# Patient Record
Sex: MALE | Race: WHITE | NOT HISPANIC OR LATINO | URBAN - METROPOLITAN AREA
[De-identification: names, ages, dates, MRNs, and addresses within clinical notes are randomized per-mention and may not be internally consistent; named-entity substitution may affect disease eponyms.]

---

## 2017-01-25 PROBLEM — D50.9 IRON DEFICIENCY ANEMIA: Status: ACTIVE | Noted: 2017-01-25

## 2017-01-25 PROBLEM — G63 POLYNEUROPATHY ASSOCIATED WITH UNDERLYING DISEASE (HCC): Chronic | Status: ACTIVE | Noted: 2017-01-25

## 2017-04-18 ENCOUNTER — APPOINTMENT (RX ONLY)
Dept: URBAN - METROPOLITAN AREA CLINIC 349 | Facility: CLINIC | Age: 67
Setting detail: DERMATOLOGY
End: 2017-04-18

## 2017-04-18 DIAGNOSIS — L72.8 OTHER FOLLICULAR CYSTS OF THE SKIN AND SUBCUTANEOUS TISSUE: ICD-10-CM

## 2017-04-18 PROBLEM — C44.629 SQUAMOUS CELL CARCINOMA OF SKIN OF LEFT UPPER LIMB, INCLUDING SHOULDER: Status: ACTIVE | Noted: 2017-04-18

## 2017-04-18 PROCEDURE — 88305 TISSUE EXAM BY PATHOLOGIST: CPT

## 2017-04-18 PROCEDURE — 17262 DSTRJ MAL LES T/A/L 1.1-2.0: CPT

## 2017-04-18 PROCEDURE — ? OTHER

## 2017-04-18 PROCEDURE — A4550 SURGICAL TRAYS: HCPCS

## 2017-04-18 PROCEDURE — ? SHAVE REMOVAL AND DESTRUCTION

## 2017-04-18 PROCEDURE — ? PATHOLOGY BILLING

## 2017-04-18 PROCEDURE — ? COUNSELING

## 2017-04-18 PROCEDURE — 99202 OFFICE O/P NEW SF 15 MIN: CPT | Mod: 25

## 2017-04-18 PROCEDURE — ? DEFER

## 2017-04-18 ASSESSMENT — LOCATION DETAILED DESCRIPTION DERM
LOCATION DETAILED: LEFT MEDIAL UPPER BACK
LOCATION DETAILED: LEFT MEDIAL UPPER BACK

## 2017-04-18 ASSESSMENT — LOCATION SIMPLE DESCRIPTION DERM
LOCATION SIMPLE: LEFT UPPER BACK
LOCATION SIMPLE: LEFT UPPER BACK

## 2017-04-18 ASSESSMENT — LOCATION ZONE DERM
LOCATION ZONE: TRUNK
LOCATION ZONE: TRUNK

## 2017-04-18 NOTE — PROCEDURE: SHAVE REMOVAL AND DESTRUCTION
Post-Care Instructions: I reviewed with the patient in detail post-care instructions. Patient is to keep the biopsy site dry overnight, and then apply bacitracin twice daily until healed. Patient may apply hydrogen peroxide soaks to remove any crusting.  After the procedure, the patient was observed for 5-10 minutes and was oriented to,person, place and time and denied feeling dizzy, queasy and stated that they were not going to faint
Bill For Surgical Tray: yes
Accession #: [email or web address]
Anesthesia Volume In Cc: 1
Anesthesia Type: 2% lidocaine with epinephrine
Size Of Lesion In Cm: 1.1
Bill As?: Note: Bill Malignant Destruction If Path Confirms Malignant Lesion. Only Bill As Shave Removal If Path Comes Back Benign. Do Not Bill Shave Removal On Malignant Lesions.: Malignant Destruction
Dressing: dry sterile dressing
Bill 58583 For Specimen Handling/Conveyance To Laboratory?: no
Consent: Written consent was obtained and risks were reviewed including but not limited to scarring, infection, bleeding, scabbing, incomplete removal, nerve damage and allergy to anesthesia.
Wound Care: Vaseline
Detail Level: Detailed
Notification Instructions: Patient will be notified of biopsy results. However, patient instructed to call the office if not contacted within 2 weeks.
Billing Type: Third-Party Bill
Hemostasis: Drysol
Cautery Type: electrodesiccation

## 2017-04-18 NOTE — PROCEDURE: OTHER
Other (Free Text): After the procedure, the patient was observed 5-10 minutes and was oriented to person, place and time and denied feeling dizzy, queasy and stated that they  did not feel that they were going to faint
Note Text (......Xxx Chief Complaint.): This diagnosis correlates with the
Detail Level: Detailed

## 2017-07-26 ENCOUNTER — APPOINTMENT (RX ONLY)
Dept: URBAN - METROPOLITAN AREA CLINIC 349 | Facility: CLINIC | Age: 67
Setting detail: DERMATOLOGY
End: 2017-07-26

## 2017-07-26 DIAGNOSIS — L57.0 ACTINIC KERATOSIS: ICD-10-CM

## 2017-07-26 DIAGNOSIS — D22 MELANOCYTIC NEVI: ICD-10-CM

## 2017-07-26 PROBLEM — D22.5 MELANOCYTIC NEVI OF TRUNK: Status: ACTIVE | Noted: 2017-07-26

## 2017-07-26 PROBLEM — C44.629 SQUAMOUS CELL CARCINOMA OF SKIN OF LEFT UPPER LIMB, INCLUDING SHOULDER: Status: ACTIVE | Noted: 2017-07-26

## 2017-07-26 PROCEDURE — ? OTHER

## 2017-07-26 PROCEDURE — ? COUNSELING

## 2017-07-26 PROCEDURE — ? SHAVE REMOVAL AND DESTRUCTION

## 2017-07-26 PROCEDURE — 17000 DESTRUCT PREMALG LESION: CPT

## 2017-07-26 PROCEDURE — ? LIQUID NITROGEN

## 2017-07-26 PROCEDURE — A4550 SURGICAL TRAYS: HCPCS

## 2017-07-26 PROCEDURE — 17262 DSTRJ MAL LES T/A/L 1.1-2.0: CPT | Mod: 59

## 2017-07-26 PROCEDURE — ? PATHOLOGY BILLING

## 2017-07-26 PROCEDURE — 88305 TISSUE EXAM BY PATHOLOGIST: CPT

## 2017-07-26 ASSESSMENT — LOCATION SIMPLE DESCRIPTION DERM
LOCATION SIMPLE: LEFT FOREARM
LOCATION SIMPLE: RIGHT UPPER BACK

## 2017-07-26 ASSESSMENT — LOCATION DETAILED DESCRIPTION DERM
LOCATION DETAILED: LEFT DISTAL DORSAL FOREARM
LOCATION DETAILED: RIGHT SUPERIOR MEDIAL UPPER BACK

## 2017-07-26 ASSESSMENT — LOCATION ZONE DERM
LOCATION ZONE: ARM
LOCATION ZONE: TRUNK

## 2017-07-26 NOTE — PROCEDURE: SHAVE REMOVAL AND DESTRUCTION
Bill 46927 For Specimen Handling/Conveyance To Laboratory?: no
Number Of Curettages: 1
Post-Care Instructions: I reviewed with the patient in detail post-care instructions. Patient is to keep the biopsy site dry overnight, and then apply bacitracin twice daily until healed. Patient may apply hydrogen peroxide soaks to remove any crusting.  After the procedure, the patient was observed for 5-10 minutes and was oriented to,person, place and time and denied feeling dizzy, queasy and stated that they were not going to faint
Size Of Lesion In Cm: 1.1
Anesthesia Type: 2% lidocaine with epinephrine
Bill As?: Note: Bill Malignant Destruction If Path Confirms Malignant Lesion. Only Bill As Shave Removal If Path Comes Back Benign. Do Not Bill Shave Removal On Malignant Lesions.: Malignant Destruction
Billing Type: Third-Party Bill
Hemostasis: Drysol
Cautery Type: electrodesiccation
Wound Care: Vaseline
Accession #: [email or web address]
Notification Instructions: Patient will be notified of biopsy results. However, patient instructed to call the office if not contacted within 2 weeks.
Render Post-Care Instructions In Note?: yes
Detail Level: Detailed
Consent: Written consent was obtained and risks were reviewed including but not limited to scarring, infection, bleeding, scabbing, incomplete removal, nerve damage and allergy to anesthesia.
Dressing: dry sterile dressing

## 2017-07-26 NOTE — PROCEDURE: LIQUID NITROGEN
Number Of Freeze-Thaw Cycles: 2 freeze-thaw cycles
Consent: The patient's consent was obtained including but not limited to risks of crusting, scabbing, blistering, scarring, darker or lighter pigmentary change, recurrence, incomplete removal and infection.
Post-Care Instructions: I reviewed with the patient in detail post-care instructions. Patient is to wear sunprotection, and avoid picking at any of the treated lesions. Pt may apply Vaseline to crusted or scabbing areas.
Duration Of Freeze Thaw-Cycle (Seconds): 3
Render Post-Care Instructions In Note?: no
Detail Level: Zone

## 2017-07-26 NOTE — PROCEDURE: OTHER
Note Text (......Xxx Chief Complaint.): This diagnosis correlates with the
Detail Level: Detailed
Other (Free Text): After the procedure, the patient was observed 5-10 minutes and was oriented to person, place and time and denied feeling dizzy, queasy and stated that they  did not feel that they were going to faint

## 2017-12-04 ENCOUNTER — HOSPITAL ENCOUNTER (EMERGENCY)
Age: 67
Discharge: HOME | End: 2017-12-05
Attending: EMERGENCY MEDICINE
Payer: MEDICARE

## 2017-12-04 ENCOUNTER — APPOINTMENT (OUTPATIENT)
Dept: GENERAL RADIOLOGY | Age: 67
End: 2017-12-04
Attending: EMERGENCY MEDICINE
Payer: MEDICARE

## 2017-12-04 DIAGNOSIS — R07.9 ACUTE CHEST PAIN: ICD-10-CM

## 2017-12-04 DIAGNOSIS — J06.9 ACUTE URI: Primary | ICD-10-CM

## 2017-12-04 LAB
ANION GAP SERPL CALC-SCNC: 12 MMOL/L (ref 7–16)
BASOPHILS # BLD: 0 K/UL (ref 0–0.2)
BASOPHILS NFR BLD: 0 % (ref 0–2)
BUN SERPL-MCNC: 27 MG/DL (ref 8–23)
CALCIUM SERPL-MCNC: 8.9 MG/DL (ref 8.3–10.4)
CHLORIDE SERPL-SCNC: 104 MMOL/L (ref 98–107)
CO2 SERPL-SCNC: 22 MMOL/L (ref 21–32)
CREAT SERPL-MCNC: 1.65 MG/DL (ref 0.8–1.5)
DIFFERENTIAL METHOD BLD: ABNORMAL
EOSINOPHIL # BLD: 0.4 K/UL (ref 0–0.8)
EOSINOPHIL NFR BLD: 3 % (ref 0.5–7.8)
ERYTHROCYTE [DISTWIDTH] IN BLOOD BY AUTOMATED COUNT: 15.7 % (ref 11.9–14.6)
GLUCOSE SERPL-MCNC: 181 MG/DL (ref 65–100)
HCT VFR BLD AUTO: 42.6 % (ref 41.1–50.3)
HGB BLD-MCNC: 14 G/DL (ref 13.6–17.2)
IMM GRANULOCYTES # BLD: 0 K/UL (ref 0–0.5)
IMM GRANULOCYTES NFR BLD AUTO: 0 % (ref 0–5)
LACTATE BLD-SCNC: 1.9 MMOL/L (ref 0.5–1.9)
LYMPHOCYTES # BLD: 2 K/UL (ref 0.5–4.6)
LYMPHOCYTES NFR BLD: 16 % (ref 13–44)
MCH RBC QN AUTO: 28.9 PG (ref 26.1–32.9)
MCHC RBC AUTO-ENTMCNC: 32.9 G/DL (ref 31.4–35)
MCV RBC AUTO: 88 FL (ref 79.6–97.8)
MONOCYTES # BLD: 1.1 K/UL (ref 0.1–1.3)
MONOCYTES NFR BLD: 9 % (ref 4–12)
NEUTS SEG # BLD: 8.9 K/UL (ref 1.7–8.2)
NEUTS SEG NFR BLD: 72 % (ref 43–78)
PLATELET # BLD AUTO: 109 K/UL (ref 150–450)
PLATELET COMMENTS,PCOM: SLIGHT
PMV BLD AUTO: 11.8 FL (ref 10.8–14.1)
POTASSIUM SERPL-SCNC: 5.5 MMOL/L (ref 3.5–5.1)
RBC # BLD AUTO: 4.84 M/UL (ref 4.23–5.67)
SODIUM SERPL-SCNC: 138 MMOL/L (ref 136–145)
TROPONIN I BLD-MCNC: 0 NG/ML (ref 0.02–0.05)
WBC # BLD AUTO: 12.4 K/UL (ref 4.3–11.1)
WBC MORPH BLD: ABNORMAL

## 2017-12-04 PROCEDURE — 80048 BASIC METABOLIC PNL TOTAL CA: CPT | Performed by: EMERGENCY MEDICINE

## 2017-12-04 PROCEDURE — 84484 ASSAY OF TROPONIN QUANT: CPT

## 2017-12-04 PROCEDURE — 71020 XR CHEST PA LAT: CPT

## 2017-12-04 PROCEDURE — 85025 COMPLETE CBC W/AUTO DIFF WBC: CPT

## 2017-12-04 PROCEDURE — 93005 ELECTROCARDIOGRAM TRACING: CPT | Performed by: EMERGENCY MEDICINE

## 2017-12-04 PROCEDURE — 99285 EMERGENCY DEPT VISIT HI MDM: CPT | Performed by: EMERGENCY MEDICINE

## 2017-12-04 PROCEDURE — 83605 ASSAY OF LACTIC ACID: CPT

## 2017-12-05 VITALS
HEIGHT: 75 IN | WEIGHT: 237 LBS | OXYGEN SATURATION: 98 % | RESPIRATION RATE: 18 BRPM | DIASTOLIC BLOOD PRESSURE: 50 MMHG | HEART RATE: 85 BPM | SYSTOLIC BLOOD PRESSURE: 99 MMHG | TEMPERATURE: 100 F | BODY MASS INDEX: 29.47 KG/M2

## 2017-12-05 LAB
ATRIAL RATE: 82 BPM
CALCULATED P AXIS, ECG09: 62 DEGREES
CALCULATED R AXIS, ECG10: 104 DEGREES
CALCULATED T AXIS, ECG11: 100 DEGREES
DIAGNOSIS, 93000: NORMAL
P-R INTERVAL, ECG05: 198 MS
Q-T INTERVAL, ECG07: 392 MS
QRS DURATION, ECG06: 90 MS
QTC CALCULATION (BEZET), ECG08: 457 MS
VENTRICULAR RATE, ECG03: 82 BPM

## 2017-12-05 RX ORDER — AZITHROMYCIN 250 MG/1
TABLET, FILM COATED ORAL
Qty: 6 TAB | Refills: 0 | Status: SHIPPED | OUTPATIENT
Start: 2017-12-05 | End: 2018-01-31 | Stop reason: ALTCHOICE

## 2017-12-05 RX ORDER — CODEINE PHOSPHATE AND GUAIFENESIN 10; 100 MG/5ML; MG/5ML
5-10 SOLUTION ORAL
Qty: 150 ML | Refills: 0 | Status: SHIPPED | OUTPATIENT
Start: 2017-12-05 | End: 2018-01-31

## 2017-12-05 NOTE — DISCHARGE INSTRUCTIONS
Upper Respiratory Infection (Cold): Care Instructions  Your Care Instructions    An upper respiratory infection, or URI, is an infection of the nose, sinuses, or throat. URIs are spread by coughs, sneezes, and direct contact. The common cold is the most frequent kind of URI. The flu and sinus infections are other kinds of URIs. Almost all URIs are caused by viruses. Antibiotics won't cure them. But you can treat most infections with home care. This may include drinking lots of fluids and taking over-the-counter pain medicine. You will probably feel better in 4 to 10 days. The doctor has checked you carefully, but problems can develop later. If you notice any problems or new symptoms, get medical treatment right away. Follow-up care is a key part of your treatment and safety. Be sure to make and go to all appointments, and call your doctor if you are having problems. It's also a good idea to know your test results and keep a list of the medicines you take. How can you care for yourself at home? · To prevent dehydration, drink plenty of fluids, enough so that your urine is light yellow or clear like water. Choose water and other caffeine-free clear liquids until you feel better. If you have kidney, heart, or liver disease and have to limit fluids, talk with your doctor before you increase the amount of fluids you drink. · Take an over-the-counter pain medicine, such as acetaminophen (Tylenol), ibuprofen (Advil, Motrin), or naproxen (Aleve). Read and follow all instructions on the label. · Before you use cough and cold medicines, check the label. These medicines may not be safe for young children or for people with certain health problems. · Be careful when taking over-the-counter cold or flu medicines and Tylenol at the same time. Many of these medicines have acetaminophen, which is Tylenol. Read the labels to make sure that you are not taking more than the recommended dose.  Too much acetaminophen (Tylenol) can be harmful. · Get plenty of rest.  · Do not smoke or allow others to smoke around you. If you need help quitting, talk to your doctor about stop-smoking programs and medicines. These can increase your chances of quitting for good. When should you call for help? Call 911 anytime you think you may need emergency care. For example, call if:  ? · You have severe trouble breathing. ?Call your doctor now or seek immediate medical care if:  ? · You seem to be getting much sicker. ? · You have new or worse trouble breathing. ? · You have a new or higher fever. ? · You have a new rash. ? Watch closely for changes in your health, and be sure to contact your doctor if:  ? · You have a new symptom, such as a sore throat, an earache, or sinus pain. ? · You cough more deeply or more often, especially if you notice more mucus or a change in the color of your mucus. ? · You do not get better as expected. Where can you learn more? Go to http://ran-genaro.info/. Enter R992 in the search box to learn more about \"Upper Respiratory Infection (Cold): Care Instructions. \"  Current as of: May 12, 2017  Content Version: 11.4  © 4488-0858 [employer]. Care instructions adapted under license by "[employer] (which disclaims liability or warranty for this information). If you have questions about a medical condition or this instruction, always ask your healthcare professional. [place] [address] any warranty or liability for your use of this information. Musculoskeletal Chest Pain: Care Instructions  Your Care Instructions    Chest pain is not always a sign that something is wrong with your heart or that you have another serious problem. The doctor thinks your chest pain is caused by strained muscles or ligaments, inflamed chest cartilage, or another problem in your chest, rather than by your heart.  You may need more tests to find the cause of your chest pain. Follow-up care is a key part of your treatment and safety. Be sure to make and go to all appointments, and call your doctor if you are having problems. It's also a good idea to know your test results and keep a list of the medicines you take. How can you care for yourself at home? · Take pain medicines exactly as directed. ¨ If the doctor gave you a prescription medicine for pain, take it as prescribed. ¨ If you are not taking a prescription pain medicine, ask your doctor if you can take an over-the-counter medicine. · Rest and protect the sore area. · Stop, change, or take a break from any activity that may be causing your pain or soreness. · Put ice or a cold pack on the sore area for 10 to 20 minutes at a time. Try to do this every 1 to 2 hours for the next 3 days (when you are awake) or until the swelling goes down. Put a thin cloth between the ice and your skin. · After 2 or 3 days, apply a heating pad set on low or a warm cloth to the area that hurts. Some doctors suggest that you go back and forth between hot and cold. · Do not wrap or tape your ribs for support. This may cause you to take smaller breaths, which could increase your risk of lung problems. · Mentholated creams such as Bengay or Icy Hot may soothe sore muscles. Follow the instructions on the package. · Follow your doctor's instructions for exercising. · Gentle stretching and massage may help you get better faster. Stretch slowly to the point just before pain begins, and hold the stretch for at least 15 to 30 seconds. Do this 3 or 4 times a day. Stretch just after you have applied heat. · As your pain gets better, slowly return to your normal activities. Any increased pain may be a sign that you need to rest a while longer. When should you call for help? Call 911 anytime you think you may need emergency care. For example, call if:  ? · You have chest pain or pressure. This may occur with:  ¨ Sweating.   ¨ Shortness of breath. ¨ Nausea or vomiting. ¨ Pain that spreads from the chest to the neck, jaw, or one or both shoulders or arms. ¨ Dizziness or lightheadedness. ¨ A fast or uneven pulse. After calling 911, chew 1 adult-strength aspirin. Wait for an ambulance. Do not try to drive yourself. ? · You have sudden chest pain and shortness of breath, or you cough up blood. ?Call your doctor now or seek immediate medical care if:  ? · You have any trouble breathing. ? · Your chest pain gets worse. ? · Your chest pain occurs consistently with exercise and is relieved by rest.   ? Watch closely for changes in your health, and be sure to contact your doctor if:  ? · Your chest pain does not get better after 1 week. Where can you learn more? Go to http://ran-genaro.info/. Enter V293 in the search box to learn more about \"Musculoskeletal Chest Pain: Care Instructions. \"  Current as of: March 20, 2017  Content Version: 11.4  © 8535-1362 [employer]. Care instructions adapted under license by [employer] (which disclaims liability or warranty for this information). If you have questions about a medical condition or this instruction, always ask your healthcare professional. [place] [address] any warranty or liability for your use of this information.

## 2017-12-05 NOTE — ED TRIAGE NOTES
Pt states that he has been having a  Productive cough with yellow mucus  for 3 days and that tonight he started to have chest pain

## 2017-12-05 NOTE — ED PROVIDER NOTES
HPI Comments: Several people in the family with similar symptoms earlier in the week/last week. Patient is a 78 y.o. male presenting with cough. The history is provided by the patient and the spouse. Cough   This is a new problem. The current episode started more than 2 days ago. The problem occurs every few minutes. The problem has been gradually worsening. The cough is productive of sputum. Patient reports a subjective fever - was not measured. The fever has been present for 1 - 2 days. Associated symptoms include chest pain (with cough and deep inspiration), rhinorrhea and myalgias. Pertinent negatives include no chills, no sweats, no weight loss, no eye redness, no ear congestion, no ear pain, no headaches, no sore throat, no shortness of breath, no wheezing, no nausea, no vomiting and no confusion. He has tried nothing for the symptoms. The treatment provided no relief. He is a smoker. His past medical history is significant for bronchitis. His past medical history does not include pneumonia, asthma or CHF. Past Medical History:   Diagnosis Date    Aneurysm 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 Stent x 5 on 2008    Edema     Ischemic cardiomyopathy     [facility] (2007) EF 20%     [facility] (2008) EF 40%  echo (1/14/11): Mild LV dysfunction. EF 45%. Impaired relaxation. Mod. left atrial enlargement. Mild mitral/tricuspid/pulmonic regurgitation    Other diseases of lung, not elsewhere classified     CT of chest 8/2010:  5 mm right upper lobe pulmonary nodule. 1 mm nodule in left upper lobe. .  CT chest (5/7/12) Stable tiny right upper lobe pulmonary nodule suggesting benign etiology.          Past Surgical History:   Procedure Laterality Date    HX CORONARY STENT PLACEMENT      stents x4 (2007) then 5 stents (2008)    HX HEART CATHETERIZATION      HX LUMBAR FUSION  2009    fusion L4, L5  (Dr. Cardenas Leader)    VASCULAR SURGERY PROCEDURE UNLIST  10/2013    x3 stents aorta         Family History:   Problem Relation Age of Onset    Diabetes Father     Hypertension Father        Social History     Social History    Marital status:      Spouse name: N/A    Number of children: N/A    Years of education: N/A     Occupational History    Not on file. Social History Main Topics    Smoking status: Current Every Day Smoker     Packs/day: 1.00     Years: 45.00     Types: Cigarettes    Smokeless tobacco: Never Used    Alcohol use No    Drug use: No    Sexual activity: Not on file     Other Topics Concern    Not on file     Social History Narrative         ALLERGIES: Glipizide and Morphine    Review of Systems   Constitutional: Positive for activity change, appetite change, fatigue and fever. Negative for chills and weight loss. HENT: Positive for rhinorrhea. Negative for ear pain and sore throat. Eyes: Negative for redness. Respiratory: Positive for cough. Negative for shortness of breath and wheezing. Cardiovascular: Positive for chest pain (with cough and deep inspiration). Gastrointestinal: Negative for nausea and vomiting. Musculoskeletal: Positive for myalgias. Neurological: Negative for headaches. Psychiatric/Behavioral: Negative for confusion. All other systems reviewed and are negative. Vitals:    12/04/17 2225 12/04/17 2256 12/04/17 2324 12/04/17 2337   BP: 121/63 121/63 100/55    Pulse:       Resp:       Temp:       SpO2: 94% 95% (!) 88% 92%   Weight:       Height:                Physical Exam   Constitutional: He is oriented to person, place, and time. He appears well-developed and well-nourished. He appears distressed. HENT:   Head: Normocephalic and atraumatic.    Right Ear: Tympanic membrane and external ear normal.   Left Ear: Tympanic membrane and external ear normal.   Mouth/Throat: Oropharynx is clear and moist.   Eyes: Conjunctivae and EOM are normal. Pupils are equal, round, and reactive to light. Neck: Normal range of motion. Neck supple. No tracheal deviation present. Cardiovascular: Normal rate, regular rhythm, normal heart sounds and intact distal pulses. Exam reveals no gallop and no friction rub. No murmur heard. Pulmonary/Chest: Effort normal and breath sounds normal. No respiratory distress. He has no wheezes. He exhibits tenderness. Abdominal: Soft. Bowel sounds are normal. He exhibits no distension and no mass. There is no hepatosplenomegaly. There is no tenderness. There is no rebound and no guarding. Musculoskeletal: Normal range of motion. He exhibits no edema. Lymphadenopathy:     He has no cervical adenopathy. Neurological: He is alert and oriented to person, place, and time. He displays normal reflexes. No cranial nerve deficit. Skin: Skin is warm and dry. No rash noted. He is not diaphoretic. No erythema. Psychiatric: He has a normal mood and affect. Nursing note and vitals reviewed.        MDM  Number of Diagnoses or Management Options  Acute chest pain: new and requires workup  Acute URI: new and requires workup     Amount and/or Complexity of Data Reviewed  Clinical lab tests: ordered and reviewed  Tests in the radiology section of CPT®: ordered and reviewed  Decide to obtain previous medical records or to obtain history from someone other than the patient: yes  Review and summarize past medical records: yes  Independent visualization of images, tracings, or specimens: yes    Risk of Complications, Morbidity, and/or Mortality  Presenting problems: high  Diagnostic procedures: moderate  Management options: moderate    Patient Progress  Patient progress: stable    ED Course       Procedures    Results Reviewed:      Recent Results (from the past 24 hour(s))   EKG, 12 LEAD, INITIAL    Collection Time: 12/04/17  7:37 PM   Result Value Ref Range    Ventricular Rate 82 BPM    Atrial Rate 82 BPM    P-R Interval 198 ms    QRS Duration 90 ms    Q-T Interval 392 ms    QTC Calculation (Bezet) 457 ms    Calculated P Axis 62 degrees    Calculated R Axis 104 degrees    Calculated T Axis 100 degrees    Diagnosis       Sinus rhythm with frequent Premature ventricular complexes  Rightward axis  Possible Inferior infarct , age undetermined  Abnormal ECG  When compared with ECG of 06-FEB-2016 17:52,  Significant changes have occurred     METABOLIC PANEL, BASIC    Collection Time: 12/04/17  7:49 PM   Result Value Ref Range    Sodium 138 136 - 145 mmol/L    Potassium 5.5 (H) 3.5 - 5.1 mmol/L    Chloride 104 98 - 107 mmol/L    CO2 22 21 - 32 mmol/L    Anion gap 12 7 - 16 mmol/L    Glucose 181 (H) 65 - 100 mg/dL    BUN 27 (H) 8 - 23 MG/DL    Creatinine 1.65 (H) 0.8 - 1.5 MG/DL    GFR est AA 54 (L) >60 ml/min/1.73m2    GFR est non-AA 45 (L) >60 ml/min/1.73m2    Calcium 8.9 8.3 - 10.4 MG/DL   POC TROPONIN-I    Collection Time: 12/04/17  7:54 PM   Result Value Ref Range    Troponin-I (POC) 0 (L) 0.02 - 0.05 ng/ml   POC LACTIC ACID    Collection Time: 12/04/17  7:55 PM   Result Value Ref Range    Lactic Acid (POC) 1.9 0.5 - 1.9 mmol/L   CBC WITH AUTOMATED DIFF    Collection Time: 12/04/17 10:28 PM   Result Value Ref Range    WBC 12.4 (H) 4.3 - 11.1 K/uL    RBC 4.84 4.23 - 5.67 M/uL    HGB 14.0 13.6 - 17.2 g/dL    HCT 42.6 41.1 - 50.3 %    MCV 88.0 79.6 - 97.8 FL    MCH 28.9 26.1 - 32.9 PG    MCHC 32.9 31.4 - 35.0 g/dL    RDW 15.7 (H) 11.9 - 14.6 %    PLATELET 058 (L) 668 - 450 K/uL    MPV 11.8 10.8 - 14.1 FL    NEUTROPHILS 72 43 - 78 %    LYMPHOCYTES 16 13 - 44 %    MONOCYTES 9 4.0 - 12.0 %    EOSINOPHILS 3 0.5 - 7.8 %    BASOPHILS 0 0.0 - 2.0 %    IMMATURE GRANULOCYTES 0 0.0 - 5.0 %    ABS. NEUTROPHILS 8.9 (H) 1.7 - 8.2 K/UL    ABS. LYMPHOCYTES 2.0 0.5 - 4.6 K/UL    ABS. MONOCYTES 1.1 0.1 - 1.3 K/UL    ABS. EOSINOPHILS 0.4 0.0 - 0.8 K/UL    ABS. BASOPHILS 0.0 0.0 - 0.2 K/UL    ABS. IMM.  GRANS. 0.0 0.0 - 0.5 K/UL    WBC COMMENTS OCCASIONAL      PLATELET COMMENTS SLIGHT      DF AUTOMATED       XR CHEST PA LAT   Final Result   Impression: No active disease in the chest.                   I discussed the results of all labs, procedures, radiographs, and treatments with the patient and available family. Treatment plan is agreed upon and the patient is ready for discharge. All voiced understanding of the discharge plan and medication instructions or changes as appropriate. Questions about treatment in the ED were answered. All were encouraged to return should symptoms worsen or new problems develop.

## 2017-12-05 NOTE — ED NOTES
Pt alert, stable, VS stable. INT d/c'd intact. Pt sleepy, wakes easily. Med education given on Rx x 2 to pt and family. Hm care and d/c instructions given to pt and family. Both verbalize understanding. Pt slowly ambulatory with steady gait with walking cane, with wife for d/c. No resp difficulty noted.

## 2018-04-30 PROBLEM — E11.21 TYPE 2 DIABETES WITH NEPHROPATHY (HCC): Status: ACTIVE | Noted: 2018-04-30

## 2018-04-30 PROBLEM — E11.40 TYPE 2 DIABETES MELLITUS WITH DIABETIC NEUROPATHY (HCC): Status: ACTIVE | Noted: 2018-04-30

## 2018-05-01 PROBLEM — G62.9 SENSORY MOTOR NEUROPATHY: Status: ACTIVE | Noted: 2018-05-01

## 2018-05-15 ENCOUNTER — APPOINTMENT (OUTPATIENT)
Dept: PHYSICAL THERAPY | Age: 68
End: 2018-05-15
Attending: FAMILY MEDICINE

## 2019-06-18 ENCOUNTER — HOSPITAL ENCOUNTER (OUTPATIENT)
Dept: GENERAL RADIOLOGY | Age: 69
Discharge: HOME | End: 2019-06-18
Payer: MEDICARE

## 2019-06-18 ENCOUNTER — HOSPITAL ENCOUNTER (OUTPATIENT)
Dept: WOUND CARE | Age: 69
Discharge: HOME | End: 2019-06-18
Attending: SURGERY
Payer: MEDICARE

## 2019-06-18 VITALS
BODY MASS INDEX: 27.55 KG/M2 | WEIGHT: 221.6 LBS | RESPIRATION RATE: 18 BRPM | DIASTOLIC BLOOD PRESSURE: 79 MMHG | SYSTOLIC BLOOD PRESSURE: 127 MMHG | HEIGHT: 75 IN | TEMPERATURE: 98.4 F | HEART RATE: 63 BPM

## 2019-06-18 DIAGNOSIS — L97.512 SKIN ULCER OF TOE OF RIGHT FOOT WITH FAT LAYER EXPOSED (HCC): Primary | ICD-10-CM

## 2019-06-18 DIAGNOSIS — M86.9 OSTEOMYELITIS OF RIGHT FOOT, UNSPECIFIED TYPE (HCC): ICD-10-CM

## 2019-06-18 DIAGNOSIS — L97.512 SKIN ULCER OF TOE OF RIGHT FOOT WITH FAT LAYER EXPOSED (HCC): ICD-10-CM

## 2019-06-18 DIAGNOSIS — I73.9 PVD (PERIPHERAL VASCULAR DISEASE) (HCC): Chronic | ICD-10-CM

## 2019-06-18 PROCEDURE — 73630 X-RAY EXAM OF FOOT: CPT

## 2019-06-18 PROCEDURE — 87205 SMEAR GRAM STAIN: CPT

## 2019-06-18 PROCEDURE — 87077 CULTURE AEROBIC IDENTIFY: CPT

## 2019-06-18 PROCEDURE — 87186 SC STD MICRODIL/AGAR DIL: CPT

## 2019-06-18 PROCEDURE — 99215 OFFICE O/P EST HI 40 MIN: CPT

## 2019-06-18 NOTE — PROGRESS NOTES
Wound Center Progress Note    Patient: Renetta Cedeño MRN: [id number]  SSN: [id number]    YOB: 1950  Age: 80 y.o. Sex: male      Subjective:     Chief Complaint:  Renetta Cedeño is a 80 y.o. WHITE OR  male who presents with Rt. foot right, hallux, toes great toe wound of 1 months duration. Patient states that he developed a ulcer of the right great  toe without knowing about it and saw his doctor Dr. Anna Murillo about a month ago who referred him to the wound center. Apparently he has been followed by his primary care for diabetes and peripheral vascular disease. His A1c is 9sWound Caused By: none and chronic pressure/irritation due to diabetes mellitus  Associated Signs and Symptoms: some soreness  Timing: Intermittent  Quality: Dull  Severity: 4/10  Modifying Factors: poorly controlled diabetes  Current Wound Care: see nurse's notes    Past Medical History:   Diagnosis Date    Aneurysm ([address]     AAA  and right comomon iliac artery aneurysm    Benign hypertensive heart disease without CHF 6/22/2015    CAD (coronary artery disease)     Inferior MI (9/2007) and (10/27/08)  Vision BMS x 4 on 2007. Stent x 5 on 2008    Edema     Ischemic cardiomyopathy     [facility] (2007) EF 20%     [facility] (2008) EF 40%  echo (1/14/11): Mild LV dysfunction. EF 45%. Impaired relaxation. Mod. left atrial enlargement. Mild mitral/tricuspid/pulmonic regurgitation    Other diseases of lung, not elsewhere classified     CT of chest 8/2010:  5 mm right upper lobe pulmonary nodule. 1 mm nodule in left upper lobe. .  CT chest (5/7/12) Stable tiny right upper lobe pulmonary nodule suggesting benign etiology.         Past Surgical History:   Procedure Laterality Date    HX CORONARY STENT PLACEMENT      stents x4 (2007) then 5 stents (2008)    HX HEART CATHETERIZATION      HX LUMBAR FUSION  2009    fusion L4, L5  (Dr. Olivia Flowers)    VASCULAR SURGERY PROCEDURE UNLIST  10/2013    x3 stents aorta     Family History   Problem Relation Age of Onset    Diabetes Father     Hypertension Father       Social History     Tobacco Use    Smoking status: Current Every Day Smoker     Packs/day: 1.00     Years: 45.00     Pack years: 45.00     Types: Cigarettes    Smokeless tobacco: Never Used   Substance Use Topics    Alcohol use: No     Alcohol/week: 0.0 oz       Prior to Admission medications    Medication Sig Start Date End Date Taking? Authorizing Provider   clopidogrel (PLAVIX) 75 mg tab Take 1 Tab by mouth daily. 6/11/19   Colonel Cristal Tierney MD   lisinopril-hydroCHLOROthiazide (PRINZIDE, ZESTORETIC) 20-25 mg per tablet Take 1 Tab by mouth daily. 6/11/19   Colonel Cristal Tierney MD   metoprolol succinate (TOPROL-XL) 50 mg XL tablet Take 1 Tab by mouth daily. 6/11/19   Colonel Cristal Tierney MD   pravastatin (PRAVACHOL) 40 mg tablet Take 1 Tab by mouth nightly. 6/11/19   Colonel Cristal Tierney MD   doxycycline (MONODOX) 100 mg capsule Take 1 Cap by mouth two (2) times a day. 6/6/19   Uyen Purcell MD   [facility] medical supply misc Glucose test meter, icd-10 E 11.8, check up to 3 times daily 6/6/19   Uyen Purcell MD   [facility] medical supply misc Glucose test strips - icd-10 E11.8  - test up to 3 times daily 6/6/19   Uyen Purcell MD   [facility] medical supply misc Lancets - use for up to 3 times daily glucose testing, icd-10 E 11.8 6/6/19   Uyen Purcell MD   nitroglycerin (NITROSTAT) 0.4 mg SL tablet 1 Tab by SubLINGual route every five (5) minutes as needed for Chest Pain. 5/16/19   Colonel Cristal Tierney MD   metFORMIN (GLUCOPHAGE) 1,000 mg tablet Take 1 Tab by mouth two (2) times daily (with meals). 1/14/19   Uyen Purcell MD   gabapentin (NEURONTIN) 600 mg tablet 2 tablets po three times daily 1/14/19   Uyen Purcell MD   oxyCODONE IR (OXY-IR) 30 mg immediate release tablet Take 1 Tab by mouth four (4) times daily.  Max Daily Amount: 120 mg. 4/30/15   Uyen Purcell MD     Allergies   Allergen Reactions  Bactrim [Sulfamethoprim] Angioedema     Severe reaction, affected kidney function    Glipizide Rash    Morphine Nausea and Vomiting        Review of Systems:  A comprehensive review of systems was negative except for that written in the History of Present Illness. Lab Results   Component Value Date/Time    Hemoglobin A1c 9.2 (H) 04/29/2019 10:34 AM        Immunization History   Administered Date(s) Administered    TB Skin Test (PPD) Intradermal 02/15/2016       There is no height or weight on file to calculate BMI. Counseling regarding nutrition done: No     Current medications:  Current Outpatient Medications   Medication Sig Dispense Refill    clopidogrel (PLAVIX) 75 mg tab Take 1 Tab by mouth daily. 90 Tab 3    lisinopril-hydroCHLOROthiazide (PRINZIDE, ZESTORETIC) 20-25 mg per tablet Take 1 Tab by mouth daily. 90 Tab 3    metoprolol succinate (TOPROL-XL) 50 mg XL tablet Take 1 Tab by mouth daily. 90 Tab 3    pravastatin (PRAVACHOL) 40 mg tablet Take 1 Tab by mouth nightly. 90 Tab 3    doxycycline (MONODOX) 100 mg capsule Take 1 Cap by mouth two (2) times a day. 28 Cap 1    miscellaneous medical supply misc Glucose test meter, icd-10 E 11.8, check up to 3 times daily 1 Each 0    miscellaneous medical supply misc Glucose test strips - icd-10 E11.8  - test up to 3 times daily 100 Each 11    miscellaneous medical supply misc Lancets - use for up to 3 times daily glucose testing, icd-10 E 11.8 100 Each 11    nitroglycerin (NITROSTAT) 0.4 mg SL tablet 1 Tab by SubLINGual route every five (5) minutes as needed for Chest Pain. 1 Bottle 6    metFORMIN (GLUCOPHAGE) 1,000 mg tablet Take 1 Tab by mouth two (2) times daily (with meals). 180 Tab 3    gabapentin (NEURONTIN) 600 mg tablet 2 tablets po three times daily 540 Tab 5    oxyCODONE IR (OXY-IR) 30 mg immediate release tablet Take 1 Tab by mouth four (4) times daily.  Max Daily Amount: 120 mg. 120 Tab 0         Objective:     Physical Exam: Visit Vitals  /79 (BP 1 Location: Left arm)   Pulse 63   Temp 98.4 °F (36.9 °C)   Resp 18       General: well developed, well nourished, pleasant , NAD. Hygiene good  Psych: cooperative. Pleasant. No anxiety or depression. Normal mood and affect. Neuro: alert and oriented to person/place/situation. Otherwise nonfocal.  Derm: Normal turgor for age, dry skin  HEENT: Normocephalic, atraumatic. EOMI. Conjunctiva clear. No scleral icterus. Neck: Normal range of motion. No masses. Chest: Good air entry bilaterally. Respirations nonlabored  Cardio: Normal heart sounds,no rubs, murmurs or gallops  Abdomen: Soft, nontender, nondistended, normoactive bowel sounds  Lower extremities: color normal; temperature normal. Hair growth is not present. Calves are supple, nontender, approximately equally sized in comparison. Capillary refill <3 sec            Ulcer Description:   Wound Toe (Comment  which one) Posterior Great toe (Active)   Wound Length (cm) 1.6 cm 6/18/2019  2:45 PM   Wound Width (cm) 1 cm 6/18/2019  2:45 PM   Wound Depth (cm) 0.1 cm 6/18/2019  2:45 PM   Wound Volume (cm^3) 0.16 cm^3 6/18/2019  2:45 PM   Condition of Base Granulation;Slough 6/18/2019  2:45 PM   Tissue Type Percent Red 95 6/18/2019  2:45 PM   Tissue Type Percent Yellow 5 6/18/2019  2:45 PM   Drainage Amount Small 6/18/2019  2:45 PM   Drainage Color Serosanguinous 6/18/2019  2:45 PM   Wound Odor None 6/18/2019  2:45 PM   Sherlyn-wound Assessment Other (Comment) 6/18/2019  2:45 PM   Cleansing and Cleansing Agents  Normal saline 6/18/2019  2:45 PM   Number of days: 0         Data Review:   No results found for this or any previous visit (from the past 24 hour(s)). Assessment:     80 y.o. male with Rt. foot hallux diabetic ulcer.     Problem List  Date Reviewed: 6/6/2019          Codes Class Noted    Skin ulcer of toe of right foot with fat layer exposed ([facility] ICD-10-CM: Y76.762  ICD-9-CM: 707.15  6/18/2019        Sensory motor neuropathy ICD-10-CM: G62.9  ICD-9-CM: 356.9  5/1/2018        Type 2 diabetes with nephropathy ([place] [address] ICD-10-CM: E11.21  ICD-9-CM: 250.40, 583.81  4/30/2018        Type 2 diabetes mellitus with diabetic neuropathy ([place] [address] ICD-10-CM: E11.40  ICD-9-CM: 250.60, 357.2  4/30/2018        Polyneuropathy associated with underlying disease ([facility] (Chronic) ICD-10-CM: G63  ICD-9-CM: 357.4  1/25/2017        Iron deficiency anemia ICD-10-CM: D50.9  ICD-9-CM: 280.9  1/25/2017        Mixed hyperlipidemia ICD-10-CM: E78.2  ICD-9-CM: 272.2  9/9/2016        Psoriasis ICD-10-CM: L40.9  ICD-9-CM: 696.1  9/9/2016        Edema ICD-10-CM: R60.9  ICD-9-CM: 782.3  9/8/2016        Ischemic cardiomyopathy ICD-10-CM: I25.5  ICD-9-CM: 414.8  8/26/2016    Overview Addendum 6/12/2019 10:12 PM by Matt Ley MD     1. LHC (9/2007) :  EF 20%  2. LHC (10/27/08):  40%  3. Echo (1/14/11): Mild LV dysfunction. EF 45%. Impaired relaxation. Moderate left atrial enlargement. Mild mitral/tricuspid/pulmonic regurgitation. 4.   Echo (2/16):  EF 55-60%. 5.  Echo (6/11/19):  EF 55-60%. Inferior HK. Mild LAE.  AVSC. Anemia, unspecified  ICD-10-CM: D64.9  ICD-9-CM: 285.9  8/26/2016    Overview Signed 8/26/2016  1:56 PM by Ciera Whyte     1. Admitted April 2015 with anemia. Received 2 units. Iron deficiency.              COPD (chronic obstructive pulmonary disease) (HCC) (Chronic) ICD-10-CM: J44.9  ICD-9-CM: 496  2/16/2016        Diabetes mellitus type 2, controlled ([address] (Chronic) ICD-10-CM: E11.9  ICD-9-CM: 250.00  2/10/2016        MSSA (methicillin susceptible Staphylococcus aureus) septicemia ([facility] ICD-10-CM: A41.01  ICD-9-CM: 038.11  2/7/2016        Chronic pain (Chronic) ICD-10-CM: B06.19  ICD-9-CM: 338.29  2/5/2016        Actinic keratosis (Chronic) ICD-10-CM: L57.0  ICD-9-CM: 702.0  10/19/2015        Peripheral neuropathy (Chronic) ICD-10-CM: G62.9  ICD-9-CM: 356.9  6/25/2015        Essential hypertension with goal blood pressure less than 140/90 ICD-10-CM: I10  ICD-9-CM: 401.9  6/22/2015        Tobacco abuse (Chronic) ICD-10-CM: Z72.0  ICD-9-CM: 305.1  4/21/2015        CAD (coronary artery disease) (Chronic) ICD-10-CM: I25.10  ICD-9-CM: 414.00  4/20/2015    Overview Addendum 9/8/2016  8:43 PM by Kan Vora MD     1. Inferior MI (9/2007):  PCI :  Overlapping 2.5 X 23, 3 X 18, 3 X 28 and 4 X 23 Vision BMS  2. Inferior MI  (10/27/08)  a. Cath: EF is 40% with inferior akinesis. Mild disease in left system. RCA: 50% diffuse in-stent restenosis. Acutely occluded at  the distal portion. b. PCI:  3.5 X 13 mm Cypher at PDA then entire stented segment(including previous stents) dilated to 4.25. PVD (peripheral vascular disease) (HCC) (Chronic) ICD-10-CM: I73.9  ICD-9-CM: 443.9  4/20/2015        Popliteal artery aneurysm, bilateral (HCC) (Chronic) ICD-10-CM: I72.4  ICD-9-CM: 442.3  6/23/2014        Abdominal aortic aneurysm [facility]) ICD-10-CM: I71.4  ICD-9-CM: 441.4  10/15/2013    Overview Addendum 9/8/2016  8:44 PM by MD Dr. Dimple Astorga. 1.    Endovascular repair of AAA and right common iliac artery aneurysm (10/14/13)             Current smoker (Chronic) ICD-10-CM: F17.200  ICD-9-CM: 305.1  10/15/2013               Needs:  Good local wound care  Edema management  Nutrition optimization  Good Diabetic control    Plan:     Orders Placed This Encounter    XR FOOT RT MIN 3 V     Standing Status:   Future     Standing Expiration Date:   7/17/2020     Order Specific Question:   Reason for Exam     Answer:   ghreat toe ulcer    REFERRAL TO VASCULAR CENTER     Referral Priority:   Routine     Referral Type:   Consultation     Referral Reason:   Specialty Services Required     Number of Visits Requested:   1    WOUND CARE, DRESSING CHANGE     Cleanse wound with normal saline. Acticoat Flex 3: cut top approximate size of wound, apply to wound bed. Cover with coversite. Wound culture taken today.   ABIs ordered today. Patient to get Xray as ordered. Information give to patient on purchasing diabetic wedge shoe from [address]. Standing Status:   Standing     Number of Occurrences:   1        Patient understood and agrees with plan. Questions answered. Follow-up Information     Follow up With Specialties Details Why Contact Info    [address] In 1 week  [address] [facility] [address] [phone]         PATIENT delayed to having his x-ray  But now we have found out that it does show osteomyelitis. Any procedures done today in the [address] [facility] are documented in a separate note in [address] [place] [address] and made part of this record by reference.      Dictated using voice recognition software; proofread, but unrecognized errors may exist.    Signed By: Kim Baldwin MD     June 18, 2019

## 2019-06-18 NOTE — WOUND CARE
06/18/19 1445 Wound Toe (Comment  which one) Posterior Great toe Date First Assessed/Time First Assessed: 06/18/19 1444   Primary Wound Type: Diabetic  Location: Toe (Comment  which one)  Wound Location Orientation: Posterior  Wound Description: Great toe Wound Length (cm) 1.6 cm Wound Width (cm) 1 cm Wound Depth (cm) 0.1 cm Wound Volume (cm^3) 0.16 cm^3 Condition of Base Granulation;Slough Tissue Type Percent Red 95 Tissue Type Percent Yellow 5 Drainage Amount Small Drainage Color Serosanguinous Wound Odor None Sherlyn-wound Assessment Other (Comment) (calloused) Cleansing and Cleansing Agents  Normal saline Patient is taking Plavix daily

## 2019-06-18 NOTE — WOUND CARE
[address] [place] [address], [facility] [address] Phone: [phone] Fax: [phone] Patient: Ana Rosa Maradiaga MRN: [id number]  SSN: [id number] YOB: 1950  Age: 80 y.o. Sex: male Return Appointment: 1 week with Ziggy Vaca MD 
 
Instructions: Cleanse wound with normal saline. Acticoat Flex 3: cut top approximate size of wound, apply to wound bed. Cover with coversite. Change three times as week or as needed if becomes soiled or loose. DO NOT GET WET. May purchase a cast cover from [employer] for showering or bathing. Wound culture taken today. ABIs ordered today. Patient to get Xray as ordered. Information give to patient on purchasing diabetic wedge shoe from [employer]. Should you experience increased redness, swelling, pain, foul odor, size of wound(s), or have a temperature over 101 degrees please contact the [address] [place] [address] Road at [phone] or if after hours contact your primary care physician or go to the hospital emergency department. Signed By: Jolynn Nair RN   
 June 18, 2019

## 2019-06-23 LAB
BACTERIA SPEC CULT: ABNORMAL
GRAM STN SPEC: ABNORMAL
GRAM STN SPEC: ABNORMAL
SERVICE CMNT-IMP: ABNORMAL

## 2019-06-24 ENCOUNTER — HOSPITAL ENCOUNTER (OUTPATIENT)
Dept: WOUND CARE | Age: 69
Discharge: HOME | End: 2019-06-24
Attending: SURGERY
Payer: MEDICARE

## 2019-06-24 VITALS
HEART RATE: 64 BPM | HEIGHT: 75 IN | BODY MASS INDEX: 27.45 KG/M2 | WEIGHT: 220.8 LBS | SYSTOLIC BLOOD PRESSURE: 119 MMHG | DIASTOLIC BLOOD PRESSURE: 69 MMHG | RESPIRATION RATE: 18 BRPM | TEMPERATURE: 98.1 F | OXYGEN SATURATION: 93 %

## 2019-06-24 DIAGNOSIS — L97.512 SKIN ULCER OF TOE OF RIGHT FOOT WITH FAT LAYER EXPOSED (HCC): ICD-10-CM

## 2019-06-24 DIAGNOSIS — G62.9 SENSORY MOTOR NEUROPATHY: ICD-10-CM

## 2019-06-24 DIAGNOSIS — I73.9 PVD (PERIPHERAL VASCULAR DISEASE) (HCC): Primary | Chronic | ICD-10-CM

## 2019-06-24 PROCEDURE — 99214 OFFICE O/P EST MOD 30 MIN: CPT

## 2019-06-24 RX ORDER — AMPICILLIN 500 MG/1
500 CAPSULE ORAL
Qty: 40 CAP | Refills: 0 | Status: SHIPPED | OUTPATIENT
Start: 2019-06-24 | End: 2019-07-04

## 2019-06-24 NOTE — DISCHARGE INSTRUCTIONS
Cleanse wound with normal saline. Apply Acticoat Flex 3: cut top approximate size of wound, apply to wound bed. Cover with coversite. Change three times as week or as needed if becomes soiled or loose. DO NOT GET WET. May purchase a cast cover from [employer] for showering or bathing. Information give to patient on purchasing diabetic wedge shoe from [employer]. Continue taking antibiotics. Infectious Disease Appt July 9th. TCOM ordered prior to Hyperbaric therapy.

## 2019-06-24 NOTE — WOUND CARE
06/24/19 1118 Wound Toe (Comment  which one) Posterior Great toe Date First Assessed/Time First Assessed: 06/18/19 1444   Primary Wound Type: Diabetic  Location: Toe (Comment  which one)  Wound Location Orientation: Posterior  Wound Description: Great toe Dressing Status Clean, dry, and intact Dressing Type  
(acticoat, covrsite) Wound Length (cm) 1.1 cm Wound Width (cm) 1 cm Wound Depth (cm) 0.3 cm Wound Volume (cm^3) 0.33 cm^3 Condition of Base Granulation Condition of Edges Calloused Assessment Red Tissue Type Percent Red 100 Undermining (cm) 0.3 cm Direction of Undermining 12 o'clock;6 o'clock Drainage Amount Small Drainage Color Serosanguinous Wound Odor None Cleansing and Cleansing Agents  Soap and water Dressing Changed Changed/New Patient is taking Plavix daily.

## 2019-06-24 NOTE — WOUND CARE
[address] [place] [address] [address] Phone: [phone] Fax: [phone] Patient: Christina Vera MRN: [id number]  SSN: [id number] YOB: 1950  Age: 80 y.o. Sex: male Return Appointment: 1 week with Vandana Lyon MD 
 
Instructions:  
Cleanse wound with normal saline. Apply Acticoat Flex 3: cut top approximate size of wound, apply to wound bed. Cover with coversite. Change three times as week or as needed if becomes soiled or loose. DO NOT GET WET. May purchase a cast cover from [employer] for showering or bathing. Information give to patient on purchasing diabetic wedge shoe from [employer]. Continue taking antibiotics. Infectious Disease Appt July 9th. TCOM ordered prior to Hyperbaric therapy. Should you experience increased redness, swelling, pain, foul odor, size of wound(s), or have a temperature over 101 degrees please contact the [address] [place] [address] Road at [phone] or if after hours contact your primary care physician or go to the hospital emergency department. Signed By: Rosita Mares RN   
 June 24, 2019

## 2019-07-01 ENCOUNTER — HOSPITAL ENCOUNTER (OUTPATIENT)
Dept: WOUND CARE | Age: 69
Discharge: HOME | End: 2019-07-01
Attending: SURGERY
Payer: MEDICARE

## 2019-07-01 VITALS
WEIGHT: 221.6 LBS | DIASTOLIC BLOOD PRESSURE: 75 MMHG | OXYGEN SATURATION: 99 % | SYSTOLIC BLOOD PRESSURE: 135 MMHG | HEART RATE: 57 BPM | TEMPERATURE: 97.6 F | HEIGHT: 75 IN | RESPIRATION RATE: 18 BRPM | BODY MASS INDEX: 27.55 KG/M2

## 2019-07-01 PROCEDURE — 99215 OFFICE O/P EST HI 40 MIN: CPT

## 2019-07-01 PROCEDURE — 93923 UPR/LXTR ART STDY 3+ LVLS: CPT

## 2019-07-01 NOTE — WOUND CARE
07/01/19 1136 Wound Toe (Comment  which one) Posterior Great toe Date First Assessed/Time First Assessed: 06/18/19 1444   Primary Wound Type: Diabetic  Location: Toe (Comment  which one)  Wound Location Orientation: Posterior  Wound Description: Great toe Dressing Status Clean, dry, and intact Dressing Type  
(acticoat, band-aid) Non-staged Wound Description Full thickness Wound Length (cm) 1.1 cm Wound Width (cm) 0.7 cm Wound Depth (cm) 0.3 cm Wound Volume (cm^3) 0.23 cm^3 Condition of Base Granulation Condition of Edges Calloused Assessment Red Tissue Type Percent Red 100 Undermining (cm) 0.2 cm Direction of Undermining 12 o'clock;6 o'clock Drainage Amount Small Wound Odor None Cleansing and Cleansing Agents  Normal saline Dressing Changed Changed/New Dressing Type Applied (acticoat, rolled gauze) Patient is taking Plavix daily.

## 2019-07-01 NOTE — DISCHARGE INSTRUCTIONS
Cleanse wound with normal saline. Apply Acticoat Flex 3: cut top approximate size of wound, apply to wound bed. Cover with coversite. Change three times as week or as needed if becomes soiled or loose. DO NOT GET WET. May purchase a cast cover from [employer] for showering or bathing. Information give to patient on purchasing diabetic wedge shoe from [employer]. Continue taking antibiotics. Infectious Disease Appt July 9th. Follow up with Vascular 7/2/19.

## 2019-07-01 NOTE — PROGRESS NOTES
Wound Center Progress Note    Patient: Freddie Juarez MRN: [id number]  SSN: [id number]    YOB: 1950  Age: 80 y.o. Sex: male      Subjective:     Chief Complaint:  Freddie Juarez is a 80 y.o. WHITE OR  male who presents with toes left, great toe wound of several months duration. History of Present Illness:     He has been through vascular studies which show him to have a lower toe pressure on the left than on the right probably disqualifying him for hyperbaric oxygen therapy. His next visit is to visit vascular surgery to see if there is anything surgical.  They can do to improve his tissue oxygenation. We did AT, on him today and his oxygen saturation in his toe on the went up to about 70 onon oxygen. Wound Caused By: chronic pressure/irritation due to poorly fitting shoes  Associated Signs and Symptoms: none  Timing: Intermittent  Quality: Burning  Severity: 1/10  Modifying Factors: low oxygen in the tissues. Current Wound Care: *see nurse's notes. He will see vascular surgery to decide whether vascular intervention is indicated. Past Medical History:   Diagnosis Date    Aneurysm [facility])     AAA  and right comomon iliac artery aneurysm    Benign hypertensive heart disease without CHF 6/22/2015    CAD (coronary artery disease)     Inferior MI (9/2007) and (10/27/08)  Vision BMS x 4 on 2007. Stent x 5 on 2008    Edema     Ischemic cardiomyopathy     [facility] (2007) EF 20%     [facility] (2008) EF 40%  echo (1/14/11): Mild LV dysfunction. EF 45%. Impaired relaxation. Mod. left atrial enlargement. Mild mitral/tricuspid/pulmonic regurgitation    Other diseases of lung, not elsewhere classified     CT of chest 8/2010:  5 mm right upper lobe pulmonary nodule. 1 mm nodule in left upper lobe. .  CT chest (5/7/12) Stable tiny right upper lobe pulmonary nodule suggesting benign etiology.         Past Surgical History:   Procedure Laterality Date    HX CORONARY STENT PLACEMENT stents x4 (2007) then 5 stents (2008)    HX HEART CATHETERIZATION      HX LUMBAR FUSION  2009    fusion L4, L5  (Dr. Brendon Reyes)    VASCULAR SURGERY PROCEDURE UNLIST  10/2013    x3 stents aorta     Family History   Problem Relation Age of Onset    Diabetes Father     Hypertension Father       Social History     Tobacco Use    Smoking status: Current Every Day Smoker     Packs/day: 1.00     Years: 45.00     Pack years: 45.00     Types: Cigarettes    Smokeless tobacco: Never Used   Substance Use Topics    Alcohol use: No     Alcohol/week: 0.0 oz       Prior to Admission medications    Medication Sig Start Date End Date Taking? Authorizing Provider   ampicillin (PRINCIPEN) 500 mg capsule Take 1 Cap by mouth Before breakfast, lunch, dinner and at bedtime for 10 days. 6/24/19 7/4/19  Pillo Schulz MD   clopidogrel (PLAVIX) 75 mg tab Take 1 Tab by mouth daily. 6/11/19   Rakel Tierney MD   lisinopril-hydroCHLOROthiazide (PRINZIDE, ZESTORETIC) 20-25 mg per tablet Take 1 Tab by mouth daily. 6/11/19   Rakel Tierney MD   metoprolol succinate (TOPROL-XL) 50 mg XL tablet Take 1 Tab by mouth daily. 6/11/19   Rakel Tierney MD   pravastatin (PRAVACHOL) 40 mg tablet Take 1 Tab by mouth nightly. 6/11/19   Rakel Tierney MD   doxycycline (MONODOX) 100 mg capsule Take 1 Cap by mouth two (2) times a day. 6/6/19   Cleopatra Anguiano MD   miscellaneous medical supply misc Glucose test meter, icd-10 E 11.8, check up to 3 times daily 6/6/19   Cleopatra Anguiano MD   [facility] medical [facility] misc Glucose test strips - icd-10 E11.8  - test up to 3 times daily 6/6/19   Cleopatra Anguiano MD   [facility] medical supply misc Lancets - use for up to 3 times daily glucose testing, icd-10 E 11.8 6/6/19   Cleopatra Anguiano MD   nitroglycerin (NITROSTAT) 0.4 mg SL tablet 1 Tab by SubLINGual route every five (5) minutes as needed for Chest Pain.  5/16/19   Rakel Tierney MD   metFORMIN (GLUCOPHAGE) 1,000 mg tablet Take 1 Tab by mouth two (2) times daily (with meals). 1/14/19   Ceci Odell MD   gabapentin (NEURONTIN) 600 mg tablet 2 tablets po three times daily 1/14/19   Ceci Odell MD   oxyCODONE IR (OXY-IR) 30 mg immediate release tablet Take 1 Tab by mouth four (4) times daily. Max Daily Amount: 120 mg. 4/30/15   Ceci Odell MD     Allergies   Allergen Reactions    Bactrim [Sulfamethoprim] Angioedema     Severe reaction, affected kidney function    Glipizide Rash    Morphine Nausea and Vomiting        Review of Systems:  A comprehensive review of systems was negative except for that written in the History of Present Illness. Lab Results   Component Value Date/Time    Hemoglobin A1c 9.2 (H) 04/29/2019 10:34 AM        Immunization History   Administered Date(s) Administered    TB Skin Test (PPD) Intradermal 02/15/2016       Body mass index is 27.7 kg/m². Counseling regarding nutrition done: No     Current medications:  Current Outpatient Medications   Medication Sig Dispense Refill    ampicillin (PRINCIPEN) 500 mg capsule Take 1 Cap by mouth Before breakfast, lunch, dinner and at bedtime for 10 days. 40 Cap 0    clopidogrel (PLAVIX) 75 mg tab Take 1 Tab by mouth daily. 90 Tab 3    lisinopril-hydroCHLOROthiazide (PRINZIDE, ZESTORETIC) 20-25 mg per tablet Take 1 Tab by mouth daily. 90 Tab 3    metoprolol succinate (TOPROL-XL) 50 mg XL tablet Take 1 Tab by mouth daily. 90 Tab 3    pravastatin (PRAVACHOL) 40 mg tablet Take 1 Tab by mouth nightly. 90 Tab 3    doxycycline (MONODOX) 100 mg capsule Take 1 Cap by mouth two (2) times a day.  28 Cap 1    miscellaneous medical supply misc Glucose test meter, icd-10 E 11.8, check up to 3 times daily 1 Each 0    miscellaneous medical supply misc Glucose test strips - icd-10 E11.8  - test up to 3 times daily 100 Each 11    miscellaneous medical supply misc Lancets - use for up to 3 times daily glucose testing, icd-10 E 11.8 100 Each 11    nitroglycerin (NITROSTAT) 0.4 mg SL tablet 1 Tab by SubLINGual route every five (5) minutes as needed for Chest Pain. 1 Bottle 6    metFORMIN (GLUCOPHAGE) 1,000 mg tablet Take 1 Tab by mouth two (2) times daily (with meals). 180 Tab 3    gabapentin (NEURONTIN) 600 mg tablet 2 tablets po three times daily 540 Tab 5    oxyCODONE IR (OXY-IR) 30 mg immediate release tablet Take 1 Tab by mouth four (4) times daily. Max Daily Amount: 120 mg. 120 Tab 0         Objective:     Physical Exam:     Visit Vitals  /75 (BP 1 Location: Left arm)   Pulse (!) 57   Temp 97.6 °F (36.4 °C)   Resp 18   Ht 6' 3\" (1.905 m)   Wt 100.5 kg (221 lb 9.6 oz)   SpO2 99%   BMI 27.70 kg/m²       General: well developed, well nourished, pleasant , NAD. Hygiene good  Psych: cooperative. Pleasant. No anxiety or depression. Normal mood and affect. Neuro: alert and oriented to person/place/situation. Otherwise nonfocal.  Derm: Normal turgor for age, dry skin  HEENT: Normocephalic, atraumatic. EOMI. Conjunctiva clear. No scleral icterus. Neck: Normal range of motion. No masses. Chest: Good air entry bilaterally. Respirations nonlabored  Cardio: Normal heart sounds,no rubs, murmurs or gallops  Abdomen: Soft, nontender, nondistended, normoactive bowel sounds  Lower extremities: color normal; temperature normal. Hair growth is not present. Calves are supple, nontender, approximately equally sized in comparison.  Capillary refill <3 sec      Diabetic Foot Ulcer/Neuropathic   Is Wound Greater than 1.0 sq cm ? : Yes  Re-Current Wound with Skin Substitue within Last Year : No  X-Ray in Last 3 Months: Yes(6/18/19)  RASHAD In Last 6 Months : Yes(6/21/19  L: 0.66 R: 0.94)  Smoking Status: Smoker  Wound Free from Infection : Wound Culture Completed(6/18/19)  Is Wound Free of Eschar, Slough , and / or Bio-Garner: Yes  Malignant Process in Wound : No Biopsy Done  Hemoglobin A1Cin Last 3 Months: Yes  Hemoglobin A1C Last result : 9  Type of off Loading: Patient is not off loading  Compression Therapy of 20mm or greater ?: No     Ulcer Description:   Wound Toe (Comment  which one) Posterior Great toe (Active)   Dressing Status Clean, dry, and intact 7/1/2019 11:36 AM   Non-staged Wound Description Full thickness 7/1/2019 11:36 AM   Wound Length (cm) 1.1 cm 7/1/2019 11:36 AM   Wound Width (cm) 0.7 cm 7/1/2019 11:36 AM   Wound Depth (cm) 0.3 cm 7/1/2019 11:36 AM   Wound Volume (cm^3) 0.23 cm^3 7/1/2019 11:36 AM   Condition of Base Granulation 7/1/2019 11:36 AM   Condition of Edges Calloused 7/1/2019 11:36 AM   Assessment Red 7/1/2019 11:36 AM   Tissue Type Percent Red 100 7/1/2019 11:36 AM   Tissue Type Percent Yellow 5 6/18/2019  2:45 PM   Undermining (cm) 0.2 cm 7/1/2019 11:36 AM   Direction of Undermining 12 o'clock;6 o'clock 7/1/2019 11:36 AM   Drainage Amount Small 7/1/2019 11:36 AM   Drainage Color Serosanguinous 6/24/2019 11:18 AM   Wound Odor None 7/1/2019 11:36 AM   Sherlyn-wound Assessment Other (Comment) 6/18/2019  2:45 PM   Cleansing and Cleansing Agents  Normal saline 7/1/2019 11:36 AM   Dressing Changed Changed/New 7/1/2019 11:36 AM   Number of days: 13         Data Review:   No results found for this or any previous visit (from the past 24 hour(s)). Assessment:     80 y.o. male with toes great toe diabetic ulcer.     Problem List  Date Reviewed: 6/6/2019          Codes Class Noted    Skin ulcer of toe of right foot with fat layer exposed ([facility] ICD-10-CM: Y67.746  ICD-9-CM: 707.15  6/18/2019        Sensory motor neuropathy ICD-10-CM: G62.9  ICD-9-CM: 356.9  5/1/2018        Type 2 diabetes with nephropathy ([facility] ICD-10-CM: E11.21  ICD-9-CM: 250.40, 583.81  4/30/2018        Type 2 diabetes mellitus with diabetic neuropathy ([facility] ICD-10-CM: E11.40  ICD-9-CM: 250.60, 357.2  4/30/2018        Polyneuropathy associated with underlying disease ([facility] (Chronic) ICD-10-CM: A18  ICD-9-CM: 357.4  1/25/2017        Iron deficiency anemia ICD-10-CM: D50.9  ICD-9-CM: 280.9  1/25/2017 Mixed hyperlipidemia ICD-10-CM: E78.2  ICD-9-CM: 272.2  9/9/2016        Psoriasis ICD-10-CM: L40.9  ICD-9-CM: 696.1  9/9/2016        Edema ICD-10-CM: R60.9  ICD-9-CM: 782.3  9/8/2016        Ischemic cardiomyopathy ICD-10-CM: I25.5  ICD-9-CM: 414.8  8/26/2016    Overview Addendum 6/12/2019 10:12 PM by Maryana Bustillo MD     1. LHC (9/2007) :  EF 20%  2. LHC (10/27/08):  40%  3. Echo (1/14/11): Mild LV dysfunction. EF 45%. Impaired relaxation. Moderate left atrial enlargement. Mild mitral/tricuspid/pulmonic regurgitation. 4.   Echo (2/16):  EF 55-60%. 5.  Echo (6/11/19):  EF 55-60%. Inferior HK. Mild LAE.  AVSC. Anemia, unspecified  ICD-10-CM: D64.9  ICD-9-CM: 285.9  8/26/2016    Overview Signed 8/26/2016  1:56 PM by Xander Bynum     1. Admitted April 2015 with anemia. Received 2 units. Iron deficiency. COPD (chronic obstructive pulmonary disease) (HCC) (Chronic) ICD-10-CM: J44.9  ICD-9-CM: 496  2/16/2016        Diabetes mellitus type 2, controlled ([facility] (Chronic) ICD-10-CM: E11.9  ICD-9-CM: 250.00  2/10/2016        MSSA (methicillin susceptible Staphylococcus aureus) septicemia ([facility] ICD-10-CM: A41.01  ICD-9-CM: 038.11  2/7/2016        Chronic pain (Chronic) ICD-10-CM: Y40.49  ICD-9-CM: 338.29  2/5/2016        Actinic keratosis (Chronic) ICD-10-CM: L57.0  ICD-9-CM: 702.0  10/19/2015        Peripheral neuropathy (Chronic) ICD-10-CM: G62.9  ICD-9-CM: 356.9  6/25/2015        Essential hypertension with goal blood pressure less than 140/90 ICD-10-CM: I10  ICD-9-CM: 401.9  6/22/2015        Tobacco abuse (Chronic) ICD-10-CM: Z72.0  ICD-9-CM: 305.1  4/21/2015        CAD (coronary artery disease) (Chronic) ICD-10-CM: I25.10  ICD-9-CM: 414.00  4/20/2015    Overview Addendum 9/8/2016  8:43 PM by Maryana Bustillo MD     1. Inferior MI (9/2007):  PCI :  Overlapping 2.5 X 23, 3 X 18, 3 X 28 and 4 X 23 Vision BMS  2. Inferior MI  (10/27/08)  a.   Cath: EF is 40% with inferior akinesis. Mild disease in left system. RCA: 50% diffuse in-stent restenosis. Acutely occluded at  the distal portion. b. PCI:  3.5 X 13 mm Cypher at PDA then entire stented segment(including previous stents) dilated to 4.25. PVD (peripheral vascular disease) (HCC) (Chronic) ICD-10-CM: I73.9  ICD-9-CM: 443.9  4/20/2015        Popliteal artery aneurysm, bilateral (HCC) (Chronic) ICD-10-CM: I72.4  ICD-9-CM: 442.3  6/23/2014        Abdominal aortic aneurysm [facility]) ICD-10-CM: I71.4  ICD-9-CM: 441.4  10/15/2013    Overview Addendum 9/8/2016  8:44 PM by MD Dr. Marquita Lane. 1.    Endovascular repair of AAA and right common iliac artery aneurysm (10/14/13)             Current smoker (Chronic) ICD-10-CM: F17.200  ICD-9-CM: 305.1  10/15/2013               Needs:  Good local wound care  Edema management  Nutrition optimization  Good Diabetic control    Plan:     Orders Placed This Encounter    WOUND CARE, DRESSING CHANGE     Cleanse wound with normal saline. Apply Acticoat Flex 3: cut top approximate size of wound, apply to wound bed. Cover with coversite. Change three times as week or as needed if becomes soiled or loose. DO NOT GET WET. May purchase a cast cover from [employer] for showering or bathing. Information give to patient on purchasing diabetic wedge shoe from [address]. Continue taking antibiotics. Infectious Disease Appt July 9th. Follow up with Vascular 7/2/19. Standing Status:   Standing     Number of Occurrences:   1        Patient understood and agrees with plan. Questions answered. Follow-up Information     Follow up With Specialties Details Why Contact Info    [address] In 1 week  Dave Walker [address] [address] [phone]             Any procedures done today in the [address] [facility] are documented in a separate note in [facility] and made part of this record by reference.      Dictated using voice recognition software; proofread, but unrecognized errors may exist.    Signed By: Edgard Fonseca MD     July 1, 2019

## 2019-07-01 NOTE — WOUND CARE
[address] [place] [address], [facility] [address] Phone: [phone] Fax: [phone] Patient: Edd Contreras MRN: [id number]  SSN: [id number] YOB: 1950  Age: 80 y.o. Sex: male Return Appointment: 1 week with Saman Bazzi MD 
 
Instructions:  
Cleanse wound with normal saline. Apply Acticoat Flex 3: cut top approximate size of wound, apply to wound bed. Cover with coversite. Change three times as week or as needed if becomes soiled or loose. DO NOT GET WET. May purchase a cast cover from [employer] for showering or bathing. Information give to patient on purchasing diabetic wedge shoe from [employer]. Continue taking antibiotics. Infectious Disease Appt July 9th. Follow up with Vascular 7/2/19. Should you experience increased redness, swelling, pain, foul odor, size of wound(s), or have a temperature over 101 degrees please contact the [address] [place] [address] Road at [phone] or if after hours contact your primary care physician or go to the hospital emergency department. Signed By: Medhat Meeks RN   
 July 1, 2019

## 2019-07-09 ENCOUNTER — HOSPITAL ENCOUNTER (OUTPATIENT)
Dept: WOUND CARE | Age: 69
Discharge: HOME | End: 2019-07-09
Attending: SURGERY
Payer: MEDICARE

## 2019-07-09 VITALS
WEIGHT: 223 LBS | BODY MASS INDEX: 27.73 KG/M2 | RESPIRATION RATE: 18 BRPM | HEIGHT: 75 IN | HEART RATE: 55 BPM | DIASTOLIC BLOOD PRESSURE: 63 MMHG | SYSTOLIC BLOOD PRESSURE: 109 MMHG | TEMPERATURE: 98.2 F | OXYGEN SATURATION: 96 %

## 2019-07-09 DIAGNOSIS — F17.200 CURRENT SMOKER: Chronic | ICD-10-CM

## 2019-07-09 DIAGNOSIS — G62.9 SENSORY MOTOR NEUROPATHY: ICD-10-CM

## 2019-07-09 DIAGNOSIS — L97.512 SKIN ULCER OF TOE OF RIGHT FOOT WITH FAT LAYER EXPOSED (HCC): ICD-10-CM

## 2019-07-09 DIAGNOSIS — E11.621 CONTROLLED TYPE 2 DIABETES MELLITUS WITH FOOT ULCER, UNSPECIFIED WHETHER LONG TERM INSULIN USE (HCC): Chronic | ICD-10-CM

## 2019-07-09 DIAGNOSIS — L97.509 CONTROLLED TYPE 2 DIABETES MELLITUS WITH FOOT ULCER, UNSPECIFIED WHETHER LONG TERM INSULIN USE (HCC): Chronic | ICD-10-CM

## 2019-07-09 PROCEDURE — 99214 OFFICE O/P EST MOD 30 MIN: CPT | Performed by: PHYSICAL MEDICINE & REHABILITATION

## 2019-07-09 PROCEDURE — 99214 OFFICE O/P EST MOD 30 MIN: CPT

## 2019-07-09 RX ORDER — AMOXICILLIN 500 MG/1
500 CAPSULE ORAL 3 TIMES DAILY
COMMUNITY
End: 2019-09-03

## 2019-07-09 NOTE — WOUND CARE
07/09/19 1440 Wound Toe (Comment  which one) Left;Plantar Great toe Date First Assessed/Time First Assessed: 06/18/19 1444   Primary Wound Type: Diabetic  Location: Toe (Comment  which one)  Wound Location Orientation: Left;Plantar  Wound Description: Great toe Dressing Status Clean, dry, and intact Dressing Type (Acticoat, Coversite) Non-staged Wound Description Full thickness Wound Length (cm) 0.9 cm Wound Width (cm) 0.7 cm Wound Depth (cm) 0.4 cm Wound Volume (cm^3) 0.25 cm^3 Condition of Base Granulation Condition of Edges Calloused Tissue Type Percent Red 100 Undermining (cm) 0.4 cm Direction of Undermining 6 o'clock;12 o'clock Drainage Amount Small Drainage Color Serous Wound Odor Mild Cleansing and Cleansing Agents  Normal saline Patient takes Plavix Daily

## 2019-07-09 NOTE — WOUND CARE
[address] [place] [address] [place] [address] Phone: [phone] Fax: [phone] Patient: David Hubbard MRN: [id number]  SSN: [id number] YOB: 1950  Age: 80 y.o. Sex: male Return Appointment: 2 weeks with Dr Jie Dong Instructions: Cleanse wound with normal saline. Apply Acticoat Flex 3: cut top approximate size of wound, apply to wound bed. Cover with Gauze and Wrap with Rolled Gauze. Change three times as week or as needed if becomes soiled or loose. DO NOT GET WET. May purchase a cast cover from [employer] for showering or bathing. Please purchase diabetic wedge shoe from [employer]. Continue taking antibiotics. Follow Up with Vascular for CTA Should you experience increased redness, swelling, pain, foul odor, size of wound(s), or have a temperature over 101 degrees please contact the [address] [place] [address] at [phone] or if after hours contact your primary care physician or go to the hospital emergency department. Signed By: Cristhian Louise RN   
 July 9, 2019

## 2019-07-12 NOTE — PROGRESS NOTES
[address], [address], [address] [place] [address] Rd  [phone]    Progress Notes   Serafin Perez       MRN: [id number]     : 1950     Age: 80 y.o. Subjective/HPI:   This patient is a 80 y.o. male seen and evaluated at the wound clinic for recheck of his left great toe plantar ulcer. I am seeing him today in the absence of Dr. Moisés Garcia who is away. The patient has peripheral arterial disease and is a smoker and a CTA with runoff is scheduled for 2019. He remains on doxycycline and is on amoxicillin 3 times daily and is followed by infectious disease. RASHAD on the left is 0.6. Has osteomyelitis. No fever or chills. Is supposed to be getting a Darco wedge for offloading. Underwent a transcutaneous oximetry test which did show valid response with the control and a baseline distal foot measurement on the left of 27 mmHg. He did respond to the oxygen challenge and increased up to 50 mmHg indicating some evidence of response to hyperbaric 100% oxygen. Unfortunately though, he continues to be a smoker. We discussed this at length today. Review of Systems  A comprehensive review of systems was negative except for that written in the HPI. Past Medical History:   Diagnosis Date    Aneurysm [facility])     AAA  and right comomon iliac artery aneurysm    Benign hypertensive heart disease without CHF 2015    CAD (coronary artery disease)     Inferior MI (2007) and (10/27/08)  Vision BMS x 4 on . Stent x 5 on     Edema     Ischemic cardiomyopathy     [facility] () EF 20%     [facility] () EF 40%  echo (11): Mild LV dysfunction. EF 45%. Impaired relaxation. Mod. left atrial enlargement. Mild mitral/tricuspid/pulmonic regurgitation    Other diseases of lung, not elsewhere classified     CT of chest 2010:  5 mm right upper lobe pulmonary nodule. 1 mm nodule in left upper lobe. .  CT chest (12) Stable tiny right upper lobe pulmonary nodule suggesting benign etiology. Past Surgical History:   Procedure Laterality Date    HX CORONARY STENT PLACEMENT      stents x4 (2007) then 5 stents (2008)    HX HEART CATHETERIZATION      HX LUMBAR FUSION  2009    fusion L4, L5  (Dr. Zoey Doss)    VASCULAR SURGERY PROCEDURE UNLIST  10/2013    x3 stents aorta      Allergies   Allergen Reactions    Bactrim [Sulfamethoprim] Angioedema     Severe reaction, affected kidney function    Glipizide Rash    Morphine Nausea and Vomiting      Social History     Tobacco Use    Smoking status: Current Every Day Smoker     Packs/day: 1.00     Years: 45.00     Pack years: 45.00     Types: Cigarettes    Smokeless tobacco: Never Used   Substance Use Topics    Alcohol use: No     Alcohol/week: 0.0 oz      Social History     Social History Narrative    Not on file     Family History   Problem Relation Age of Onset    Diabetes Father     Hypertension Father       Cannot display prior to admission medications because the patient has not been admitted in this contact. Current Outpatient Medications   Medication Sig    amoxicillin (AMOXIL) 500 mg capsule Take 500 mg by mouth three (3) times daily.  clopidogrel (PLAVIX) 75 mg tab Take 1 Tab by mouth daily.  lisinopril-hydroCHLOROthiazide (PRINZIDE, ZESTORETIC) 20-25 mg per tablet Take 1 Tab by mouth daily.  metoprolol succinate (TOPROL-XL) 50 mg XL tablet Take 1 Tab by mouth daily.  pravastatin (PRAVACHOL) 40 mg tablet Take 1 Tab by mouth nightly.  doxycycline (MONODOX) 100 mg capsule Take 1 Cap by mouth two (2) times a day.     miscellaneous medical supply misc Glucose test meter, icd-10 E 11.8, check up to 3 times daily    miscellaneous medical supply misc Glucose test strips - icd-10 E11.8  - test up to 3 times daily    miscellaneous medical supply misc Lancets - use for up to 3 times daily glucose testing, icd-10 E 11.8    nitroglycerin (NITROSTAT) 0.4 mg SL tablet 1 Tab by SubLINGual route every five (5) minutes as needed for Chest Pain.  metFORMIN (GLUCOPHAGE) 1,000 mg tablet Take 1 Tab by mouth two (2) times daily (with meals).  gabapentin (NEURONTIN) 600 mg tablet 2 tablets po three times daily (Patient taking differently: Take 600 mg by mouth three (3) times daily. 2 tablets po three times daily  Indications: Neuropathic Pain)    oxyCODONE IR (OXY-IR) 30 mg immediate release tablet Take 1 Tab by mouth four (4) times daily. Max Daily Amount: 120 mg. No current facility-administered medications for this encounter. Objective:     Vitals:    07/09/19 1431   BP: 109/63   Pulse: (!) 55   Resp: 18   Temp: 98.2 °F (36.8 °C)   SpO2: 96%   Weight: 223 lb (101.2 kg)   Height: 6' 3\" (1.905 m)       Physical Exam:  BMI: High BMI  Ambulation: Ambulatory  Gen- the patient is well developed and in no acute distress  HEENT- no focal abnormalities of the scalp or face. Neck- no JVD or retractions  Lungs- normal respiratory effort. Abd- soft and no masses evident. Ext- warm without cyanosis. Skin- no jaundice or rashes. Please see the specific measurements and descriptions of the wound(s) below. There is no evidence of periwound induration or warmth. No purulence or odor. Periwound callus is pared today without bleeding or pain. Neuro- alert and oriented x 3. No gross imotor deficits are present. Lower limb sensation is impaired with loss of protective sensation. Shaneryl Gómez Psychological: Affect normal. Mood normal. Memory intact.      Wound Toe (Comment  which one) Left;Plantar Great toe (Active)   Dressing Status Clean, dry, and intact 7/9/2019  2:40 PM   Non-staged Wound Description Full thickness 7/9/2019  2:40 PM   Wound Length (cm) 0.9 cm 7/9/2019  2:40 PM   Wound Width (cm) 0.7 cm 7/9/2019  2:40 PM   Wound Depth (cm) 0.4 cm 7/9/2019  2:40 PM   Wound Volume (cm^3) 0.25 cm^3 7/9/2019  2:40 PM   Condition of Base Granulation 7/9/2019  2:40 PM   Condition of Edges Calloused 7/9/2019  2:40 PM   Assessment Red 7/1/2019 11:36 AM   Tissue Type Percent Red 100 7/9/2019  2:40 PM   Tissue Type Percent Yellow 5 6/18/2019  2:45 PM   Undermining (cm) 0.4 cm 7/9/2019  2:40 PM   Direction of Undermining 6 o'clock;12 o'clock 7/9/2019  2:40 PM   Drainage Amount Small 7/9/2019  2:40 PM   Drainage Color Serous 7/9/2019  2:40 PM   Wound Odor Mild 7/9/2019  2:40 PM   Sherlyn-wound Assessment Other (Comment) 6/18/2019  2:45 PM   Cleansing and Cleansing Agents  Normal saline 7/9/2019  2:40 PM   Dressing Changed Changed/New 7/1/2019 11:36 AM   Number of days: 24            Data Review     All Micro Results     None        All Micro Results     None        Radiology  Assessment:     Patient Active Problem List   Diagnosis Code    Abdominal aortic aneurysm ([facility]) I71.4    Current smoker F17.200    Popliteal artery aneurysm, bilateral ([facility]) I72.4    CAD (coronary artery disease) I25.10    PVD (peripheral vascular disease) ([facility]) I73.9    Tobacco abuse Z72.0    Essential hypertension with goal blood pressure less than 140/90 I10    Peripheral neuropathy G62.9    Actinic keratosis L57.0    Chronic pain G89.29    MSSA (methicillin susceptible Staphylococcus aureus) septicemia ([address] A41.01    Diabetes mellitus type 2, controlled ([address] E11.9    COPD (chronic obstructive pulmonary disease) ([facility]) J44.9    Ischemic cardiomyopathy I25.5    Anemia, unspecified  D64.9    Edema R60.9    Mixed hyperlipidemia E78.2    Psoriasis L40.9    Polyneuropathy associated with underlying disease ([address] G63    Iron deficiency anemia D50.9    Type 2 diabetes with nephropathy ([facility]) E11.21    Type 2 diabetes mellitus with diabetic neuropathy ([facility]) E11.40    Sensory motor neuropathy G62.9    Skin ulcer of toe of right foot with fat layer exposed ([address] L97.512     Plan:      Active Orders   Nursing    WOUND CARE, DRESSING CHANGE     Frequency: ONE TIME     Number of Occurrences: 1 Occurrences     Order Comments: Cleanse wound with normal saline. Apply Acticoat Flex 3: cut top approximate size of wound, apply to wound bed. Cover with Gauze and Wrap with Rolled Gauze. Change three times as week or as needed if becomes soiled or loose. DO NOT GET WET. May purchase a cast cover from [employer] for showering or bathing. Please purchase diabetic wedge shoe from [employer]. Continue taking antibiotics. Follow Up with Vascular for CTA     Wound Ostomy    TCOM (TRANSCUTANEOUS OXIMETRY)     Frequency: ONE TIME     Number of Occurrences: 1 Occurrences     Order Comments: To be completed in wound center to evaluate for HBO Therapy. Follow-up Information     Follow up With Specialties Details Why Contact Info    [address] In 2 weeks  [facility] [address]        We will await the results from his CTA with runoff to see if anything can be revascularized. If there is a positive response from this procedure, he may be a good candidate for hyperbaric oxygen provided he can commit to smoking reduction/cessation. Recheck in 2 weeks. Dictated using voice recognition software. Proofread, but unrecognized errors may exist.       Thank you very much for the opportunity to participate in this patient's care.       Signed By: Renetta Shah MD     July 12, 2019

## 2019-07-16 ENCOUNTER — HOSPITAL ENCOUNTER (OUTPATIENT)
Dept: CT IMAGING | Age: 69
Discharge: HOME | End: 2019-07-16
Attending: SURGERY
Payer: MEDICARE

## 2019-07-16 DIAGNOSIS — I73.9 PVD (PERIPHERAL VASCULAR DISEASE) WITH CLAUDICATION (HCC): ICD-10-CM

## 2019-07-16 LAB — CREAT BLD-MCNC: 1.2 MG/DL (ref 0.8–1.5)

## 2019-07-16 PROCEDURE — 82565 ASSAY OF CREATININE: CPT

## 2019-07-16 PROCEDURE — 74011636320 HC RX REV CODE- 636/320: Performed by: SURGERY

## 2019-07-16 PROCEDURE — 75635 CT ANGIO ABDOMINAL ARTERIES: CPT

## 2019-07-16 PROCEDURE — 74011000258 HC RX REV CODE- 258: Performed by: SURGERY

## 2019-07-16 RX ORDER — SODIUM CHLORIDE 0.9 % (FLUSH) 0.9 %
10 SYRINGE (ML) INJECTION
Status: COMPLETED | OUTPATIENT
Start: 2019-07-16 | End: 2019-07-16

## 2019-07-16 RX ADMIN — IOPAMIDOL 125 ML: 755 INJECTION, SOLUTION INTRAVENOUS at 10:54

## 2019-07-16 RX ADMIN — Medication 10 ML: at 10:54

## 2019-07-16 RX ADMIN — SODIUM CHLORIDE 100 ML: 900 INJECTION, SOLUTION INTRAVENOUS at 10:54

## 2019-11-08 ENCOUNTER — HOSPITAL ENCOUNTER (EMERGENCY)
Age: 69
Discharge: HOME | End: 2019-11-08
Attending: EMERGENCY MEDICINE
Payer: MEDICARE

## 2019-11-08 ENCOUNTER — APPOINTMENT (OUTPATIENT)
Dept: GENERAL RADIOLOGY | Age: 69
End: 2019-11-08
Attending: EMERGENCY MEDICINE
Payer: MEDICARE

## 2019-11-08 VITALS
HEIGHT: 75 IN | BODY MASS INDEX: 27.85 KG/M2 | RESPIRATION RATE: 16 BRPM | SYSTOLIC BLOOD PRESSURE: 127 MMHG | DIASTOLIC BLOOD PRESSURE: 71 MMHG | HEART RATE: 70 BPM | TEMPERATURE: 98.2 F | OXYGEN SATURATION: 93 % | WEIGHT: 224 LBS

## 2019-11-08 DIAGNOSIS — G60.9 IDIOPATHIC PERIPHERAL NEUROPATHY: Primary | ICD-10-CM

## 2019-11-08 DIAGNOSIS — G89.4 CHRONIC PAIN SYNDROME: ICD-10-CM

## 2019-11-08 DIAGNOSIS — R79.89 ELEVATED SERUM CREATININE: ICD-10-CM

## 2019-11-08 LAB
ALBUMIN SERPL-MCNC: 3.3 G/DL (ref 3.2–4.6)
ALBUMIN/GLOB SERPL: 0.6 {RATIO} (ref 1.2–3.5)
ALP SERPL-CCNC: 91 U/L (ref 50–136)
ALT SERPL-CCNC: 22 U/L (ref 12–65)
ANION GAP SERPL CALC-SCNC: 3 MMOL/L (ref 7–16)
AST SERPL-CCNC: 28 U/L (ref 15–37)
BASOPHILS # BLD: 0.1 K/UL (ref 0–0.2)
BASOPHILS NFR BLD: 1 % (ref 0–2)
BILIRUB SERPL-MCNC: 0.5 MG/DL (ref 0.2–1.1)
BUN SERPL-MCNC: 37 MG/DL (ref 8–23)
CALCIUM SERPL-MCNC: 8.5 MG/DL (ref 8.3–10.4)
CHLORIDE SERPL-SCNC: 103 MMOL/L (ref 98–107)
CO2 SERPL-SCNC: 30 MMOL/L (ref 21–32)
CREAT SERPL-MCNC: 1.8 MG/DL (ref 0.8–1.5)
DIFFERENTIAL METHOD BLD: ABNORMAL
EOSINOPHIL # BLD: 0.3 K/UL (ref 0–0.8)
EOSINOPHIL NFR BLD: 4 % (ref 0.5–7.8)
ERYTHROCYTE [DISTWIDTH] IN BLOOD BY AUTOMATED COUNT: 14.1 % (ref 11.9–14.6)
GLOBULIN SER CALC-MCNC: 5.4 G/DL (ref 2.3–3.5)
GLUCOSE BLD STRIP.AUTO-MCNC: 186 MG/DL (ref 65–100)
GLUCOSE SERPL-MCNC: 182 MG/DL (ref 65–100)
HCT VFR BLD AUTO: 42.9 % (ref 41.1–50.3)
HGB BLD-MCNC: 13.7 G/DL (ref 13.6–17.2)
IMM GRANULOCYTES # BLD AUTO: 0 K/UL (ref 0–0.5)
IMM GRANULOCYTES NFR BLD AUTO: 0 % (ref 0–5)
LYMPHOCYTES # BLD: 2 K/UL (ref 0.5–4.6)
LYMPHOCYTES NFR BLD: 25 % (ref 13–44)
MCH RBC QN AUTO: 29.1 PG (ref 26.1–32.9)
MCHC RBC AUTO-ENTMCNC: 31.9 G/DL (ref 31.4–35)
MCV RBC AUTO: 91.3 FL (ref 79.6–97.8)
MONOCYTES # BLD: 0.6 K/UL (ref 0.1–1.3)
MONOCYTES NFR BLD: 8 % (ref 4–12)
NEUTS SEG # BLD: 4.8 K/UL (ref 1.7–8.2)
NEUTS SEG NFR BLD: 62 % (ref 43–78)
NRBC # BLD: 0 K/UL (ref 0–0.2)
PLATELET # BLD AUTO: 124 K/UL (ref 150–450)
PMV BLD AUTO: 11.7 FL (ref 9.4–12.3)
POTASSIUM SERPL-SCNC: 4.7 MMOL/L (ref 3.5–5.1)
PROT SERPL-MCNC: 8.7 G/DL (ref 6.3–8.2)
RBC # BLD AUTO: 4.7 M/UL (ref 4.23–5.6)
SODIUM SERPL-SCNC: 136 MMOL/L (ref 136–145)
WBC # BLD AUTO: 7.8 K/UL (ref 4.3–11.1)

## 2019-11-08 PROCEDURE — 85025 COMPLETE CBC W/AUTO DIFF WBC: CPT

## 2019-11-08 PROCEDURE — 80053 COMPREHEN METABOLIC PANEL: CPT

## 2019-11-08 PROCEDURE — 73610 X-RAY EXAM OF ANKLE: CPT

## 2019-11-08 PROCEDURE — 82962 GLUCOSE BLOOD TEST: CPT

## 2019-11-08 PROCEDURE — 99283 EMERGENCY DEPT VISIT LOW MDM: CPT | Performed by: EMERGENCY MEDICINE

## 2019-11-08 PROCEDURE — 73590 X-RAY EXAM OF LOWER LEG: CPT

## 2019-11-08 RX ORDER — OXYCODONE HYDROCHLORIDE 10 MG/1
10 TABLET ORAL
Qty: 11 TAB | Refills: 0 | Status: SHIPPED | OUTPATIENT
Start: 2019-11-08 | End: 2019-12-08

## 2019-11-09 NOTE — ED PROVIDER NOTES
72-year-old male with a history of diabetes and smoking  Also has a history of peripheral neuropathy. Presents tonight with complaints of left shin pain. No trauma    No fever    Pain has been progressive over several days but the patient is failed to contact any of his providers to discuss his symptoms. The history is provided by the patient. Toe Pain    Associated symptoms include tingling and back pain. Pertinent negatives include no numbness and no neck pain. Leg Pain    This is a chronic problem. The current episode started more than 2 days ago. The problem occurs constantly. The problem has been gradually worsening. The pain is present in the left lower leg. The quality of the pain is described as dull and aching. The pain is severe. Associated symptoms include tingling and back pain. Pertinent negatives include no numbness and no neck pain. The symptoms are aggravated by standing and activity. Treatments tried: Patient takes oxycodone 20 mg. The treatment provided mild relief. There has been no history of extremity trauma. Past Medical History:   Diagnosis Date    Aneurysm [facility])     AAA  and right comomon iliac artery aneurysm    Benign hypertensive heart disease without CHF 6/22/2015    CAD (coronary artery disease)     Inferior MI (9/2007) and (10/27/08)  Vision BMS x 4 on 2007. Stent x 5 on 2008    Edema     Ischemic cardiomyopathy     [facility] (2007) EF 20%     [facility] (2008) EF 40%  echo (1/14/11): Mild LV dysfunction. EF 45%. Impaired relaxation. Mod. left atrial enlargement. Mild mitral/tricuspid/pulmonic regurgitation    Other diseases of lung, not elsewhere classified     CT of chest 8/2010:  5 mm right upper lobe pulmonary nodule. 1 mm nodule in left upper lobe. .  CT chest (5/7/12) Stable tiny right upper lobe pulmonary nodule suggesting benign etiology.          Past Surgical History:   Procedure Laterality Date    HX CORONARY STENT PLACEMENT      stents x4 (2007) then 5 stents (2008)    HX HEART CATHETERIZATION      HX LUMBAR FUSION  2009    fusion L4, L5  (Dr. Anna Paul)    VASCULAR SURGERY PROCEDURE UNLIST  10/2013    x3 stents aorta         Family History:   Problem Relation Age of Onset    Diabetes Father     Hypertension Father        Social History     Socioeconomic History    Marital status:      Spouse name: Not on file    Number of children: Not on file    Years of education: Not on file    Highest education level: Not on file   Occupational History    Not on file   Social Needs    Financial resource strain: Not on file    Food insecurity:     Worry: Not on file     Inability: Not on file    Transportation needs:     Medical: Not on file     Non-medical: Not on file   Tobacco Use    Smoking status: Current Every Day Smoker     Packs/day: 1.00     Years: 45.00     Pack years: 45.00     Types: Cigarettes    Smokeless tobacco: Never Used   Substance and Sexual Activity    Alcohol use: No     Alcohol/week: 0.0 standard drinks    Drug use: No     Types: Prescription, OTC    Sexual activity: Not on file   Lifestyle    Physical activity:     Days per week: Not on file     Minutes per session: Not on file    Stress: Not on file   Relationships    Social connections:     Talks on phone: Not on file     Gets together: Not on file     Attends Islam service: Not on file     Active member of club or organization: Not on file     Attends meetings of clubs or organizations: Not on file     Relationship status: Not on file    Intimate partner violence:     Fear of current or ex partner: Not on file     Emotionally abused: Not on file     Physically abused: Not on file     Forced sexual activity: Not on file   Other Topics Concern    Not on file   Social History Narrative    Not on file         ALLERGIES: Bactrim [sulfamethoprim]; Glipizide; and Morphine    Review of Systems   Constitutional: Negative for activity change, chills, diaphoresis and fever. HENT: Negative for dental problem, hearing loss, nosebleeds, rhinorrhea and sore throat. Eyes: Negative for pain, discharge, redness and visual disturbance. Respiratory: Negative for cough, chest tightness and shortness of breath. Cardiovascular: Negative for chest pain, palpitations and leg swelling. Gastrointestinal: Negative for abdominal pain, constipation, diarrhea, nausea and vomiting. Endocrine: Negative for cold intolerance, heat intolerance, polydipsia and polyuria. Genitourinary: Negative for dysuria and flank pain. Musculoskeletal: Positive for arthralgias, back pain and myalgias. Negative for joint swelling and neck pain. Skin: Negative for pallor and rash. Allergic/Immunologic: Negative for environmental allergies and food allergies. Neurological: Positive for tingling. Negative for dizziness, tremors, light-headedness, numbness and headaches. Hematological: Negative for adenopathy. Does not bruise/bleed easily. Psychiatric/Behavioral: Negative for confusion and dysphoric mood. The patient is not nervous/anxious and is not hyperactive. All other systems reviewed and are negative. Vitals:    11/08/19 2028   BP: 125/62   Pulse: 71   Resp: 18   Temp: 98.2 °F (36.8 °C)   SpO2: 93%   Weight: 101.6 kg (224 lb)   Height: 6' 3\" (1.905 m)            Physical Exam   Constitutional: He is oriented to person, place, and time. He appears well-developed and well-nourished. He appears distressed. HENT:   Head: Normocephalic and atraumatic. Mouth/Throat: No oropharyngeal exudate. Eyes: Pupils are equal, round, and reactive to light. Conjunctivae and EOM are normal. No scleral icterus. Neck: Normal range of motion. Neck supple. No JVD present. No thyromegaly present. Cardiovascular: Normal rate, regular rhythm, normal heart sounds and intact distal pulses. Exam reveals no gallop and no friction rub. No murmur heard.   Pulmonary/Chest: Effort normal and breath sounds normal. No respiratory distress. He has no wheezes. Abdominal: Soft. Bowel sounds are normal. He exhibits no distension. There is no hepatosplenomegaly. There is no tenderness. Musculoskeletal: Normal range of motion. He exhibits edema. He exhibits no tenderness or deformity. Left anterior shin region reveals mild warmth. Skin is relatively pale. There are no open wounds    1-2+ edema noted in the ankle and lower leg    Dorsalis pedis pulse dopplerable    Left great toe does reveal a superficial ulceration. There is no erythema or drainage from the wound   Neurological: He is alert and oriented to person, place, and time. No cranial nerve deficit or sensory deficit. He exhibits normal muscle tone. Coordination normal.   Skin: Skin is warm and dry. Capillary refill takes less than 2 seconds. No rash noted. Psychiatric: His speech is normal and behavior is normal. Judgment and thought content normal. His affect is blunt. Cognition and memory are normal.   Nursing note and vitals reviewed. MDM  Number of Diagnoses or Management Options  Chronic pain syndrome: established and worsening  Elevated serum creatinine: new and requires workup  Idiopathic peripheral neuropathy: established and worsening  Diagnosis management comments: Patient's controlled substance prescription records reviewed @ the [facility] website. Information will be utilized for accurate and appropriate patient care.   10/11/2019   1     10/11/2019   Oxycodone Hcl 20 Mg Tablet         100.00  25  Diane Akersnegro   [id number]   Dannielle ([id number])   0/0  120.00 MME  [place] [place]   09/11/2019   1     09/11/2019   Oxycodone Hcl 20 Mg Tablet         100.00  25  [facility]   [id number]   Dannielle ([id number])   0/0  120.00 MME  [place] [place]   08/12/2019   1     08/12/2019   Oxycodone Hcl 20 Mg Tablet         105.00  26  [facility]   [id number]   Dannielle ([id number])   0/0  121.15 MME  [place] [place]   07/13/2019   1     06/03/2019   Oxycodone Hcl 20 Mg Tablet         110.00  30  Sa Hen   [id number]   ASQ ([id number])   0/0  110.00 MME  [facility] [place]   06/13/2019   1     06/03/2019   Oxycodone Hcl 20 Mg Tablet         115.00  30   Bro   [id number]   Dannielle ([id number])   0/0  115.00 MME  [facility] [place]   05/13/2019   1     04/05/2019   Oxycodone Hcl 20 Mg Tablet         120.00  30  Br Mauri   [id number]   Dannielle ([id number])   0/0  120.00 MME  [facility] [place]   04/13/2019   1     04/05/2019   Oxycodone Hcl 20 Mg Tablet         120.00  30  Br Mauri   [id number]   Dannielle ([id number])   0/0  120.00 MME  [facility] [place]   03/14/2019   1     02/08/2019   Oxycodone Hcl 30 Mg Tablet         90.00  30  Lovejoy Maid   [id number]   Dannielle ([id number])   0/0  135.00 MME  [facility] [place]   02/12/2019   1     02/08/2019   Oxycodone Hcl 30 Mg Tablet         105.00  30  Lovejoy Maid   [id number]   Dannielle ([id number])   0/0  157.50 MME  [facility] [place]   01/12/2019   1     01/11/2019   Oxycodone Hcl 30 Mg Tablet         120.00  30  Mi Gri   [id number]   Dannielle ([id number])   0/0  180.00 MME  [facility] [place]       9:00 PM  Patient symptoms appear most likely related to chronic neuropathy pain. We will go ahead and check lab work and x-rays to rule out other causes       Amount and/or Complexity of Data Reviewed  Clinical lab tests: ordered and reviewed  Tests in the radiology section of CPT®: ordered and reviewed  Review and summarize past medical records: yes  Independent visualization of images, tracings, or specimens: yes    Risk of Complications, Morbidity, and/or Mortality  Presenting problems: moderate  Diagnostic procedures: moderate  Management options: moderate  General comments: Elements of this note have been dictated via voice recognition software. Text and phrases may be limited by the accuracy of the software. The chart has been reviewed, but errors may still be present.       Patient Progress  Patient progress: stable         Procedures

## 2019-11-09 NOTE — ED TRIAGE NOTES
Reports left lower extremity pain located at shin and left great toe pain. States wound to toe onset approx 6 months pta. Followed by wound clinic. Prescribed oxycodone by [facility] however denies relief with meds. Hx DM, noncompliant with glucometer however reports compliance with oral meds. Denies fever or chills. Denies injury or trauma to leg, states PVD. Last ate approx 1400, yudith greens.  Reports has been drinking sunkist pta. bgl 186 in triage

## 2019-11-09 NOTE — ED NOTES
I have reviewed discharge instructions with the patient. The patient verbalized understanding. Patient left ED via Discharge Method: wheelchair to Home with family. Opportunity for questions and clarification provided. Patient given 1 scripts.

## 2019-11-09 NOTE — DISCHARGE INSTRUCTIONS
Follow-up with your primary care doctor Monday to monitor your kidney function tests  Follow-up with your pain management doctor Monday to determine the appropriate management of your chronic pain  Return to ER for any worsening symptoms or new problems which may arise

## 2019-12-09 ENCOUNTER — HOSPITAL ENCOUNTER (OUTPATIENT)
Dept: CT IMAGING | Age: 69
Discharge: HOME | End: 2019-12-09
Attending: SURGERY
Payer: MEDICARE

## 2019-12-09 VITALS — BODY MASS INDEX: 27.85 KG/M2 | WEIGHT: 224 LBS | HEIGHT: 75 IN

## 2019-12-09 DIAGNOSIS — I73.9 PVD (PERIPHERAL VASCULAR DISEASE) WITH CLAUDICATION (HCC): ICD-10-CM

## 2019-12-09 LAB — CREAT BLD-MCNC: 1.2 MG/DL (ref 0.8–1.5)

## 2019-12-09 PROCEDURE — 82565 ASSAY OF CREATININE: CPT

## 2019-12-09 PROCEDURE — 74011636320 HC RX REV CODE- 636/320: Performed by: SURGERY

## 2019-12-09 PROCEDURE — 74011000258 HC RX REV CODE- 258: Performed by: SURGERY

## 2019-12-09 PROCEDURE — 75635 CT ANGIO ABDOMINAL ARTERIES: CPT

## 2019-12-09 RX ORDER — SODIUM CHLORIDE 0.9 % (FLUSH) 0.9 %
10 SYRINGE (ML) INJECTION
Status: COMPLETED | OUTPATIENT
Start: 2019-12-09 | End: 2019-12-09

## 2019-12-09 RX ADMIN — SODIUM CHLORIDE 100 ML: 900 INJECTION, SOLUTION INTRAVENOUS at 09:12

## 2019-12-09 RX ADMIN — IOPAMIDOL 100 ML: 755 INJECTION, SOLUTION INTRAVENOUS at 09:12

## 2019-12-09 RX ADMIN — Medication 10 ML: at 09:12

## 2020-01-02 ENCOUNTER — HOSPITAL ENCOUNTER (OUTPATIENT)
Dept: SURGERY | Age: 70
Discharge: HOME | End: 2020-01-02
Payer: MEDICARE

## 2020-01-02 VITALS
WEIGHT: 218.1 LBS | HEIGHT: 75 IN | DIASTOLIC BLOOD PRESSURE: 71 MMHG | RESPIRATION RATE: 16 BRPM | SYSTOLIC BLOOD PRESSURE: 132 MMHG | OXYGEN SATURATION: 95 % | TEMPERATURE: 97.5 F | BODY MASS INDEX: 27.12 KG/M2

## 2020-01-02 LAB
ANION GAP SERPL CALC-SCNC: 2 MMOL/L (ref 7–16)
ATRIAL RATE: 53 BPM
BUN SERPL-MCNC: 16 MG/DL (ref 8–23)
CALCIUM SERPL-MCNC: 9.1 MG/DL (ref 8.3–10.4)
CALCULATED P AXIS, ECG09: 75 DEGREES
CALCULATED R AXIS, ECG10: 91 DEGREES
CALCULATED T AXIS, ECG11: 79 DEGREES
CHLORIDE SERPL-SCNC: 104 MMOL/L (ref 98–107)
CO2 SERPL-SCNC: 32 MMOL/L (ref 21–32)
CREAT SERPL-MCNC: 1.2 MG/DL (ref 0.8–1.5)
DIAGNOSIS, 93000: NORMAL
ERYTHROCYTE [DISTWIDTH] IN BLOOD BY AUTOMATED COUNT: 15.2 % (ref 11.9–14.6)
GLUCOSE BLD STRIP.AUTO-MCNC: 179 MG/DL (ref 65–100)
GLUCOSE SERPL-MCNC: 164 MG/DL (ref 65–100)
HCT VFR BLD AUTO: 44.5 % (ref 41.1–50.3)
HGB BLD-MCNC: 14.1 G/DL (ref 13.6–17.2)
MCH RBC QN AUTO: 29 PG (ref 26.1–32.9)
MCHC RBC AUTO-ENTMCNC: 31.7 G/DL (ref 31.4–35)
MCV RBC AUTO: 91.4 FL (ref 79.6–97.8)
NRBC # BLD: 0 K/UL (ref 0–0.2)
P-R INTERVAL, ECG05: 200 MS
PLATELET # BLD AUTO: 100 K/UL (ref 150–450)
PMV BLD AUTO: 12.4 FL (ref 9.4–12.3)
POTASSIUM SERPL-SCNC: 4.4 MMOL/L (ref 3.5–5.1)
Q-T INTERVAL, ECG07: 432 MS
QRS DURATION, ECG06: 106 MS
QTC CALCULATION (BEZET), ECG08: 405 MS
RBC # BLD AUTO: 4.87 M/UL (ref 4.23–5.6)
SODIUM SERPL-SCNC: 138 MMOL/L (ref 136–145)
VENTRICULAR RATE, ECG03: 53 BPM
WBC # BLD AUTO: 6.5 K/UL (ref 4.3–11.1)

## 2020-01-02 PROCEDURE — 82962 GLUCOSE BLOOD TEST: CPT

## 2020-01-02 PROCEDURE — 85027 COMPLETE CBC AUTOMATED: CPT

## 2020-01-02 PROCEDURE — 80048 BASIC METABOLIC PNL TOTAL CA: CPT

## 2020-01-02 PROCEDURE — 93005 ELECTROCARDIOGRAM TRACING: CPT | Performed by: ANESTHESIOLOGY

## 2020-01-02 RX ORDER — ASPIRIN 81 MG/1
81 TABLET ORAL DAILY
COMMUNITY
End: 2021-12-10

## 2020-01-02 NOTE — PERIOP NOTES
Recent Results (from the past 12 hour(s))   GLUCOSE, POC    Collection Time: 01/02/20 10:32 AM   Result Value Ref Range    Glucose (POC) 179 (H) 65 - 100 mg/dL   CBC W/O DIFF    Collection Time: 01/02/20 10:37 AM   Result Value Ref Range    WBC 6.5 4.3 - 11.1 K/uL    RBC 4.87 4.23 - 5.6 M/uL    HGB 14.1 13.6 - 17.2 g/dL    HCT 44.5 41.1 - 50.3 %    MCV 91.4 79.6 - 97.8 FL    MCH 29.0 26.1 - 32.9 PG    MCHC 31.7 31.4 - 35.0 g/dL    RDW 15.2 (H) 11.9 - 14.6 %    PLATELET 157 (L) 686 - 450 K/uL    MPV 12.4 (H) 9.4 - 12.3 FL    ABSOLUTE NRBC 0.00 0.0 - 0.2 K/uL   METABOLIC PANEL, BASIC    Collection Time: 01/02/20 10:37 AM   Result Value Ref Range    Sodium 138 136 - 145 mmol/L    Potassium 4.4 3.5 - 5.1 mmol/L    Chloride 104 98 - 107 mmol/L    CO2 32 21 - 32 mmol/L    Anion gap 2 (L) 7 - 16 mmol/L    Glucose 164 (H) 65 - 100 mg/dL    BUN 16 8 - 23 MG/DL    Creatinine 1.20 0.8 - 1.5 MG/DL    GFR est AA >60 >60 ml/min/1.73m2    GFR est non-AA >60 >60 ml/min/1.73m2    Calcium 9.1 8.3 - 10.4 MG/DL   EKG, 12 LEAD, INITIAL    Collection Time: 01/02/20 11:34 AM   Result Value Ref Range    Ventricular Rate 53 BPM    Atrial Rate 53 BPM    P-R Interval 200 ms    QRS Duration 106 ms    Q-T Interval 432 ms    QTC Calculation (Bezet) 405 ms    Calculated P Axis 75 degrees    Calculated R Axis 91 degrees    Calculated T Axis 79 degrees    Diagnosis       Sinus bradycardia  Rightward axis  Possible Inferior infarct (cited on or before 04-DEC-2017)  Abnormal ECG  When compared with ECG of 04-DEC-2017 19:37,  Premature ventricular complexes are no longer Present  Vent. rate has decreased BY  29 BPM  Nonspecific T wave abnormality no longer evident in Inferior leads  QT has shortened        Results reviewed and no further action required.

## 2020-01-02 NOTE — PERIOP NOTES
PLEASE CONTINUE TAKING ALL PRESCRIPTION MEDICATIONS UP TO THE DAY OF SURGERY UNLESS OTHERWISE DIRECTED BELOW. DISCONTINUE all vitamins and supplements 7 days prior to surgery. DISCONTINUE Non-Steriodal Anti-Inflammatory (NSAIDS) such as Advil and Aleve 5 days prior to surgery. Home Medications to take  the day of surgery    Aspirin, Gabapentin, Metoprolol and Oxycodone if needed. Home Medications   to Hold   Vitamins, Supplements, and Herbals. Non-Steriodal Anti-Inflammatory (NSAIDS) such as Advil and Aleve. Plavix for 7 days. Comments   Take an 81 mg aspirin while holding Plavix. May stop aspirin once Plavix is restarted. Bring Nitro tablets to the hospital on the day of surgery. Please do not bring home medications with you on the day of surgery unless otherwise directed by your nurse. If you are instructed to bring home medications, please give them to your nurse as they will be administered by the nursing staff. If you have any questions, please call [facility] [phone] or [facility] [phone].

## 2020-01-02 NOTE — PERIOP NOTES
Patient verified name and     Order for consent found in EHR and matches case posting; patient verified. Type 3 surgery, assessment complete. Labs per surgeon: none  Labs per anesthesia protocol: CBC and BMP collected today; Type & Screen signed and held DOS  EK19 no changes since last EKG in [facility], acceptable per anesthesia protocol. Called placed to Dr. Steven Mera to update on length of surgery and co-morbidities. Anesthesia to assess [facility] approved surgical skin cleanser and instructions given per hospital policy. Patient provided with and instructed on educational handouts including Guide to Surgery, Pain Management, Hand Hygiene, Blood Transfusion Education, and [facility] Brochure. Patient answered medical/surgical history questions at their best of ability. All prior to admission medications documented in [facility]. Original medication prescription list verbalized during patient appointment. Patient instructed to hold all vitamins 7 days prior to surgery and NSAIDS 5 days prior to surgery, patient verbalized understanding. 19 cardiac clearance from Dr. Ginger Sweet to hold Plavix for 7 days (take 81mg aspirin while holding Plavix) in [facility]. Patient teach back successful and patient demonstrates knowledge of instructions.

## 2020-01-22 ENCOUNTER — ANESTHESIA EVENT (OUTPATIENT)
Dept: SURGERY | Age: 70
DRG: 254 | End: 2020-01-22
Payer: MEDICARE

## 2020-01-23 ENCOUNTER — ANESTHESIA (OUTPATIENT)
Dept: SURGERY | Age: 70
DRG: 254 | End: 2020-01-23
Payer: MEDICARE

## 2020-01-23 ENCOUNTER — HOSPITAL ENCOUNTER (INPATIENT)
Age: 70
LOS: 2 days | Discharge: HOME HEALTH | DRG: 254 | End: 2020-01-25
Attending: SURGERY | Admitting: SURGERY
Payer: MEDICARE

## 2020-01-23 ENCOUNTER — APPOINTMENT (OUTPATIENT)
Dept: ULTRASOUND IMAGING | Age: 70
DRG: 254 | End: 2020-01-23
Attending: SURGERY
Payer: MEDICARE

## 2020-01-23 PROBLEM — I73.9 PAD (PERIPHERAL ARTERY DISEASE) (HCC): Status: ACTIVE | Noted: 2020-01-23

## 2020-01-23 PROBLEM — I99.8 ISCHEMIA OF LEFT LOWER EXTREMITY: Status: ACTIVE | Noted: 2020-01-23

## 2020-01-23 LAB
ABO + RH BLD: NORMAL
ACT BLD: 153 SECS (ref 70–128)
ACT BLD: 274 SECS (ref 70–128)
ANION GAP SERPL CALC-SCNC: 5 MMOL/L (ref 7–16)
BLOOD GROUP ANTIBODIES SERPL: NORMAL
BUN SERPL-MCNC: 15 MG/DL (ref 8–23)
CALCIUM SERPL-MCNC: 8.6 MG/DL (ref 8.3–10.4)
CHLORIDE SERPL-SCNC: 107 MMOL/L (ref 98–107)
CO2 SERPL-SCNC: 27 MMOL/L (ref 21–32)
CREAT SERPL-MCNC: 1.37 MG/DL (ref 0.8–1.5)
ERYTHROCYTE [DISTWIDTH] IN BLOOD BY AUTOMATED COUNT: 15.1 % (ref 11.9–14.6)
ERYTHROCYTE [DISTWIDTH] IN BLOOD BY AUTOMATED COUNT: 15.3 % (ref 11.9–14.6)
GLUCOSE BLD STRIP.AUTO-MCNC: 155 MG/DL (ref 65–100)
GLUCOSE SERPL-MCNC: 138 MG/DL (ref 65–100)
HCT VFR BLD AUTO: 36.9 % (ref 41.1–50.3)
HCT VFR BLD AUTO: 42.9 % (ref 41.1–50.3)
HGB BLD-MCNC: 11.7 G/DL (ref 13.6–17.2)
HGB BLD-MCNC: 13.9 G/DL (ref 13.6–17.2)
MCH RBC QN AUTO: 28.3 PG (ref 26.1–32.9)
MCH RBC QN AUTO: 28.9 PG (ref 26.1–32.9)
MCHC RBC AUTO-ENTMCNC: 31.7 G/DL (ref 31.4–35)
MCHC RBC AUTO-ENTMCNC: 32.4 G/DL (ref 31.4–35)
MCV RBC AUTO: 89.2 FL (ref 79.6–97.8)
MCV RBC AUTO: 89.3 FL (ref 79.6–97.8)
NRBC # BLD: 0 K/UL (ref 0–0.2)
NRBC # BLD: 0 K/UL (ref 0–0.2)
PLATELET # BLD AUTO: 125 K/UL (ref 150–450)
PLATELET # BLD AUTO: 130 K/UL (ref 150–450)
PMV BLD AUTO: 12.6 FL (ref 9.4–12.3)
PMV BLD AUTO: 13.2 FL (ref 9.4–12.3)
POTASSIUM SERPL-SCNC: 4.8 MMOL/L (ref 3.5–5.1)
RBC # BLD AUTO: 4.13 M/UL (ref 4.23–5.6)
RBC # BLD AUTO: 4.81 M/UL (ref 4.23–5.6)
SODIUM SERPL-SCNC: 139 MMOL/L (ref 136–145)
SPECIMEN EXP DATE BLD: NORMAL
WBC # BLD AUTO: 7.9 K/UL (ref 4.3–11.1)
WBC # BLD AUTO: 8.6 K/UL (ref 4.3–11.1)

## 2020-01-23 PROCEDURE — 77030031639: Performed by: SURGERY

## 2020-01-23 PROCEDURE — 77030002996 HC SUT SLK J&J -A: Performed by: SURGERY

## 2020-01-23 PROCEDURE — 76210000006 HC OR PH I REC 0.5 TO 1 HR: Performed by: SURGERY

## 2020-01-23 PROCEDURE — 74011000250 HC RX REV CODE- 250: Performed by: NURSE ANESTHETIST, CERTIFIED REGISTERED

## 2020-01-23 PROCEDURE — 74011250636 HC RX REV CODE- 250/636: Performed by: NURSE ANESTHETIST, CERTIFIED REGISTERED

## 2020-01-23 PROCEDURE — 041N0JL BYPASS LEFT POPLITEAL ARTERY TO POPLITEAL ARTERY WITH SYNTHETIC SUBSTITUTE, OPEN APPROACH: ICD-10-PCS | Performed by: SURGERY

## 2020-01-23 PROCEDURE — 77030014008 HC SPNG HEMSTAT J&J -C: Performed by: SURGERY

## 2020-01-23 PROCEDURE — 77030008462 HC STPLR SKN PROX J&J -A: Performed by: SURGERY

## 2020-01-23 PROCEDURE — 4A133J1 MONITORING OF ARTERIAL PULSE, PERIPHERAL, PERCUTANEOUS APPROACH: ICD-10-PCS | Performed by: ANESTHESIOLOGY

## 2020-01-23 PROCEDURE — 77030031139 HC SUT VCRL2 J&J -A: Performed by: SURGERY

## 2020-01-23 PROCEDURE — 77030039425 HC BLD LARYNG TRULITE DISP TELE -A: Performed by: ANESTHESIOLOGY

## 2020-01-23 PROCEDURE — C1768 GRAFT, VASCULAR: HCPCS | Performed by: SURGERY

## 2020-01-23 PROCEDURE — 74011250637 HC RX REV CODE- 250/637: Performed by: SURGERY

## 2020-01-23 PROCEDURE — 77030010512 HC APPL CLP LIG J&J -C: Performed by: SURGERY

## 2020-01-23 PROCEDURE — 77030034850: Performed by: SURGERY

## 2020-01-23 PROCEDURE — 77030002933 HC SUT MCRYL J&J -A: Performed by: SURGERY

## 2020-01-23 PROCEDURE — 77030020263 HC SOL INJ SOD CL0.9% LFCR 1000ML

## 2020-01-23 PROCEDURE — 03HY32Z INSERTION OF MONITORING DEVICE INTO UPPER ARTERY, PERCUTANEOUS APPROACH: ICD-10-PCS | Performed by: ANESTHESIOLOGY

## 2020-01-23 PROCEDURE — 76060000038 HC ANESTHESIA 3.5 TO 4 HR: Performed by: SURGERY

## 2020-01-23 PROCEDURE — 4A133B1 MONITORING OF ARTERIAL PRESSURE, PERIPHERAL, PERCUTANEOUS APPROACH: ICD-10-PCS | Performed by: ANESTHESIOLOGY

## 2020-01-23 PROCEDURE — 77030013794 HC KT TRNSDUC BLD EDWD -B: Performed by: ANESTHESIOLOGY

## 2020-01-23 PROCEDURE — 82962 GLUCOSE BLOOD TEST: CPT

## 2020-01-23 PROCEDURE — 74011250636 HC RX REV CODE- 250/636: Performed by: SURGERY

## 2020-01-23 PROCEDURE — 77030018842 HC SOL IRR SOD CL 9% BAXT -A: Performed by: SURGERY

## 2020-01-23 PROCEDURE — 77030037088 HC TUBE ENDOTRACH ORAL NSL COVD-A: Performed by: ANESTHESIOLOGY

## 2020-01-23 PROCEDURE — 74011250636 HC RX REV CODE- 250/636: Performed by: ANESTHESIOLOGY

## 2020-01-23 PROCEDURE — 77030034888 HC SUT PROL 2 J&J -B: Performed by: SURGERY

## 2020-01-23 PROCEDURE — 77030019605

## 2020-01-23 PROCEDURE — 86900 BLOOD TYPING SEROLOGIC ABO: CPT

## 2020-01-23 PROCEDURE — 80048 BASIC METABOLIC PNL TOTAL CA: CPT

## 2020-01-23 PROCEDURE — 77030002987 HC SUT PROL J&J -B: Performed by: SURGERY

## 2020-01-23 PROCEDURE — 74011000250 HC RX REV CODE- 250: Performed by: SURGERY

## 2020-01-23 PROCEDURE — 77030005401 HC CATH RAD ARRO -A: Performed by: ANESTHESIOLOGY

## 2020-01-23 PROCEDURE — 74011250636 HC RX REV CODE- 250/636: Performed by: PHYSICIAN ASSISTANT

## 2020-01-23 PROCEDURE — 77030029354 HC BLD MINI RND RBSI -A: Performed by: SURGERY

## 2020-01-23 PROCEDURE — 77030019908 HC STETH ESOPH SIMS -A: Performed by: ANESTHESIOLOGY

## 2020-01-23 PROCEDURE — 77030013292 HC BOWL MX PRSM J&J -A: Performed by: ANESTHESIOLOGY

## 2020-01-23 PROCEDURE — 77030040922 HC BLNKT HYPOTHRM STRY -A: Performed by: ANESTHESIOLOGY

## 2020-01-23 PROCEDURE — 65610000001 HC ROOM ICU GENERAL

## 2020-01-23 PROCEDURE — 77030018673: Performed by: SURGERY

## 2020-01-23 PROCEDURE — 76010000174 HC OR TIME 3.5 TO 4 HR INTENSV-TIER 1: Performed by: SURGERY

## 2020-01-23 PROCEDURE — 77030018836 HC SOL IRR NACL ICUM -A: Performed by: SURGERY

## 2020-01-23 PROCEDURE — 85347 COAGULATION TIME ACTIVATED: CPT

## 2020-01-23 PROCEDURE — 85027 COMPLETE CBC AUTOMATED: CPT

## 2020-01-23 DEVICE — GRAFT VASC L50CM DIA6MM PTFE CBAS HEP SURF THN WALLED REM: Type: IMPLANTABLE DEVICE | Site: LEG | Status: FUNCTIONAL

## 2020-01-23 RX ORDER — SODIUM CHLORIDE, SODIUM LACTATE, POTASSIUM CHLORIDE, CALCIUM CHLORIDE 600; 310; 30; 20 MG/100ML; MG/100ML; MG/100ML; MG/100ML
75 INJECTION, SOLUTION INTRAVENOUS CONTINUOUS
Status: DISCONTINUED | OUTPATIENT
Start: 2020-01-23 | End: 2020-01-23 | Stop reason: HOSPADM

## 2020-01-23 RX ORDER — HEPARIN SODIUM 1000 [USP'U]/ML
INJECTION, SOLUTION INTRAVENOUS; SUBCUTANEOUS AS NEEDED
Status: DISCONTINUED | OUTPATIENT
Start: 2020-01-23 | End: 2020-01-23 | Stop reason: HOSPADM

## 2020-01-23 RX ORDER — PROTAMINE SULFATE 10 MG/ML
INJECTION, SOLUTION INTRAVENOUS AS NEEDED
Status: DISCONTINUED | OUTPATIENT
Start: 2020-01-23 | End: 2020-01-23 | Stop reason: HOSPADM

## 2020-01-23 RX ORDER — METOPROLOL SUCCINATE 50 MG/1
50 TABLET, EXTENDED RELEASE ORAL DAILY
Status: DISCONTINUED | OUTPATIENT
Start: 2020-01-24 | End: 2020-01-25 | Stop reason: HOSPADM

## 2020-01-23 RX ORDER — SODIUM CHLORIDE, SODIUM LACTATE, POTASSIUM CHLORIDE, CALCIUM CHLORIDE 600; 310; 30; 20 MG/100ML; MG/100ML; MG/100ML; MG/100ML
50 INJECTION, SOLUTION INTRAVENOUS CONTINUOUS
Status: DISCONTINUED | OUTPATIENT
Start: 2020-01-23 | End: 2020-01-23 | Stop reason: HOSPADM

## 2020-01-23 RX ORDER — HYDROCODONE BITARTRATE AND ACETAMINOPHEN 5; 325 MG/1; MG/1
1 TABLET ORAL
Status: DISCONTINUED | OUTPATIENT
Start: 2020-01-23 | End: 2020-01-23

## 2020-01-23 RX ORDER — HYDROMORPHONE HYDROCHLORIDE 2 MG/ML
0.5 INJECTION, SOLUTION INTRAMUSCULAR; INTRAVENOUS; SUBCUTANEOUS
Status: DISCONTINUED | OUTPATIENT
Start: 2020-01-23 | End: 2020-01-23 | Stop reason: HOSPADM

## 2020-01-23 RX ORDER — HEPARIN SODIUM 5000 [USP'U]/ML
INJECTION, SOLUTION INTRAVENOUS; SUBCUTANEOUS AS NEEDED
Status: DISCONTINUED | OUTPATIENT
Start: 2020-01-23 | End: 2020-01-23 | Stop reason: HOSPADM

## 2020-01-23 RX ORDER — ASPIRIN 81 MG/1
81 TABLET ORAL DAILY
Status: DISCONTINUED | OUTPATIENT
Start: 2020-01-24 | End: 2020-01-25 | Stop reason: HOSPADM

## 2020-01-23 RX ORDER — LIDOCAINE HYDROCHLORIDE 10 MG/ML
0.1 INJECTION INFILTRATION; PERINEURAL AS NEEDED
Status: DISCONTINUED | OUTPATIENT
Start: 2020-01-23 | End: 2020-01-23 | Stop reason: HOSPADM

## 2020-01-23 RX ORDER — SODIUM CHLORIDE 0.9 % (FLUSH) 0.9 %
5-40 SYRINGE (ML) INJECTION EVERY 8 HOURS
Status: DISCONTINUED | OUTPATIENT
Start: 2020-01-23 | End: 2020-01-25 | Stop reason: HOSPADM

## 2020-01-23 RX ORDER — SODIUM CHLORIDE 0.9 % (FLUSH) 0.9 %
5-40 SYRINGE (ML) INJECTION AS NEEDED
Status: DISCONTINUED | OUTPATIENT
Start: 2020-01-23 | End: 2020-01-25 | Stop reason: HOSPADM

## 2020-01-23 RX ORDER — BACITRACIN 50000 [IU]/1
INJECTION, POWDER, FOR SOLUTION INTRAMUSCULAR AS NEEDED
Status: DISCONTINUED | OUTPATIENT
Start: 2020-01-23 | End: 2020-01-23 | Stop reason: HOSPADM

## 2020-01-23 RX ORDER — OXYCODONE HYDROCHLORIDE 5 MG/1
5 TABLET ORAL
Status: DISCONTINUED | OUTPATIENT
Start: 2020-01-23 | End: 2020-01-23 | Stop reason: HOSPADM

## 2020-01-23 RX ORDER — ONDANSETRON 2 MG/ML
INJECTION INTRAMUSCULAR; INTRAVENOUS AS NEEDED
Status: DISCONTINUED | OUTPATIENT
Start: 2020-01-23 | End: 2020-01-23 | Stop reason: HOSPADM

## 2020-01-23 RX ORDER — LISINOPRIL AND HYDROCHLOROTHIAZIDE 20; 25 MG/1; MG/1
1 TABLET ORAL DAILY
Status: DISCONTINUED | OUTPATIENT
Start: 2020-01-24 | End: 2020-01-25 | Stop reason: HOSPADM

## 2020-01-23 RX ORDER — SODIUM CHLORIDE 0.9 % (FLUSH) 0.9 %
5-40 SYRINGE (ML) INJECTION AS NEEDED
Status: DISCONTINUED | OUTPATIENT
Start: 2020-01-23 | End: 2020-01-23 | Stop reason: HOSPADM

## 2020-01-23 RX ORDER — MORPHINE SULFATE 2 MG/ML
2 INJECTION, SOLUTION INTRAMUSCULAR; INTRAVENOUS
Status: DISCONTINUED | OUTPATIENT
Start: 2020-01-23 | End: 2020-01-23

## 2020-01-23 RX ORDER — ONDANSETRON 2 MG/ML
4 INJECTION INTRAMUSCULAR; INTRAVENOUS
Status: DISCONTINUED | OUTPATIENT
Start: 2020-01-23 | End: 2020-01-25 | Stop reason: HOSPADM

## 2020-01-23 RX ORDER — ROCURONIUM BROMIDE 10 MG/ML
INJECTION, SOLUTION INTRAVENOUS AS NEEDED
Status: DISCONTINUED | OUTPATIENT
Start: 2020-01-23 | End: 2020-01-23 | Stop reason: HOSPADM

## 2020-01-23 RX ORDER — HYDROMORPHONE HYDROCHLORIDE 1 MG/ML
0.5 INJECTION, SOLUTION INTRAMUSCULAR; INTRAVENOUS; SUBCUTANEOUS
Status: DISCONTINUED | OUTPATIENT
Start: 2020-01-23 | End: 2020-01-25 | Stop reason: HOSPADM

## 2020-01-23 RX ORDER — ACETAMINOPHEN 10 MG/ML
INJECTION, SOLUTION INTRAVENOUS AS NEEDED
Status: DISCONTINUED | OUTPATIENT
Start: 2020-01-23 | End: 2020-01-23 | Stop reason: HOSPADM

## 2020-01-23 RX ORDER — CEFAZOLIN SODIUM/WATER 2 G/20 ML
2 SYRINGE (ML) INTRAVENOUS EVERY 8 HOURS
Status: COMPLETED | OUTPATIENT
Start: 2020-01-23 | End: 2020-01-23

## 2020-01-23 RX ORDER — FENTANYL CITRATE 50 UG/ML
100 INJECTION, SOLUTION INTRAMUSCULAR; INTRAVENOUS ONCE
Status: DISCONTINUED | OUTPATIENT
Start: 2020-01-23 | End: 2020-01-23 | Stop reason: HOSPADM

## 2020-01-23 RX ORDER — FLUCONAZOLE 2 MG/ML
200 INJECTION, SOLUTION INTRAVENOUS ONCE
Status: COMPLETED | OUTPATIENT
Start: 2020-01-23 | End: 2020-01-23

## 2020-01-23 RX ORDER — SODIUM CHLORIDE 9 MG/ML
75 INJECTION, SOLUTION INTRAVENOUS CONTINUOUS
Status: DISCONTINUED | OUTPATIENT
Start: 2020-01-23 | End: 2020-01-24

## 2020-01-23 RX ORDER — SODIUM CHLORIDE 0.9 % (FLUSH) 0.9 %
5-40 SYRINGE (ML) INJECTION EVERY 8 HOURS
Status: DISCONTINUED | OUTPATIENT
Start: 2020-01-23 | End: 2020-01-23 | Stop reason: HOSPADM

## 2020-01-23 RX ORDER — ALBUTEROL SULFATE 0.83 MG/ML
2.5 SOLUTION RESPIRATORY (INHALATION) AS NEEDED
Status: DISCONTINUED | OUTPATIENT
Start: 2020-01-23 | End: 2020-01-23 | Stop reason: HOSPADM

## 2020-01-23 RX ORDER — METFORMIN HYDROCHLORIDE 500 MG/1
1000 TABLET ORAL 2 TIMES DAILY WITH MEALS
Status: DISCONTINUED | OUTPATIENT
Start: 2020-01-23 | End: 2020-01-25 | Stop reason: HOSPADM

## 2020-01-23 RX ORDER — MIDAZOLAM HYDROCHLORIDE 1 MG/ML
2 INJECTION, SOLUTION INTRAMUSCULAR; INTRAVENOUS
Status: DISCONTINUED | OUTPATIENT
Start: 2020-01-23 | End: 2020-01-23 | Stop reason: HOSPADM

## 2020-01-23 RX ORDER — LIDOCAINE HYDROCHLORIDE 20 MG/ML
INJECTION, SOLUTION EPIDURAL; INFILTRATION; INTRACAUDAL; PERINEURAL AS NEEDED
Status: DISCONTINUED | OUTPATIENT
Start: 2020-01-23 | End: 2020-01-23 | Stop reason: HOSPADM

## 2020-01-23 RX ORDER — PAPAVERINE HYDROCHLORIDE 30 MG/ML
INJECTION INTRAMUSCULAR; INTRAVENOUS AS NEEDED
Status: DISCONTINUED | OUTPATIENT
Start: 2020-01-23 | End: 2020-01-23 | Stop reason: HOSPADM

## 2020-01-23 RX ORDER — VECURONIUM BROMIDE FOR INJECTION 1 MG/ML
INJECTION, POWDER, LYOPHILIZED, FOR SOLUTION INTRAVENOUS AS NEEDED
Status: DISCONTINUED | OUTPATIENT
Start: 2020-01-23 | End: 2020-01-23 | Stop reason: HOSPADM

## 2020-01-23 RX ORDER — MIDAZOLAM HYDROCHLORIDE 1 MG/ML
2 INJECTION, SOLUTION INTRAMUSCULAR; INTRAVENOUS ONCE
Status: DISCONTINUED | OUTPATIENT
Start: 2020-01-23 | End: 2020-01-23 | Stop reason: HOSPADM

## 2020-01-23 RX ORDER — PROPOFOL 10 MG/ML
INJECTION, EMULSION INTRAVENOUS AS NEEDED
Status: DISCONTINUED | OUTPATIENT
Start: 2020-01-23 | End: 2020-01-23 | Stop reason: HOSPADM

## 2020-01-23 RX ORDER — EPHEDRINE SULFATE 50 MG/ML
INJECTION, SOLUTION INTRAVENOUS AS NEEDED
Status: DISCONTINUED | OUTPATIENT
Start: 2020-01-23 | End: 2020-01-23 | Stop reason: HOSPADM

## 2020-01-23 RX ORDER — OXYCODONE HYDROCHLORIDE 15 MG/1
30 TABLET ORAL
Status: DISCONTINUED | OUTPATIENT
Start: 2020-01-23 | End: 2020-01-25 | Stop reason: HOSPADM

## 2020-01-23 RX ORDER — CEFAZOLIN SODIUM/WATER 2 G/20 ML
2 SYRINGE (ML) INTRAVENOUS ONCE
Status: COMPLETED | OUTPATIENT
Start: 2020-01-23 | End: 2020-01-23

## 2020-01-23 RX ORDER — FENTANYL CITRATE 50 UG/ML
INJECTION, SOLUTION INTRAMUSCULAR; INTRAVENOUS AS NEEDED
Status: DISCONTINUED | OUTPATIENT
Start: 2020-01-23 | End: 2020-01-23 | Stop reason: HOSPADM

## 2020-01-23 RX ADMIN — HEPARIN SODIUM 9000 UNITS: 1000 INJECTION INTRAVENOUS; SUBCUTANEOUS at 09:14

## 2020-01-23 RX ADMIN — PROTAMINE SULFATE 10 MG: 10 INJECTION, SOLUTION INTRAVENOUS at 11:03

## 2020-01-23 RX ADMIN — Medication 2 G: at 21:42

## 2020-01-23 RX ADMIN — OXYCODONE HYDROCHLORIDE 30 MG: 15 TABLET ORAL at 20:59

## 2020-01-23 RX ADMIN — SODIUM CHLORIDE, SODIUM LACTATE, POTASSIUM CHLORIDE, AND CALCIUM CHLORIDE 75 ML/HR: 600; 310; 30; 20 INJECTION, SOLUTION INTRAVENOUS at 06:27

## 2020-01-23 RX ADMIN — PROTAMINE SULFATE 10 MG: 10 INJECTION, SOLUTION INTRAVENOUS at 10:44

## 2020-01-23 RX ADMIN — PROPOFOL 150 MG: 10 INJECTION, EMULSION INTRAVENOUS at 07:58

## 2020-01-23 RX ADMIN — PHENYLEPHRINE HYDROCHLORIDE 120 MCG: 10 INJECTION INTRAVENOUS at 08:20

## 2020-01-23 RX ADMIN — PHENYLEPHRINE HYDROCHLORIDE 100 MCG: 10 INJECTION INTRAVENOUS at 07:58

## 2020-01-23 RX ADMIN — EPHEDRINE SULFATE 10 MG: 50 INJECTION, SOLUTION INTRAVENOUS at 08:55

## 2020-01-23 RX ADMIN — EPHEDRINE SULFATE 10 MG: 50 INJECTION, SOLUTION INTRAVENOUS at 08:38

## 2020-01-23 RX ADMIN — PROTAMINE SULFATE 10 MG: 10 INJECTION, SOLUTION INTRAVENOUS at 10:43

## 2020-01-23 RX ADMIN — SODIUM CHLORIDE, SODIUM LACTATE, POTASSIUM CHLORIDE, AND CALCIUM CHLORIDE: 600; 310; 30; 20 INJECTION, SOLUTION INTRAVENOUS at 10:45

## 2020-01-23 RX ADMIN — VECURONIUM BROMIDE 2 MG: 1 INJECTION, POWDER, LYOPHILIZED, FOR SOLUTION INTRAVENOUS at 08:25

## 2020-01-23 RX ADMIN — FENTANYL CITRATE 50 MCG: 50 INJECTION INTRAMUSCULAR; INTRAVENOUS at 07:58

## 2020-01-23 RX ADMIN — ROCURONIUM BROMIDE 50 MG: 10 INJECTION, SOLUTION INTRAVENOUS at 07:59

## 2020-01-23 RX ADMIN — PHENYLEPHRINE HYDROCHLORIDE 120 MCG: 10 INJECTION INTRAVENOUS at 08:55

## 2020-01-23 RX ADMIN — ACETAMINOPHEN 1000 MG: 10 INJECTION, SOLUTION INTRAVENOUS at 10:47

## 2020-01-23 RX ADMIN — Medication 2 G: at 08:06

## 2020-01-23 RX ADMIN — HEPARIN SODIUM 4500 UNITS: 1000 INJECTION INTRAVENOUS; SUBCUTANEOUS at 10:05

## 2020-01-23 RX ADMIN — Medication 10 ML: at 21:00

## 2020-01-23 RX ADMIN — FENTANYL CITRATE 25 MCG: 50 INJECTION INTRAMUSCULAR; INTRAVENOUS at 11:12

## 2020-01-23 RX ADMIN — HYDROMORPHONE HYDROCHLORIDE 0.5 MG: 1 INJECTION, SOLUTION INTRAMUSCULAR; INTRAVENOUS; SUBCUTANEOUS at 14:49

## 2020-01-23 RX ADMIN — PHENYLEPHRINE HYDROCHLORIDE 120 MCG: 10 INJECTION INTRAVENOUS at 08:36

## 2020-01-23 RX ADMIN — EPHEDRINE SULFATE 10 MG: 50 INJECTION, SOLUTION INTRAVENOUS at 10:19

## 2020-01-23 RX ADMIN — PHENYLEPHRINE HYDROCHLORIDE 30 MCG/MIN: 10 INJECTION INTRAVENOUS at 08:59

## 2020-01-23 RX ADMIN — FENTANYL CITRATE 25 MCG: 50 INJECTION INTRAMUSCULAR; INTRAVENOUS at 10:43

## 2020-01-23 RX ADMIN — PHENYLEPHRINE HYDROCHLORIDE 150 MCG: 10 INJECTION INTRAVENOUS at 08:07

## 2020-01-23 RX ADMIN — PHENYLEPHRINE HYDROCHLORIDE 120 MCG: 10 INJECTION INTRAVENOUS at 09:11

## 2020-01-23 RX ADMIN — FENTANYL CITRATE 25 MCG: 50 INJECTION INTRAMUSCULAR; INTRAVENOUS at 08:29

## 2020-01-23 RX ADMIN — Medication 2 G: at 16:33

## 2020-01-23 RX ADMIN — VECURONIUM BROMIDE 2 MG: 1 INJECTION, POWDER, LYOPHILIZED, FOR SOLUTION INTRAVENOUS at 08:46

## 2020-01-23 RX ADMIN — Medication 10 ML: at 14:17

## 2020-01-23 RX ADMIN — VECURONIUM BROMIDE 2 MG: 1 INJECTION, POWDER, LYOPHILIZED, FOR SOLUTION INTRAVENOUS at 09:06

## 2020-01-23 RX ADMIN — METFORMIN HYDROCHLORIDE 1000 MG: 500 TABLET ORAL at 17:41

## 2020-01-23 RX ADMIN — FENTANYL CITRATE 25 MCG: 50 INJECTION INTRAMUSCULAR; INTRAVENOUS at 11:31

## 2020-01-23 RX ADMIN — FLUCONAZOLE 200 MG: 200 INJECTION, SOLUTION INTRAVENOUS at 08:49

## 2020-01-23 RX ADMIN — PROTAMINE SULFATE 10 MG: 10 INJECTION, SOLUTION INTRAVENOUS at 10:41

## 2020-01-23 RX ADMIN — FENTANYL CITRATE 50 MCG: 50 INJECTION INTRAMUSCULAR; INTRAVENOUS at 07:55

## 2020-01-23 RX ADMIN — OXYCODONE HYDROCHLORIDE 30 MG: 15 TABLET ORAL at 15:29

## 2020-01-23 RX ADMIN — ONDANSETRON 4 MG: 2 INJECTION INTRAMUSCULAR; INTRAVENOUS at 10:39

## 2020-01-23 RX ADMIN — PHENYLEPHRINE HYDROCHLORIDE 60 MCG: 10 INJECTION INTRAVENOUS at 08:34

## 2020-01-23 RX ADMIN — LIDOCAINE HYDROCHLORIDE 60 MG: 20 INJECTION, SOLUTION EPIDURAL; INFILTRATION; INTRACAUDAL; PERINEURAL at 07:58

## 2020-01-23 RX ADMIN — PROTAMINE SULFATE 10 MG: 10 INJECTION, SOLUTION INTRAVENOUS at 10:42

## 2020-01-23 RX ADMIN — PROTAMINE SULFATE 10 MG: 10 INJECTION, SOLUTION INTRAVENOUS at 10:38

## 2020-01-23 NOTE — PROGRESS NOTES
Pt seen in ICU s/p surgery with Dr. Teresa Forrest. Spoke with wife, Master Palomino at bedside, as pt resting in bed. Confirms demographics. No needs currently voiced for d/c. Wife aware CM will follow for any d/c needs per MD.     Care Management Interventions  PCP Verified by CM: Yes(confirms Dr. Hao Diamond)  Mode of Transport at Discharge:  Other (see comment)  Transition of Care Consult (CM Consult): Discharge Planning  Discharge Durable Medical Equipment: (glucometer, cane)  Current Support Network: Lives with Spouse(lives with spouse, Master Palomino -[phone])  Confirm Follow Up Transport: [address] Provided?: (confirms [facility]  Discharge Location  Discharge Placement: Unable to determine at this time

## 2020-01-23 NOTE — ANESTHESIA POSTPROCEDURE EVALUATION
Procedure(s):  LEFT SUPERFICIAL ARTERY TO BELOW KNEE POPLITEAL ARTERY BYPASS WITH GRAFT. Torito Ko general    Anesthesia Post Evaluation      Multimodal analgesia: multimodal analgesia used between 6 hours prior to anesthesia start to PACU discharge  Patient location during evaluation: PACU  Patient participation: complete - patient participated  Level of consciousness: responsive to verbal stimuli  Pain management: adequate  Airway patency: patent  Anesthetic complications: no  Cardiovascular status: acceptable  Respiratory status: acceptable, spontaneous ventilation and nonlabored ventilation  Hydration status: acceptable  Post anesthesia nausea and vomiting:  none      Vitals Value Taken Time   BP 97/59 1/23/2020 12:44 PM   Temp 37.2 °C (98.9 °F) 1/23/2020 11:35 AM   Pulse 58 1/23/2020 12:45 PM   Resp 16 1/23/2020 12:33 PM   SpO2 100 % 1/23/2020 12:45 PM   Vitals shown include unvalidated device data.

## 2020-01-23 NOTE — BRIEF OP NOTE
[address] [address]  [phone] FAX: [phone]    Brief Op Note Template Note    Pre-Op Diagnosis: PAD (peripheral artery disease) ([facility] [address] [I73.9]    Post-Op Diagnosis:  PAD (peripheral artery disease) ([facility] [address] [I73.9]    Procedures: Procedure(s):  LEFT SUPERFICIAL ARTERY TO BELOW KNEE POPLITEAL ARTERY BYPASS WITH GRAFT. Surgeon: Gela Sebastian MD    Assistants: Surgeon(s): Collette Andrews MD      Anesthesia:  General     Findings: Left popliteal artery exposed occluded left SFA to posterior tibial artery bypass with PTFE graft    Tourniquet Time:  * No tourniquets in log *    Estimated Blood Loss:               Specimens:            Implants:    Implant Name Type Inv. Item Serial No.  Lot No. LRB No. Used Action   GRAFT TW STRTCH RR [id number] -- PROPAT[employer] - [id number]  GRAFT TW STRTCH RR [id number] -- Elie Foots [id number]  [employer]  Left 1 Implanted       Complications: None               Signed: Gela Sebastian MD      Elements of this note have been dictated using speech recognition software. As a result, errors of speech recognition may have occurred.

## 2020-01-23 NOTE — PERIOP NOTES
TRANSFER - OUT REPORT:    Verbal report given to [facility] on Graciella Risk  being transferred to 310 for routine post - op       Report consisted of patients Situation, Background, Assessment and   Recommendations(SBAR). Information from the following report(s) SBAR, OR Summary, Procedure Summary, Intake/Output and MAR was reviewed with the receiving nurse. Lines:   Peripheral IV 01/23/20 Left Wrist (Active)   Site Assessment Clean, dry, & intact 1/23/2020 12:58 PM   Phlebitis Assessment 0 1/23/2020 12:58 PM   Infiltration Assessment 0 1/23/2020 12:58 PM   Dressing Status Clean, dry, & intact 1/23/2020 12:58 PM   Dressing Type Tape;Transparent 1/23/2020 12:58 PM   Hub Color/Line Status Pink; Infusing 1/23/2020 12:58 PM   Alcohol Cap Used No 1/23/2020 12:58 PM       Peripheral IV 01/23/20 Right Wrist (Active)   Site Assessment Clean, dry, & intact 1/23/2020 12:58 PM   Phlebitis Assessment 0 1/23/2020 12:58 PM   Infiltration Assessment 0 1/23/2020 12:58 PM   Dressing Status Clean, dry, & intact 1/23/2020 12:58 PM   Dressing Type Tape;Transparent 1/23/2020 12:58 PM       Arterial Line 01/23/20 Left Radial artery (Active)   Site Assessment Clean, dry, & intact 1/23/2020 12:58 PM   Dressing Status Clean, dry, & intact 1/23/2020 12:58 PM   Dressing Type Tape;Transparent 1/23/2020 12:58 PM   Line Status Intact and in place 1/23/2020 12:58 PM   Treatment Zeroed or re-zeroed; Arm board off 1/23/2020 11:34 AM        Opportunity for questions and clarification was provided. Patient transported with:   Monitor  O2 @ 2 liters  Registered Nurse    VTE prophylaxis orders have not been written for Graciella Risk. Patient and family given floor number and nurses name. Family updated re: pt status after security code verified.

## 2020-01-23 NOTE — PROGRESS NOTES
TRANSFER - IN REPORT:    Verbal report received from Abiola RN(name) on Amie Del Valle  being received from [facility]) for routine post - op      Report consisted of patients Situation, Background, Assessment and   Recommendations(SBAR). Information from the following report(s) SBAR, Kardex, Procedure Summary, Intake/Output, MAR, Accordion and Recent Results was reviewed with the receiving nurse. Opportunity for questions and clarification was provided. Assessment completed upon patients arrival to unit and care assumed. Dual skin assessment complete with Sophia Walker. Left leg incision dressing CDI. Scattered psoriasis patches over body. Patient states he does not use any cream for them because they have not worked.

## 2020-01-23 NOTE — ANESTHESIA PREPROCEDURE EVALUATION
Relevant Problems   No relevant active problems       Anesthetic History   No history of anesthetic complications            Review of Systems / Medical History  Patient summary reviewed, nursing notes reviewed and pertinent labs reviewed    Pulmonary          Smoker    Pertinent negatives: No COPD (pt denies)     Neuro/Psych   Within defined limits           Cardiovascular    Hypertension: well controlled          CAD, PAD (s/p EVAR 2013), cardiac stents (most recent 2008) and hyperlipidemia    Exercise tolerance: <4 METS: Limited by neuropathy and claudication  Comments: TTE 2019- EF 55-60%, LVH, mild LAE, normal diastolic fxn, AV sclerosis without stenosis   GI/Hepatic/Renal  Within defined limits              Endo/Other    Diabetes: type 2         Other Findings   Comments:  Thrombocytopenia  neuropathy         Physical Exam    Airway  Mallampati: III      Mouth opening: Normal     Cardiovascular  Regular rate and rhythm,  S1 and S2 normal,  no murmur, click, rub, or gallop             Dental    Dentition: Edentulous     Pulmonary  Breath sounds clear to auscultation               Abdominal         Other Findings            Anesthetic Plan    ASA: 3  Anesthesia type: general    Monitoring Plan: Arterial line      Induction: Intravenous  Anesthetic plan and risks discussed with: Patient and Spouse

## 2020-01-23 NOTE — H&P
[address]   [address] [facility] [address] FAX: [phone]         VASCULAR SURGERY FLOOR PROGRESS NOTE    Admit Date: 2020  POD: Day of Surgery    Procedure:  Procedure(s):  LEFT SUPERFICIAL ARTERY TO BELOW KNEE POPLITEAL ARTERY BYPASS WITH IPSILATERAL VEIN    Subjective:     Patient has no new complaints. Objective:     Vitals:  Blood pressure 147/75, pulse 62, temperature 98.5 °F (36.9 °C), resp. rate 18, height 6' 3\" (1.905 m), weight 214 lb 4 oz (97.2 kg), SpO2 95 %. Temp (24hrs), Av.5 °F (36.9 °C), Min:98.5 °F (36.9 °C), Max:98.5 °F (36.9 °C)      Intake / Output:  No intake or output data in the 24 hours ending 20 0659    Physical Exam:    Constitutional: he appears well-developed. No distress. HENT:   Head: Atraumatic. Eyes: Pupils are equal, round, and reactive to light. Neck: Normal range of motion. Cardiovascular: Regular rhythm. Pulmonary/Chest: Effort normal and breath sounds normal. No respiratory distress. Abdominal: Soft. Bowel sounds are normal. he exhibits no distension. There is no tenderness. There is no guarding. No hernia. Musculoskeletal: Normal range of motion. Neurological: He is alert. CN II- XII grossly intact  Vascular: dopperable PT    Labs: No results for input(s): HGB, WBC, K, GLU, HGBEXT in the last 72 hours.     No lab exists for component:  CREA    Data Review     Assessment:     Patient Active Problem List    Diagnosis Date Noted    Skin ulcer of toe of right foot with fat layer exposed ([facility] [address] 2019    Sensory motor neuropathy 2018    Type 2 diabetes with nephropathy ([facility] [address] 2018    Type 2 diabetes mellitus with diabetic neuropathy ([facility] [address] 2018    Polyneuropathy associated with underlying disease ([facility] [address] 2017    Iron deficiency anemia 2017    Mixed hyperlipidemia 2016    Psoriasis 2016    Edema 2016    Ischemic cardiomyopathy 2016    Anemia, unspecified  08/26/2016    COPD (chronic obstructive pulmonary disease) ([facility] [address] 02/16/2016    Diabetes mellitus type 2, controlled ([facility] [address] 02/10/2016    MSSA (methicillin susceptible Staphylococcus aureus) [facility] 02/07/2016    Chronic pain 02/05/2016    Actinic keratosis 10/19/2015    Peripheral neuropathy 06/25/2015    Essential hypertension with goal blood pressure less than 140/90 06/22/2015    Tobacco abuse 04/21/2015    CAD (coronary artery disease) 04/20/2015    PVD (peripheral vascular disease) ([facility] 04/20/2015    Popliteal artery aneurysm, bilateral ([facility] 06/23/2014    Abdominal aortic aneurysm ([facility] 10/15/2013    Current smoker 10/15/2013       Plan/Recommendations/Medical Decision Making:     Plan for left femoral below knee pop bypass    Elements of this note have been dictated using speech recognition software. As a result, errors of speech recognition may have occurred.

## 2020-01-23 NOTE — ANESTHESIA PROCEDURE NOTES
Arterial Line Placement    Start time: 1/23/2020 7:59 AM  End time: 1/23/2020 8:05 AM  Performed by: Tracy Alston MD  Authorized by: Tracy Alston MD     Pre-Procedure  Indications:  Arterial pressure monitoring and blood sampling  Preanesthetic Checklist: patient identified, risks and benefits discussed, anesthesia consent, patient being monitored and patient being monitored      Procedure:   Prep:  ChloraPrep  Seldinger Technique?: Yes    Orientation:  Left  Location:  Radial artery  Catheter size:  20 G  Number of attempts:  1  Cont Cardiac Output Sensor: No      Assessment:   Post-procedure:  Line secured and sterile dressing applied  Patient Tolerance:  Patient tolerated the procedure well with no immediate complications

## 2020-01-23 NOTE — PROGRESS NOTES
Spiritual Care visit. Initial Visit, Pre Surgery Consult. Visit and prayer before patient goes to surgery.     Visit by Jenaro Olmedo M.Ed., Th.B. ,Staff

## 2020-01-24 LAB
ANION GAP SERPL CALC-SCNC: 5 MMOL/L (ref 7–16)
BUN SERPL-MCNC: 16 MG/DL (ref 8–23)
CALCIUM SERPL-MCNC: 8.2 MG/DL (ref 8.3–10.4)
CHLORIDE SERPL-SCNC: 107 MMOL/L (ref 98–107)
CO2 SERPL-SCNC: 26 MMOL/L (ref 21–32)
CREAT SERPL-MCNC: 1.29 MG/DL (ref 0.8–1.5)
ERYTHROCYTE [DISTWIDTH] IN BLOOD BY AUTOMATED COUNT: 15.2 % (ref 11.9–14.6)
GLUCOSE BLD STRIP.AUTO-MCNC: 169 MG/DL (ref 65–100)
GLUCOSE SERPL-MCNC: 239 MG/DL (ref 65–100)
HCT VFR BLD AUTO: 33.7 % (ref 41.1–50.3)
HGB BLD-MCNC: 10.8 G/DL (ref 13.6–17.2)
MCH RBC QN AUTO: 28.7 PG (ref 26.1–32.9)
MCHC RBC AUTO-ENTMCNC: 32 G/DL (ref 31.4–35)
MCV RBC AUTO: 89.6 FL (ref 79.6–97.8)
NRBC # BLD: 0 K/UL (ref 0–0.2)
PLATELET # BLD AUTO: 117 K/UL (ref 150–450)
PMV BLD AUTO: 12.6 FL (ref 9.4–12.3)
POTASSIUM SERPL-SCNC: 4.7 MMOL/L (ref 3.5–5.1)
RBC # BLD AUTO: 3.76 M/UL (ref 4.23–5.6)
SODIUM SERPL-SCNC: 138 MMOL/L (ref 136–145)
WBC # BLD AUTO: 7.6 K/UL (ref 4.3–11.1)

## 2020-01-24 PROCEDURE — 97162 PT EVAL MOD COMPLEX 30 MIN: CPT

## 2020-01-24 PROCEDURE — 97110 THERAPEUTIC EXERCISES: CPT

## 2020-01-24 PROCEDURE — 77030020263 HC SOL INJ SOD CL0.9% LFCR 1000ML

## 2020-01-24 PROCEDURE — 74011250637 HC RX REV CODE- 250/637: Performed by: SURGERY

## 2020-01-24 PROCEDURE — 65270000029 HC RM PRIVATE

## 2020-01-24 PROCEDURE — 36592 COLLECT BLOOD FROM PICC: CPT

## 2020-01-24 PROCEDURE — 85027 COMPLETE CBC AUTOMATED: CPT

## 2020-01-24 PROCEDURE — 74011250636 HC RX REV CODE- 250/636: Performed by: SURGERY

## 2020-01-24 PROCEDURE — 82962 GLUCOSE BLOOD TEST: CPT

## 2020-01-24 PROCEDURE — 80048 BASIC METABOLIC PNL TOTAL CA: CPT

## 2020-01-24 RX ORDER — HEPARIN SODIUM 5000 [USP'U]/ML
5000 INJECTION, SOLUTION INTRAVENOUS; SUBCUTANEOUS EVERY 8 HOURS
Status: DISCONTINUED | OUTPATIENT
Start: 2020-01-24 | End: 2020-01-25 | Stop reason: HOSPADM

## 2020-01-24 RX ADMIN — HYDROMORPHONE HYDROCHLORIDE 0.5 MG: 1 INJECTION, SOLUTION INTRAMUSCULAR; INTRAVENOUS; SUBCUTANEOUS at 20:11

## 2020-01-24 RX ADMIN — OXYCODONE HYDROCHLORIDE 30 MG: 15 TABLET ORAL at 03:43

## 2020-01-24 RX ADMIN — METFORMIN HYDROCHLORIDE 1000 MG: 500 TABLET ORAL at 07:30

## 2020-01-24 RX ADMIN — HEPARIN SODIUM 5000 UNITS: 5000 INJECTION INTRAVENOUS; SUBCUTANEOUS at 16:53

## 2020-01-24 RX ADMIN — SODIUM CHLORIDE 75 ML/HR: 900 INJECTION, SOLUTION INTRAVENOUS at 05:10

## 2020-01-24 RX ADMIN — OXYCODONE HYDROCHLORIDE 30 MG: 15 TABLET ORAL at 13:38

## 2020-01-24 RX ADMIN — ASPIRIN 81 MG: 81 TABLET ORAL at 08:42

## 2020-01-24 RX ADMIN — HYDROMORPHONE HYDROCHLORIDE 0.5 MG: 1 INJECTION, SOLUTION INTRAMUSCULAR; INTRAVENOUS; SUBCUTANEOUS at 14:47

## 2020-01-24 RX ADMIN — METFORMIN HYDROCHLORIDE 1000 MG: 500 TABLET ORAL at 16:54

## 2020-01-24 RX ADMIN — OXYCODONE HYDROCHLORIDE 30 MG: 15 TABLET ORAL at 17:11

## 2020-01-24 RX ADMIN — METOPROLOL SUCCINATE 50 MG: 50 TABLET, EXTENDED RELEASE ORAL at 08:42

## 2020-01-24 RX ADMIN — HEPARIN SODIUM 5000 UNITS: 5000 INJECTION INTRAVENOUS; SUBCUTANEOUS at 07:30

## 2020-01-24 RX ADMIN — HYDROMORPHONE HYDROCHLORIDE 0.5 MG: 1 INJECTION, SOLUTION INTRAMUSCULAR; INTRAVENOUS; SUBCUTANEOUS at 10:11

## 2020-01-24 RX ADMIN — HEPARIN SODIUM 5000 UNITS: 5000 INJECTION INTRAVENOUS; SUBCUTANEOUS at 23:28

## 2020-01-24 RX ADMIN — OXYCODONE HYDROCHLORIDE 30 MG: 15 TABLET ORAL at 07:30

## 2020-01-24 RX ADMIN — Medication 5 ML: at 14:49

## 2020-01-24 RX ADMIN — LISINOPRIL AND HYDROCHLOROTHIAZIDE 1 TABLET: 25; 20 TABLET ORAL at 08:42

## 2020-01-24 RX ADMIN — Medication 10 ML: at 21:52

## 2020-01-24 RX ADMIN — Medication 10 ML: at 05:11

## 2020-01-24 NOTE — OP NOTES
[address] [facility]  OPERATIVE REPORT    Name:  Janette Vera  MR#:  [id number]  :  1950  ACCOUNT #:  [de-identified]  DATE OF SERVICE:  2020    CLINICAL SERVICE:  Vascular Surgery. PREOPERATIVE DIAGNOSIS:  Left leg rest pain symptoms. POSTOPERATIVE DIAGNOSIS:  Left leg rest pain symptoms. PROCEDURES PERFORMED:  1. Left superficial femoral artery to popliteal artery bypass with PTFE graft. 2.  Exploration of left popliteal artery. 3.  Intraoperative ultrasound of left greater saphenous vein. SURGEON:  Milvia Hernandez. Keshav Kay MD    ASSISTANT:     ANESTHESIA:  General.    COMPLICATIONS:  None. SPECIMENS REMOVED:  None. IMPLANTS:    ESTIMATED BLOOD LOSS:  .    INDICATIONS FOR PROCEDURE:  This is a 18-year-old male with history of peripheral vascular disease and annular disease status post endovascular repair  several years ago. He presented to my office with worsening rest pain symptoms. On workup, he was found to have a left SFA occlusion with tibial disease. He has had toe pressures of 30. Secondary to the toe pressures and also clinically rest pain, we elected to proceed with left SFA to below-knee popliteal artery bypass with ipsilateral vein. PROCEDURE IN DETAIL:  After getting informed consent, the patient was brought to the operating room. Anesthesia was then induced. Preoperative antibiotics were given before skin incision. The patient's right leg was then prepped and draped in a normal sterile fashion. Below-knee popliteal artery was exposed. We dissected down to the fascia and the gastrocnemius muscle was retracted laterally. The popliteal artery was identified and was controlled with vessel loops. We then brought ultrasound on to field. The saphenous vein was seen to be marginal on preop ultrasound.   However, there was a branch point just at the knee and it seemed like that was probably not going to have enough vein, however we elected to explore that area.  We made incision just below the knee and dissected down through the subcutaneous tissue where the saphenous vein was branched. Distal part of that vein was unusable measuring about 2 mm. We then made a counterincision and above the SFA artery we dissected down through the subcutaneous tissue and the fascia and the muscle retracted exposing SFA artery which was patent and soft. We then brought into the field a 6 x 50 PTFE graft. We tunneled the graft subfascially. We then heparinized the patient with 9,000 units of heparin and we re-dosed every 45 minutes. Once we got control of the SFA artery, an 11 blade was used to do arteriotomy on the anteromedial aspect. We extended with Pelletier scissors for about 5 cm. We spatulated the graft and did end-to-side anastomosis using 6-0 Prolene. Post procedure, there was pulsatile bleeding through the graft. We then clamped the graft. Then we got control again of the popliteal artery from below-knee pop. We used an 11 blade to do arteriotomy, however there was no lumen. We extended our incision with a Eureka blade for about 4 cm up proximally and distally just right at bifurcation of the tibioperoneal trunk, however there was no lumen. At that time, we made our counterincision just above the malleolus as the patient on ultrasound was found to have a patent posterior tibial artery. We dissected down through the subcutaneous tissue where the posterior tibial artery was identified just between the two posterior tibial veins. It was patent, however, this measured about 2.5 mm. Once we got control of the wire, a Eureka blade was used to do arteriotomy and extend lumen. There was backbleeding. We postdilated the distal end with a 1, 1.5, 2, and 2.5 vein dilators. We got just enough graft. I was able to do the graft with SFA to posterior tibial.  We spatulated and did end-to-side anastomosis using 6-0 Prolene.   Postprocedure, we backflushed and forward-flushed the artery. The patient had a good Doppler signal.  The posterior tibial artery was augmented with occlusion. We reversed the patient with 50 mg of Protamine. We held pressure for 5 minutes. The proximal and distal incisions were then closed with 2-0 Vicryl, 3-0 Vicryl and 4-0 Monocryl. The below-knee popliteal incision was closed with 2-0 Vicryl, 3-0 Vicryl and staples. The saphenous vein exploration was closed with 3-0 Vicryl and staples. The patient was extubated and transferred to PACU in stable condition.         MD ISAI [email or web address]  D:  01/23/2020 11:32  T:  01/23/2020 23:13  JOB #:  [id number]

## 2020-01-24 NOTE — PROGRESS NOTES
A follow up visit was made to the patient. Emotional support, spiritual presence and   prayer were provided for the patient.       NICK Garnica

## 2020-01-24 NOTE — PROGRESS NOTES
Bedside shift change report given to [place] [address] (oncoming nurse) by Nic Davis RN (offgoing nurse). Report included the following information SBAR, Kardex, Procedure Summary, Intake/Output, MAR, Recent Results, Cardiac Rhythm NSR / SB and Alarm Parameters .

## 2020-01-24 NOTE — PROGRESS NOTES
TRANSFER - IN REPORT:    Verbal report received from Roy Ormond, RN(name) on Sirisha Dee  being received from 3106(unit) for routine progression of care      Report consisted of patients Situation, Background, Assessment and   Recommendations(SBAR). Information from the following report(s) SBAR, Kardex, Procedure Summary, Intake/Output, MAR and Accordion was reviewed with the receiving nurse. Opportunity for questions and clarification was provided. Assessment completed upon patients arrival to unit and care assumed.

## 2020-01-24 NOTE — PROGRESS NOTES
[facility] CRITICAL CARE OUTREACH NURSE PROGRESS REPORT      SUBJECTIVE: Called to assess patient secondary to transfer from ICU. MEWS Score: 0 (01/24/20 1600)  Vitals:    01/24/20 1200 01/24/20 1229 01/24/20 1425 01/24/20 1600   BP: 122/61 115/59 134/72 113/54   Pulse: 71 66 76 63   Resp: 20 (!) 32 22 10   Temp:  98.4 °F (36.9 °C) 98.1 °F (36.7 °C) 98.1 °F (36.7 °C)   SpO2: 94% 93% 95% 94%   Weight:       Height:            LAB DATA:    Recent Labs     01/24/20  0356 01/23/20  1252    139   K 4.7 4.8    107   CO2 26 27   AGAP 5* 5*   * 138*   BUN 16 15   CREA 1.29 1.37   GFRAA >60 >60   GFRNA 59* 55*   CA 8.2* 8.6        Recent Labs     01/24/20  0356 01/23/20  1252 01/23/20  0711   WBC 7.6 8.6 7.9   HGB 10.8* 11.7* 13.9   HCT 33.7* 36.9* 42.9   * 125* 130*          OBJECTIVE: On arrival to room, I found patient to be sitting up in bed. Pain Assessment  Pain Intensity 1: 8 (01/24/20 1600)  Pain Location 1: Leg  Pain Intervention(s) 1: Medication (see MAR)  Patient Stated Pain Goal: 0    ASSESSMENT: Patient alert and oriented. Respirations even and unlabored. LLE warm with good cap refill. Reports pain in leg improved. VS, labs, and progress notes reviewed. PLAN:  Will continue to follow per outreach protocol.

## 2020-01-24 NOTE — PROGRESS NOTES
TRANSFER - OUT REPORT:    Verbal report given to [facility], RN on Celia Razo  being transferred to 7th floor for routine progression of care       Report consisted of patients Situation, Background, Assessment and   Recommendations(SBAR). Information from the following report(s) SBAR, Intake/Output, MAR, Recent Results, Med Rec Status, Cardiac Rhythm NSR and Quality Measures was reviewed with the receiving nurse. Lines:   Peripheral IV 01/23/20 Left Wrist (Active)   Site Assessment Clean, dry, & intact 1/24/2020 12:39 PM   Phlebitis Assessment 0 1/24/2020 12:39 PM   Infiltration Assessment 0 1/24/2020 12:39 PM   Dressing Status Clean, dry, & intact 1/24/2020 12:39 PM   Dressing Type Transparent;Tape 1/24/2020 12:39 PM   Hub Color/Line Status Patent; Flushed;Capped 1/24/2020 12:39 PM   Alcohol Cap Used No 1/23/2020 12:58 PM       Peripheral IV 01/23/20 Right Wrist (Active)   Site Assessment Clean, dry, & intact 1/24/2020 12:39 PM   Phlebitis Assessment 0 1/24/2020 12:39 PM   Infiltration Assessment 0 1/24/2020 12:39 PM   Dressing Status Clean, dry, & intact 1/24/2020 12:39 PM   Dressing Type Transparent;Tape 1/24/2020 12:39 PM   Hub Color/Line Status Patent; Flushed;Capped 1/24/2020 12:39 PM   Action Taken Dressing changed 1/23/2020  7:00 PM        Opportunity for questions and clarification was provided.       Patient transported with:   Registered Nurse  Tech

## 2020-01-24 NOTE — PROGRESS NOTES
01/24/20 1430   Dual Skin Pressure Injury Assessment   Dual Skin Pressure Injury Assessment WDL   Second Care Provider (Based on [address] [place] [address]) Barbara Whatley RN   Skin Integumentary   Skin Integumentary (WDL) X   Skin Color Appropriate for ethnicity   Skin Condition/Temp Dry; Warm   Skin Integrity Incision (comment)  (dsg to LLE)    Pressure  Injury Documentation No Pressure Injury Noted-Pressure Ulcer Prevention Initiated   Wound Prevention and Protection Methods   Orientation of Wound Prevention Posterior   Location of Wound Prevention Sacrum/Coccyx   Dressing Present  Yes; Intact, not due to be changed   Dressing Status Intact   Wound Offloading (Prevention Methods) Bed, pressure reduction mattress; Foam silicone;Pillows;Repositioning

## 2020-01-24 NOTE — PROGRESS NOTES
Problem: Falls - Risk of  Goal: *Absence of Falls  Description  Document Kenzie Clemons Fall Risk and appropriate interventions in the flowsheet. Outcome: Progressing Towards Goal  Note: Fall Risk Interventions:  Mobility Interventions: Bed/chair exit alarm, OT consult for ADLs, Patient to call before getting OOB, PT Consult for mobility concerns, PT Consult for assist device competence, Strengthening exercises (ROM-active/passive), Utilize walker, cane, or other assistive device         Medication Interventions: Assess postural VS orthostatic hypotension, Bed/chair exit alarm, Patient to call before getting OOB, Teach patient to arise slowly    Elimination Interventions: Bed/chair exit alarm, Call light in reach, Patient to call for help with toileting needs, Toilet paper/wipes in reach              Problem: Patient Education: Go to Patient Education Activity  Goal: Patient/Family Education  Outcome: Progressing Towards Goal     Problem: Pressure Injury - Risk of  Goal: *Prevention of pressure injury  Description  Document Jeffery Scale and appropriate interventions in the flowsheet.   Outcome: Progressing Towards Goal  Note: Pressure Injury Interventions:  Sensory Interventions: Assess changes in LOC, Assess need for specialty bed, Keep linens dry and wrinkle-free, Minimize linen layers, Monitor skin under medical devices         Activity Interventions: Increase time out of bed, Pressure redistribution bed/mattress(bed type), PT/OT evaluation    Mobility Interventions: HOB 30 degrees or less, Pressure redistribution bed/mattress (bed type), PT/OT evaluation    Nutrition Interventions: Document food/fluid/supplement intake                     Problem: Patient Education: Go to Patient Education Activity  Goal: Patient/Family Education  Outcome: Progressing Towards Goal     Problem: Patient Education: Go to Patient Education Activity  Goal: Patient/Family Education  Outcome: Progressing Towards Goal

## 2020-01-24 NOTE — PROGRESS NOTES
Problem: Falls - Risk of  Goal: *Absence of Falls  Description  Document Khadra Croft Fall Risk and appropriate interventions in the flowsheet. Outcome: Progressing Towards Goal  Note: Fall Risk Interventions:  Mobility Interventions: Bed/chair exit alarm, Strengthening exercises (ROM-active/passive)         Medication Interventions: Bed/chair exit alarm, Evaluate medications/consider consulting pharmacy                   Problem: Patient Education: Go to Patient Education Activity  Goal: Patient/Family Education  Outcome: Progressing Towards Goal     Problem: Pressure Injury - Risk of  Goal: *Prevention of pressure injury  Description  Document Jeffery Scale and appropriate interventions in the flowsheet. Outcome: Progressing Towards Goal  Note: Pressure Injury Interventions:  Sensory Interventions: Assess changes in LOC, Assess need for specialty bed, Keep linens dry and wrinkle-free, Minimize linen layers, Monitor skin under medical devices         Activity Interventions: Pressure redistribution bed/mattress(bed type)    Mobility Interventions: Pressure redistribution bed/mattress (bed type), Turn and reposition approx.  every two hours(pillow and wedges)                          Problem: Patient Education: Go to Patient Education Activity  Goal: Patient/Family Education  Outcome: Progressing Towards Goal

## 2020-01-24 NOTE — PROGRESS NOTES
[address]   [address] FAX: [phone]         VASCULAR SURGERY FLOOR PROGRESS NOTE    Admit Date: 2020  POD: 1 Day Post-Op    Procedure:  Procedure(s):  LEFT SUPERFICIAL ARTERY TO BELOW KNEE POPLITEAL ARTERY BYPASS WITH GRAFT. Subjective:     Patient has no new complaints. Objective:     Vitals:  Blood pressure 110/58, pulse 64, temperature 98.5 °F (36.9 °C), resp. rate 10, height 6' 3\" (1.905 m), weight 190 lb 11.2 oz (86.5 kg), SpO2 94 %. Temp (24hrs), Av.2 °F (36.8 °C), Min:97.2 °F (36.2 °C), Max:98.9 °F (37.2 °C)      Intake / Output:    Intake/Output Summary (Last 24 hours) at 2020 0714  Last data filed at 2020 0500  Gross per 24 hour   Intake 2364 ml   Output 1070 ml   Net 1294 ml       Physical Exam:    Constitutional: he appears well-developed. No distress. HENT:   Head: Atraumatic. Eyes: Pupils are equal, round, and reactive to light. Neck: Normal range of motion. Cardiovascular: Regular rhythm. Pulmonary/Chest: Effort normal and breath sounds normal. No respiratory distress. Abdominal: Soft. Bowel sounds are normal. he exhibits no distension. There is no tenderness. There is no guarding. No hernia. Musculoskeletal: Normal range of motion. Neurological: He is alert.  CN II- XII grossly intact  Vascular: Left foot is warm dopplerable pedal pulses    Labs:   Recent Labs     20  0356 20  1252   HGB 10.8* 11.7*   WBC 7.6 8.6   K 4.7 4.8   * 138*       Data Review     Assessment:     Patient Active Problem List    Diagnosis Date Noted    Ischemia of left lower extremity 2020    PAD (peripheral artery disease) ([facility] 2020    Skin ulcer of toe of right foot with fat layer exposed ([facility] 2019    Sensory motor neuropathy 2018    Type 2 diabetes with nephropathy ([facility] 2018    Type 2 diabetes mellitus with diabetic neuropathy ([facility] 2018    Polyneuropathy associated with underlying disease ([facility] 01/25/2017    Iron deficiency anemia 01/25/2017    Mixed hyperlipidemia 09/09/2016    Psoriasis 09/09/2016    Edema 09/08/2016    Ischemic cardiomyopathy 08/26/2016    Anemia, unspecified  08/26/2016    COPD (chronic obstructive pulmonary disease) ([facility] 02/16/2016    Diabetes mellitus type 2, controlled [facility] 02/10/2016    MSSA (methicillin susceptible Staphylococcus aureus) septicemia ([facility] 02/07/2016    Chronic pain 02/05/2016    Actinic keratosis 10/19/2015    Peripheral neuropathy 06/25/2015    Essential hypertension with goal blood pressure less than 140/90 06/22/2015    Tobacco abuse 04/21/2015    CAD (coronary artery disease) 04/20/2015    PVD (peripheral vascular disease) ([facility] 04/20/2015    Popliteal artery aneurysm, bilateral ([facility] 06/23/2014    Abdominal aortic aneurysm ([facility] 10/15/2013    Current smoker 10/15/2013       Plan/Recommendations/Medical Decision Making:     Plan transfer to the floor  Can be up and out of bed working with physical therapy no activity restrictions  -We will add subcu heparin  Possibly plan discharge home tomorrow  -Discussed with patient we will add anticoagulation possibly Eliquis to increase the patency of lower extremity-bypass graft in a patient with significant tibial disease    Elements of this note have been dictated using speech recognition software. As a result, errors of speech recognition may have occurred.

## 2020-01-24 NOTE — CONSULTS
LEAPFROG PROTOCOL NOTE    Amie Del Valle  1/24/2020    The patient is currently in the critical care setting managed by Dr. Jeff Novoa with left superficial artery to below knee popliteal artery bypass with graft. The patient's chart is reviewed and the patient is discussed with the staff. Patient is currently hemodynamically stable. Patient has no needs identified for Intensivist management in the critical care setting at this time. Please notify us if can be of assistance. No charge billed to the patient. Thank you.     Marian Rivera NP

## 2020-01-24 NOTE — PROGRESS NOTES
Problem: Mobility Impaired (Adult and Pediatric)  Goal: *Acute Goals and Plan of Care (Insert Text)  Description  STG:  (1.)Mr. Satya cunningham will move from supine to sit and sit to supine , scoot up and down and roll side to side with SUPERVISION within 3 treatment day(s). (2.)Mr. Satya cunningham will transfer from bed to chair and chair to bed with SUPERVISION using the least restrictive device within 3 treatment day(s). (3.)Mr. Satya cunningham will ambulate with STAND BY ASSIST for 10 feet with the least restrictive device within 3 treatment day(s). (4.)Mr. Satya cunningham will perform standing static and dynamic balance activities x 15 minutes with STAND BY ASSIST to improve safety within 3 day(s). (5.)Mr. Satya cunningham will perform LE exercises with 1 to 2 cues for form within 3 days to improve strength for functional transfers and ambulation. LTG:  (1.)Mr. Satya cunningham will move from supine to sit and sit to supine , scoot up and down and roll side to side in bed with MODIFIED INDEPENDENCE within 7 treatment day(s). (2.)Mr. Satya cunningham will transfer from bed to chair and chair to bed with MODIFIED INDEPENDENCE using the least restrictive device within 7 treatment day(s). (3.)Mr. Satya cunningham will ambulate with MODIFIED INDEPENDENCE for 50+ feet with the least restrictive device within 7 treatment day(s). (4.)Mr. Satya cunningham will perform standing static and dynamic balance activities x 15 minutes with MODIFIED INDEPENDENCE to improve safety within 7 day(s). (5.)Mr. Satya cunningham will ascend and descend 2 stairs using 0-1 hand rail(s) with STAND BY ASSIST to improve functional mobility and safety within 7 day(s).   ________________________________________________________________________________________________     Outcome: Progressing Towards Goal     PHYSICAL THERAPY: Initial Assessment, Daily Note, and AM 1/24/2020  INPATIENT: PT Visit Days : 1  Payor: Tisha Ulloa / Plan: [address] [place] [address] HMO / Product Type: Managed Care Medicare / NAME/AGE/GENDER: Roxi Michelle is a 77 y.o. male   PRIMARY DIAGNOSIS: PAD (peripheral artery disease) ([facility]) [I73.9]  Ischemia of left lower extremity [I99.8]  PAD [facility]peripheral artery disease) ([facility] [I73.9] <principal problem not specified> <principal problem not specified>  Procedure(s) (LRB):  LEFT SUPERFICIAL ARTERY TO BELOW KNEE POPLITEAL ARTERY BYPASS WITH GRAFT. (Left)  1 Day Post-Op  ICD-10: Treatment Diagnosis:    Generalized Muscle Weakness (M62.81)  Difficulty in walking, Not elsewhere classified (R26.2)   Precaution/Allergies:  Bactrim [sulfamethoprim]; Glipizide; Morphine; and Xanax [alprazolam]      ASSESSMENT:     Mr. Mona Puente is a 77 y.o. male admitted s/p above surgery. He lives with spouse and reports multiple family members including 14 children and 3 adults. His home is one level with 2 steps to enter and he has a single point cane he uses to ambulate, however due to severe LE pain he has been using a family member's wheelchair for mobility recently. He is supine on contact, endorses 10/10 pain however is very agreeable to transferring to recliner as he is uncomfortable in the bed. He required additional time for all mobility, however able to transfer to sitting with stand by assist. He stood with minimal assist to rolling walker, however self-selected non weight bearing (per MD there are no restrictions to mobility). Treatment initiated to include return to sitting, sitting balance activities including L knee flexion to improve safety with edge of bed sitting. Additional sit to stand transfer performed and ambulation to recliner 2' with again self-selected NWB LLE. Attempted weight shifting, however patient unable to shift weight onto LLE this AM. He will require a rolling walker at d/c to be able to complete transfers and would benefit significantly from a motorized wheelchair for community mobility.  Roxi Michelle is currently functioning below his baseline and would benefit from skilled PT during acute care stay to maximize safety and independence with functional mobility. This section established at most recent assessment   PROBLEM LIST (Impairments causing functional limitations):  Decreased Strength  Decreased ADL/Functional Activities  Decreased Transfer Abilities  Decreased Ambulation Ability/Technique  Decreased Balance  Increased Pain  Decreased Activity Tolerance  Decreased Pacing Skills  Increased Shortness of Breath  Decreased Knowledge of Precautions  Decreased [place] with Home Exercise Program   INTERVENTIONS PLANNED: (Benefits and precautions of physical therapy have been discussed with the patient.)  Balance Exercise  Bed Mobility  Family Education  Gait Training  Group Therapy  Home Exercise Program (HEP)  Neuromuscular Re-education/Strengthening  Therapeutic Activites  Therapeutic Exercise/Strengthening  Transfer Training     TREATMENT PLAN: Frequency/Duration: daily for duration of hospital stay  Rehabilitation Potential For Stated Goals: Good     REHAB RECOMMENDATIONS (at time of discharge pending progress):    Placement: It is my opinion, based on this patient's performance to date, that Mr. Shireen Murguia may benefit from participating in 1-2 additional therapy sessions in order to continue to assess for rehab potential and then make recommendation for disposition at discharge. Equipment:   None at this time              HISTORY:   History of Present Injury/Illness (Reason for Referral):  LEFT SUPERFICIAL ARTERY TO BELOW KNEE POPLITEAL ARTERY BYPASS WITH GRAFT  Past Medical History/Comorbidities:   Mr. Shireen Murguia  has a past medical history of Aneurysm ([facility] Benign hypertensive heart disease without CHF (6/22/2015), CAD (coronary artery disease), Diabetes ([address] Edema, Hypertension, Ischemic cardiomyopathy, and Other diseases of lung, not elsewhere classified.   Mr. Shireen Murguia  has a past surgical history that includes hx lumbar fusion (2009); hx coronary stent placement; vascular surgery procedure unlist (10/2013); hx heart catheterization (2007 and 2008); and hx colonoscopy. Social History/Living Environment:   Home Environment: Private residence  # Steps to Enter: 2  Rails to Enter: No  One/Two Story Residence: One story  Living Alone: No  Support Systems: Family member(s), Spouse/Significant Other/Partner, Child(greg)  Patient Expects to be Discharged to[de-identified] Private residence  Current DME Used/Available at Home: Cane, straight  Prior Level of Function/Work/Activity:  He lives with spouse and reports multiple family members including 14 children and 3 adults. His home is one level with 2 steps to enter and he has a single point cane he uses to ambulate, however due to severe LE pain he has been using a family member's wheelchair for mobility recently. Number of Personal Factors/Comorbidities that affect the Plan of Care: 3+: HIGH COMPLEXITY   EXAMINATION:   Most Recent Physical Functioning:   Gross Assessment:  AROM: Grossly decreased, non-functional  PROM: Grossly decreased, non-functional               Posture:  Posture (WDL): Exceptions to WDL  Posture Assessment: Forward head  Balance:  Sitting: Intact  Standing: Impaired  Standing - Static: Fair  Standing - Dynamic : Fair Bed Mobility:  Rolling: Stand-by assistance; Additional time  Supine to Sit: Additional time;Stand-by assistance  Scooting: Contact guard assistance  Wheelchair Mobility:     Transfers:  Sit to Stand: Minimum assistance  Stand to Sit: Minimum assistance  Bed to Chair: Minimum assistance  Gait:     Base of Support: Narrowed; Center of gravity altered  Speed/Candi: Slow;Shuffled;Pace decreased (<100 feet/min)  Step Length: Right shortened;Left shortened  Gait Abnormalities: Antalgic;Decreased step clearance; Path deviations;Trunk sway increased(self selected NWB LLE)  Distance (ft): 2 Feet (ft)  Assistive Device: Walker, rolling  Ambulation - Level of Assistance: Minimal assistance;Contact guard assistance  Interventions: Safety awareness training;Manual cues; Visual/Demos; Verbal cues      Body Structures Involved:  Nerves  Muscles Body Functions Affected:  Sensory/Pain  Neuromusculoskeletal  Movement Related Activities and Participation Affected: Mobility  Self Care  Domestic Life  Interpersonal Interactions and Relationships  Community, Social and [place]   Number of elements that affect the Plan of Care: 4+: HIGH COMPLEXITY   CLINICAL PRESENTATION:   Presentation: Evolving clinical presentation with changing clinical characteristics: MODERATE COMPLEXITY   CLINICAL DECISION MAKIN [facility] Mobility Inpatient Short Form  How much difficulty does the patient currently have. .. Unable A Lot A Little None   1. Turning over in bed (including adjusting bedclothes, sheets and blankets)? [] 1   [x] 2   [] 3   [] 4   2. Sitting down on and standing up from a chair with arms ( e.g., wheelchair, bedside commode, etc.)   [] 1   [x] 2   [] 3   [] 4   3. Moving from lying on back to sitting on the side of the bed? [] 1   [x] 2   [] 3   [] 4   How much help from another person does the patient currently need. .. Total A Lot A Little None   4. Moving to and from a bed to a chair (including a wheelchair)? [] 1   [x] 2   [] 3   [] 4   5. Need to walk in hospital room? [] 1   [x] 2   [] 3   [] 4   6. Climbing 3-5 steps with a railing? [x] 1   [] 2   [] 3   [] 4   © , Trustees of [address] [place] [address], under license to [employer]. All rights reserved      Score:  Initial: 11 Most Recent: X (Date: -- )    Interpretation of Tool:  Represents activities that are increasingly more difficult (i.e. Bed mobility, Transfers, Gait). Medical Necessity:     Patient demonstrates   good   rehab potential due to higher previous functional level.   Reason for Services/Other Comments:  Patient   continues to require modification of therapeutic interventions to increase complexity of exercises  . Use of outcome tool(s) and clinical judgement create a POC that gives a: Questionable prediction of patient's progress: MODERATE COMPLEXITY            TREATMENT:   (In addition to Assessment/Re-Assessment sessions the following treatments were rendered)   Pre-treatment Symptoms/Complaints:  LLE pain  Pain: Initial:   Pain Intensity 1: 10  Post Session:  5/10 once sitting in recliner (increase in pain during mobility requiring pain medicine from RN)     Therapeutic Exercise: ( 23 minutes):  Exercises per grid below to improve mobility, strength, and balance. Required minimal visual, verbal, and manual cues to promote proper body alignment, promote proper body posture, and promote proper body mechanics. Progressed complexity of movement as indicated. Date:  1/24/20 Date:   Date:     Activity/Exercise Parameters Parameters Parameters   Sit to stands x2     Weight shifting onto LLE X1 unable to accept weight d/t pain     Ambulation 2'      Sitting balance activities X15 minutes     Standing balance activitites X3 minutes                     Braces/Orthotics/Lines/Etc:   O2 Room Air   Treatment/Session Assessment:    Response to Treatment:  Patient experienced increase in pain with mobility, however overall more comfortable in recliner. Interdisciplinary Collaboration:   Physical Therapist  Registered Nurse  After treatment position/precautions:   Up in chair  Bed/Chair-wheels locked  Bed in low position  Call light within reach  RN notified  Family at bedside   Compliance with Program/Exercises: Will assess as treatment progresses  Recommendations/Intent for next treatment session: \"Next visit will focus on advancements to more challenging activities and reduction in assistance provided\".   Total Treatment Duration:  PT Patient Time In/Time Out  Time In: 0952  Time Out: 150 Abel Perry PT, DPT

## 2020-01-25 ENCOUNTER — HOME HEALTH ADMISSION (OUTPATIENT)
Dept: HOME HEALTH SERVICES | Facility: HOME HEALTH | Age: 70
End: 2020-01-25
Payer: MEDICARE

## 2020-01-25 VITALS
SYSTOLIC BLOOD PRESSURE: 99 MMHG | OXYGEN SATURATION: 96 % | BODY MASS INDEX: 23.71 KG/M2 | DIASTOLIC BLOOD PRESSURE: 59 MMHG | WEIGHT: 190.7 LBS | HEART RATE: 72 BPM | HEIGHT: 75 IN | TEMPERATURE: 97.4 F | RESPIRATION RATE: 16 BRPM

## 2020-01-25 LAB
ANION GAP SERPL CALC-SCNC: 7 MMOL/L (ref 7–16)
BUN SERPL-MCNC: 19 MG/DL (ref 8–23)
CALCIUM SERPL-MCNC: 9 MG/DL (ref 8.3–10.4)
CHLORIDE SERPL-SCNC: 105 MMOL/L (ref 98–107)
CO2 SERPL-SCNC: 24 MMOL/L (ref 21–32)
CREAT SERPL-MCNC: 1.19 MG/DL (ref 0.8–1.5)
ERYTHROCYTE [DISTWIDTH] IN BLOOD BY AUTOMATED COUNT: 15.3 % (ref 11.9–14.6)
GLUCOSE BLD STRIP.AUTO-MCNC: 161 MG/DL (ref 65–100)
GLUCOSE BLD STRIP.AUTO-MCNC: 195 MG/DL (ref 65–100)
GLUCOSE SERPL-MCNC: 185 MG/DL (ref 65–100)
HCT VFR BLD AUTO: 37 % (ref 41.1–50.3)
HGB BLD-MCNC: 11.7 G/DL (ref 13.6–17.2)
MCH RBC QN AUTO: 28.3 PG (ref 26.1–32.9)
MCHC RBC AUTO-ENTMCNC: 31.6 G/DL (ref 31.4–35)
MCV RBC AUTO: 89.6 FL (ref 79.6–97.8)
NRBC # BLD: 0 K/UL (ref 0–0.2)
PLATELET # BLD AUTO: 116 K/UL (ref 150–450)
PMV BLD AUTO: 12.5 FL (ref 9.4–12.3)
POTASSIUM SERPL-SCNC: 4.6 MMOL/L (ref 3.5–5.1)
RBC # BLD AUTO: 4.13 M/UL (ref 4.23–5.6)
SODIUM SERPL-SCNC: 136 MMOL/L (ref 136–145)
WBC # BLD AUTO: 8.9 K/UL (ref 4.3–11.1)

## 2020-01-25 PROCEDURE — 97110 THERAPEUTIC EXERCISES: CPT

## 2020-01-25 PROCEDURE — 74011250636 HC RX REV CODE- 250/636: Performed by: SURGERY

## 2020-01-25 PROCEDURE — 82962 GLUCOSE BLOOD TEST: CPT

## 2020-01-25 PROCEDURE — 80048 BASIC METABOLIC PNL TOTAL CA: CPT

## 2020-01-25 PROCEDURE — 36415 COLL VENOUS BLD VENIPUNCTURE: CPT

## 2020-01-25 PROCEDURE — 74011250637 HC RX REV CODE- 250/637: Performed by: SURGERY

## 2020-01-25 PROCEDURE — 97116 GAIT TRAINING THERAPY: CPT

## 2020-01-25 PROCEDURE — 85027 COMPLETE CBC AUTOMATED: CPT

## 2020-01-25 PROCEDURE — 97530 THERAPEUTIC ACTIVITIES: CPT

## 2020-01-25 RX ADMIN — METFORMIN HYDROCHLORIDE 1000 MG: 500 TABLET ORAL at 08:51

## 2020-01-25 RX ADMIN — Medication 10 ML: at 05:11

## 2020-01-25 RX ADMIN — LISINOPRIL AND HYDROCHLOROTHIAZIDE 1 TABLET: 25; 20 TABLET ORAL at 08:56

## 2020-01-25 RX ADMIN — ASPIRIN 81 MG: 81 TABLET ORAL at 08:51

## 2020-01-25 RX ADMIN — OXYCODONE HYDROCHLORIDE 30 MG: 15 TABLET ORAL at 06:52

## 2020-01-25 RX ADMIN — OXYCODONE HYDROCHLORIDE 30 MG: 15 TABLET ORAL at 00:03

## 2020-01-25 RX ADMIN — METOPROLOL SUCCINATE 50 MG: 50 TABLET, EXTENDED RELEASE ORAL at 08:51

## 2020-01-25 RX ADMIN — HEPARIN SODIUM 5000 UNITS: 5000 INJECTION INTRAVENOUS; SUBCUTANEOUS at 08:50

## 2020-01-25 NOTE — PROGRESS NOTES
Problem: Mobility Impaired (Adult and Pediatric)  Goal: *Acute Goals and Plan of Care (Insert Text)  Description  STG:  (1.)Mr. Viv Rubi will move from supine to sit and sit to supine , scoot up and down and roll side to side with SUPERVISION within 3 treatment day(s). (2.)Mr. Viv Rubi will transfer from bed to chair and chair to bed with SUPERVISION using the least restrictive device within 3 treatment day(s). (3.)Mr. Viv Rubi will ambulate with STAND BY ASSIST for 10 feet with the least restrictive device within 3 treatment day(s). (4.)Mr. Viv Rubi will perform standing static and dynamic balance activities x 15 minutes with STAND BY ASSIST to improve safety within 3 day(s). (5.)Mr. Viv Rubi will perform LE exercises with 1 to 2 cues for form within 3 days to improve strength for functional transfers and ambulation. LTG:  (1.)Mr. Viv Rubi will move from supine to sit and sit to supine , scoot up and down and roll side to side in bed with MODIFIED INDEPENDENCE within 7 treatment day(s). (2.)Mr. Viv Rubi will transfer from bed to chair and chair to bed with MODIFIED INDEPENDENCE using the least restrictive device within 7 treatment day(s). (3.)Mr. Viv Rubi will ambulate with MODIFIED INDEPENDENCE for 50+ feet with the least restrictive device within 7 treatment day(s). (4.)Mr. Viv Rubi will perform standing static and dynamic balance activities x 15 minutes with MODIFIED INDEPENDENCE to improve safety within 7 day(s). (5.)Mr. Viv Rubi will ascend and descend 2 stairs using 0-1 hand rail(s) with STAND BY ASSIST to improve functional mobility and safety within 7 day(s).   ________________________________________________________________________________________________     Outcome: Progressing Towards Goal     PHYSICAL THERAPY: Daily Note and AM 1/25/2020  INPATIENT: PT Visit Days : 2  Payor: Cherie Andre / Plan: [address] [place] [address] HMO / Product Type: Managed Care Medicare /       NAME/AGE/GENDER: Navi Werner Benito Morris is a 77 y.o. male   PRIMARY DIAGNOSIS: PAD (peripheral artery disease) ([facility]) [I73.9]  Ischemia of left lower extremity [I99.8]  PAD (peripheral artery disease) ([facility]) [I73.9] <principal problem not specified> <principal problem not specified>  Procedure(s) (LRB):  LEFT SUPERFICIAL ARTERY TO BELOW KNEE POPLITEAL ARTERY BYPASS WITH GRAFT. (Left)  2 Days Post-Op  ICD-10: Treatment Diagnosis:    · Generalized Muscle Weakness (M62.81)  · Difficulty in walking, Not elsewhere classified (R26.2)   Precaution/Allergies:  Bactrim [sulfamethoprim]; Glipizide; Morphine; and Xanax [alprazolam]      ASSESSMENT:     Mr. Benito Morris is a 77 y.o. male admitted s/p above surgery. He lives with spouse and reports multiple family members including 14 children and 3 adults. His home is one level with 2 steps to enter and he has a single point cane he uses to ambulate, however due to severe LE pain he has been using a family member's wheelchair for mobility recently. He is up in the chair and planning on going home today. He is doing well with functional mobility as far as walking and transfers. He is actively working on trying to improve his ROM but is still walking NWB. Worked on exercises below and gait training with improving technique and putting weight through his L leg. He should be fine to return home with HHPT to continue to improve. This section established at most recent assessment   PROBLEM LIST (Impairments causing functional limitations):  1. Decreased Strength  2. Decreased ADL/Functional Activities  3. Decreased Transfer Abilities  4. Decreased Ambulation Ability/Technique  5. Decreased Balance  6. Increased Pain  7. Decreased Activity Tolerance  8. Decreased Pacing Skills  9. Increased Shortness of Breath  10. Decreased Knowledge of Precautions  11.  Decreased [place] with Home Exercise Program   INTERVENTIONS PLANNED: (Benefits and precautions of physical therapy have been discussed with the patient.)  1. Balance Exercise  2. Bed Mobility  3. Family Education  4. Gait Training  5. Group Therapy  6. Home Exercise Program (HEP)  7. Neuromuscular Re-education/Strengthening  8. Therapeutic Activites  9. Therapeutic Exercise/Strengthening  10. Transfer Training     TREATMENT PLAN: Frequency/Duration: daily for duration of hospital stay  Rehabilitation Potential For Stated Goals: Good     REHAB RECOMMENDATIONS (at time of discharge pending progress):    Placement: It is my opinion, based on this patient's performance to date, that Mr. Sriram Chester may benefit from [address] after discharge due to the functional deficits listed above that are likely to improve with skilled rehabilitation because he/she has multiple medical issues that affect his/her functional mobility in the community. Equipment:    has walkers at home              HISTORY:   History of Present Injury/Illness (Reason for Referral):  LEFT SUPERFICIAL ARTERY TO BELOW KNEE POPLITEAL ARTERY BYPASS WITH GRAFT  Past Medical History/Comorbidities:   Mr. Sriram Chester  has a past medical history of Aneurysm ([facility] Benign hypertensive heart disease without CHF (6/22/2015), CAD (coronary artery disease), Diabetes ([facility] Edema, Hypertension, Ischemic cardiomyopathy, and Other diseases of lung, not elsewhere classified. Mr. Sriram Chester  has a past surgical history that includes hx lumbar fusion (2009); hx coronary stent placement; vascular surgery procedure unlist (10/2013); hx heart catheterization (2007 and 2008); and hx colonoscopy.   Social History/Living Environment:   Home Environment: Private residence  # Steps to Enter: 2  Rails to Enter: No  One/Two Story Residence: One story  Living Alone: No  Support Systems: Family member(s), Spouse/Significant Other/Partner, Child(greg)  Patient Expects to be Discharged to[de-identified] Private residence  Current DME Used/Available at Home: Cane, straight  Prior Level of Function/Work/Activity:  He lives with spouse and reports multiple family members including 14 children and 3 adults. His home is one level with 2 steps to enter and he has a single point cane he uses to ambulate, however due to severe LE pain he has been using a family member's wheelchair for mobility recently. Number of Personal Factors/Comorbidities that affect the Plan of Care: 3+: HIGH COMPLEXITY   EXAMINATION:   Most Recent Physical Functioning:   Gross Assessment:                  Posture:     Balance:    Bed Mobility:     Wheelchair Mobility:     Transfers:  Sit to Stand: Modified independent  Stand to Sit: Independent  Gait:     Distance (ft): 50 Feet (ft)  Assistive Device: Walker, rolling  Ambulation - Level of Assistance: Modified independent      Body Structures Involved:  1. Nerves  2. Muscles Body Functions Affected:  1. Sensory/Pain  2. Neuromusculoskeletal  3. Movement Related Activities and Participation Affected:  1. Mobility  2. Self Care  3. Domestic Life  4. Interpersonal Interactions and Relationships  5. Community, Social and [place]   Number of elements that affect the Plan of Care: 4+: HIGH COMPLEXITY   CLINICAL PRESENTATION:   Presentation: Evolving clinical presentation with changing clinical characteristics: MODERATE COMPLEXITY   CLINICAL DECISION MAKIN [facility] Inpatient Short Form  How much difficulty does the patient currently have. .. Unable A Lot A Little None   1. Turning over in bed (including adjusting bedclothes, sheets and blankets)? [] 1   [x] 2   [] 3   [] 4   2. Sitting down on and standing up from a chair with arms ( e.g., wheelchair, bedside commode, etc.)   [] 1   [x] 2   [] 3   [] 4   3. Moving from lying on back to sitting on the side of the bed? [] 1   [x] 2   [] 3   [] 4   How much help from another person does the patient currently need. .. Total A Lot A Little None   4. Moving to and from a bed to a chair (including a wheelchair)?    [] 1   [x] 2   [] 3 [] 4   5. Need to walk in hospital room? [] 1   [x] 2   [] 3   [] 4   6. Climbing 3-5 steps with a railing? [x] 1   [] 2   [] 3   [] 4   © 2007, Trustees of [address] [place] [address] [address] [id number], under license to [employer]. All rights reserved      Score:  Initial: 11 Most Recent: X (Date: -- )    Interpretation of Tool:  Represents activities that are increasingly more difficult (i.e. Bed mobility, Transfers, Gait). Medical Necessity:     · Patient demonstrates   · good  ·  rehab potential due to higher previous functional level. Reason for Services/Other Comments:  · Patient   · continues to require modification of therapeutic interventions to increase complexity of exercises  · . Use of outcome tool(s) and clinical judgement create a POC that gives a: Questionable prediction of patient's progress: MODERATE COMPLEXITY            TREATMENT:   (In addition to Assessment/Re-Assessment sessions the following treatments were rendered)   Pre-treatment Symptoms/Complaints:  LLE pain  Pain: Initial:   Pain Intensity 1: 5  Post Session:  No complaints     Therapeutic Exercise: (15 Minutes):  Exercises per grid below to improve mobility, strength, and balance. Required minimal visual, verbal, and manual cues to promote proper body alignment, promote proper body posture, and promote proper body mechanics. Progressed complexity of movement as indicated. Gait Training (  10 minutes):  Gait training to improve and/or restore physical functioning as related to mobility and strength. Ambulated 50 Feet (ft) with Modified independent using a Walker, rolling and moderate   related to their stance phase and heel strike to promote proper body mechanics.       Date:  1/24/20 Date:  1/25/20 Date:     Activity/Exercise Parameters Parameters Parameters   Sit to stands x2     Weight shifting onto LLE X1 unable to accept weight d/t pain     Ambulation 2'      Sitting balance activities X15 minutes     Standing balance activitites X3 minutes Seated TKE  15x AA    Seated AP  15x L    Seated toe taps and heel taps  15x L        Braces/Orthotics/Lines/Etc:   · O2 Room Air   Treatment/Session Assessment:    · Response to Treatment:  Patient experienced increase in pain with mobility, however overall more comfortable in recliner. · Interdisciplinary Collaboration:   o Physical Therapy Assistant  o Registered Nurse  · After treatment position/precautions:   o Up in chair  o Bed/Chair-wheels locked  o Bed in low position  o Call light within reach  o RN notified   · Compliance with Program/Exercises: Compliant all of the time  · Recommendations/Intent for next treatment session: \"Next visit will focus on advancements to more challenging activities and reduction in assistance provided\".   Total Treatment Duration:  PT Patient Time In/Time Out  Time In: 1005  Time Out: [address] [facility], \[facility] [place] [facility]\""

## 2020-01-25 NOTE — PROGRESS NOTES
[address]  Face to Face Encounter    Patients Name: Violet Michael    YOB: 1950    Ordering Physician: Dr. Andrey Lacy    Primary Diagnosis: PAD (peripheral artery disease) ([facility] [I73.9]  Ischemia of left lower extremity [I99.8]  PAD (peripheral artery disease) ([facility] [I73.9]    Date of Face to Face:   1/25/2020                                  Face to Face Encounter findings are related to primary reason for home care:   yes. 1. I certify that the patient needs intermittent care as follows: skilled nursing care:  teaching/training of medication education; signs/symptoms of infection; DM education and skilled observation/assessment, patient education  physical therapy: strengthening, stretching/ROM, transfer training, gait/stair training, balance training and pt/caregiver education    2. I certify that this patient is homebound, that is: 1) patient requires the use of a walker device, special transportation, or assistance of another to leave the home; or 2) patient's condition makes leaving the home medically contraindicated; and 3) patient has a normal inability to leave the home and leaving the home requires considerable and taxing effort. Patient may leave the home for infrequent and short duration for medical reasons, and occasional absences for non-medical reasons. Homebound status is due to the following functional limitations: Limited ambulation secondary to PAD; s/p LEFT SUPERFICIAL ARTERY TO BELOW KNEE POPLITEAL ARTERY BYPASS WITH GRAFT. Ambulation limited to ad david feet with the use of a walker (assistive device). Patient currently under activity restrictions secondary to recent surgical procedure, this hinders their ability to safely leave the home. Patient with strength deficits limiting the performance of all ADL's without caregiver assistance or the use of an assistive device.     3. I certify that this patient is under my care and that I, or a nurse practitioner or  [id number], or clinical nurse specialist, or certified nurse midwife, working with me, had a Face-to-Face Encounter that meets the physician Face-to-Face Encounter requirements. The following are the clinical findings from the [address] [place] [address] encounter that support the need for skilled services and is a summary of the encounter:     See hospital chart      Angi Harrison, [facility]  1/25/2020      THE FOLLOWING TO BE COMPLETED BY THE COMMUNITY PHYSICIAN:    I concur with the findings described above from the F2F encounter that this patient is homebound and in need of a skilled service.     Certifying Physician: _____________________________________      Printed Certifying Physician Name: _____________________________________    Date: _________________

## 2020-01-25 NOTE — PROGRESS NOTES
Pt is medically cleared for discharge to home today. [facility]. Pt provided with list of agencies and requested a referral to [facility]. Referral submitted and F2F completed. TC to [facility] intake to alert them to the new referral.  No other discharge needs or concerns identified or reported. Care Management Interventions  PCP Verified by CM: Yes(confirms Dr. Lester Perez)  Mode of Transport at Discharge: Other (see comment)(spouse)  Transition of Care Consult (CM Consult): Home Health  Discharge Durable Medical Equipment: No  Physical Therapy Consult: Yes  Occupational Therapy Consult: No  Speech Therapy Consult: No  Current Support Network: Lives with Spouse, Own Home  Confirm Follow Up Transport: Family  The Plan for Transition of Care is Related to the Following Treatment Goals : home health nursing and PT for education and improved physical functioning.   The Patient and/or Patient Representative was Provided with a Choice of Provider and Agrees with the Discharge Plan?: Yes  Freedom of Choice List was Provided with Basic Dialogue that Supports the Patient's Individualized Plan of Care/Goals, Treatment Preferences and Shares the Quality Data Associated with the Providers?: Yes  The [employer] Information Provided?: (confirms [facility])  Discharge Location  Discharge Placement: Home with home health

## 2020-01-25 NOTE — PROGRESS NOTES
Patient patient seen and examined no acute issues overnight. Left lower extremity warm with palpable pedal pulses. Plan to discharge patient today follow-up in 2 weeks. Patient has chronic lower back pain and had additional pain medication at home we will not give her prescription for pain medication today. We will give patient Xarelto 2.5 twice daily to increase patency of lower extremity graft. Discussed with patient that he continue to smoke and graft were to occlude, patient may not have any revascularization options.     Caroline Tolliver

## 2020-01-25 NOTE — DISCHARGE SUMMARY
[address]   [address] FAX: [phone]          Physician Discharge Summary     Patient: [place] Postal MRN: [id number]  SSN: [id number]    YOB: 1950  Age: 77 y.o. Sex: male       Admit Date: 1/23/2020    Discharge Date: 1/25/2020      Admitting Physician: Ashwini Guzman MD     Discharge Physician: Ashwini Guzman MD    Admission Diagnoses: PAD (peripheral artery disease) ([facility] [I73.9]; Ischemia of left lower extremity [I99.8];PAD (peripheral artery disease) ([facility] [I73.9]    Discharge Diagnoses:   Problem List as of 1/25/2020 Date Reviewed: 1/23/2020          Codes Class Noted - Resolved    Ischemia of left lower extremity ICD-10-CM: I99.8  ICD-9-CM: 459.9  1/23/2020 - Present        PAD (peripheral artery disease) ([facility] ICD-10-CM: I73.9  ICD-9-CM: 443.9  1/23/2020 - Present        Skin ulcer of toe of right foot with fat layer exposed ([facility] ICD-10-CM: B88.163  ICD-9-CM: 707.15  6/18/2019 - Present        Sensory motor neuropathy ICD-10-CM: G62.9  ICD-9-CM: 356.9  5/1/2018 - Present        Type 2 diabetes with nephropathy ([facility] ICD-10-CM: E11.21  ICD-9-CM: 250.40, 583.81  4/30/2018 - Present        Type 2 diabetes mellitus with diabetic neuropathy ([facility] ICD-10-CM: E11.40  ICD-9-CM: 250.60, 357.2  4/30/2018 - Present        Polyneuropathy associated with underlying disease ([facility] (Chronic) ICD-10-CM: G63  ICD-9-CM: 357.4  1/25/2017 - Present        Iron deficiency anemia ICD-10-CM: D50.9  ICD-9-CM: 280.9  1/25/2017 - Present        Mixed hyperlipidemia ICD-10-CM: E78.2  ICD-9-CM: 272.2  9/9/2016 - Present        Psoriasis ICD-10-CM: L40.9  ICD-9-CM: 696.1  9/9/2016 - Present        Edema ICD-10-CM: R60.9  ICD-9-CM: 782.3  9/8/2016 - Present        Ischemic cardiomyopathy ICD-10-CM: I25.5  ICD-9-CM: 414.8  8/26/2016 - Present    Overview Addendum 6/12/2019 10:12 PM by Susi Sparrow MD     1. [facility] (9/2007) :  EF 20%  2.   [facility] (10/27/08): 40%  3. Echo (1/14/11): Mild LV dysfunction. EF 45%. Impaired relaxation. Moderate left atrial enlargement. Mild mitral/tricuspid/pulmonic regurgitation. 4.   Echo (2/16):  EF 55-60%. 5.  Echo (6/11/19):  EF 55-60%. Inferior HK. Mild LAE.  AVSC. Anemia, unspecified  ICD-10-CM: D64.9  ICD-9-CM: 285.9  8/26/2016 - Present    Overview Signed 8/26/2016  1:56 PM by Esau Jacob     1. Admitted April 2015 with anemia. Received 2 units. Iron deficiency. COPD (chronic obstructive pulmonary disease) (HCC) (Chronic) ICD-10-CM: J44.9  ICD-9-CM: 496  2/16/2016 - Present        Diabetes mellitus type 2, controlled ([facility] (Chronic) ICD-10-CM: E11.9  ICD-9-CM: 250.00  2/10/2016 - Present        MSSA (methicillin susceptible Staphylococcus aureus) septicemia ([facility] ICD-10-CM: A41.01  ICD-9-CM: 038.11  2/7/2016 - Present        Chronic pain (Chronic) ICD-10-CM: N77.23  ICD-9-CM: 338.29  2/5/2016 - Present        Actinic keratosis (Chronic) ICD-10-CM: L57.0  ICD-9-CM: 702.0  10/19/2015 - Present        Peripheral neuropathy (Chronic) ICD-10-CM: G62.9  ICD-9-CM: 356.9  6/25/2015 - Present        Essential hypertension with goal blood pressure less than 140/90 ICD-10-CM: I10  ICD-9-CM: 401.9  6/22/2015 - Present        Tobacco abuse (Chronic) ICD-10-CM: Z72.0  ICD-9-CM: 305.1  4/21/2015 - Present        CAD (coronary artery disease) (Chronic) ICD-10-CM: I25.10  ICD-9-CM: 414.00  4/20/2015 - Present    Overview Addendum 9/8/2016  8:43 PM by Panchito Sultana MD     1. Inferior MI (9/2007):  PCI :  Overlapping 2.5 X 23, 3 X 18, 3 X 28 and 4 X 23 [facility] B[facility]  2. Inferior MI  (10/27/08)  a. Cath: EF is 40% with inferior akinesis. Mild disease in left system. RCA: 50% diffuse in-stent restenosis. Acutely occluded at  the distal portion. b. PCI:  3.5 X 13 mm Cypher at PDA then entire stented segment(including previous stents) dilated to 4.25.               PVD (peripheral vascular disease) ([address] (Chronic) ICD-10-CM: I73.9  ICD-9-CM: 443.9  4/20/2015 - Present        Popliteal artery aneurysm, bilateral (HCC) (Chronic) ICD-10-CM: I72.4  ICD-9-CM: 442.3  6/23/2014 - Present        Abdominal aortic aneurysm (HCC) ICD-10-CM: I71.4  ICD-9-CM: 441.4  10/15/2013 - Present    Overview Addendum 9/8/2016  8:44 PM by MD Dr. Mary Ann Ledesma. 1.    Endovascular repair of AAA and right common iliac artery aneurysm (10/14/13)             Current smoker (Chronic) ICD-10-CM: K99.310  ICD-9-CM: 305.1  10/15/2013 - Present        RESOLVED: Acute respiratory failure ([facility] ICD-10-CM: J96.00  ICD-9-CM: 518.81  2/9/2016 - 2/15/2016        RESOLVED: Acute encephalopathy ICD-10-CM: G93.40  ICD-9-CM: 348.30  2/5/2016 - 2/15/2016        RESOLVED: Sepsis ([facility] ICD-10-CM: A41.9  ICD-9-CM: 038.9, 995.91  2/5/2016 - 2/15/2016               Procedures for this admission: Procedure(s):  LEFT SUPERFICIAL ARTERY TO BELOW KNEE POPLITEAL ARTERY BYPASS WITH GRAFT. Discharged Condition: stable    Hospital Course: Uncomplicated none none    Consults:     Significant Diagnostic Studies:     Treatments: Left lower extremity bypass    Discharge Exam: Palpable pedal pulses incision intact    Disposition: Discharged home we will add additional Xarelto 2.5 twice daily    Patient Instructions:   Current Discharge Medication List      CONTINUE these medications which have NOT CHANGED    Details   aspirin delayed-release 81 mg tablet Take 81 mg by mouth daily. Start 1/3/19 and take while Plavix is being held. Will stop aspirin once Plavix is restarted Postoperatively      oxyCODONE IR (ROXICODONE) 30 mg immediate release tablet Take 30 mg by mouth three (3) times daily as needed for Pain. lisinopril-hydroCHLOROthiazide (PRINZIDE, ZESTORETIC) 20-25 mg per tablet Take 1 Tab by mouth daily.   Qty: 90 Tab, Refills: 3    Associated Diagnoses: Essential hypertension with goal blood pressure less than 140/90      metoprolol succinate (TOPROL-XL) 50 mg XL tablet Take 1 Tab by mouth daily. Qty: 80 Tab, Refills: 3    Associated Diagnoses: Coronary artery disease involving native coronary artery of native heart without angina pectoris; Ischemic cardiomyopathy; Essential hypertension with goal blood pressure less than 140/90      pravastatin (PRAVACHOL) 40 mg tablet Take 1 Tab by mouth nightly. Qty: 90 Tab, Refills: 3    Associated Diagnoses: Essential hypertension with goal blood pressure less than 140/90      metFORMIN (GLUCOPHAGE) 1,000 mg tablet Take 1 Tab by mouth two (2) times daily (with meals). Qty: 180 Tab, Refills: 3    Associated Diagnoses: Type II diabetes mellitus with manifestations, uncontrolled ([facility] Type 2 diabetes mellitus with diabetic neuropathy, unspecified whether long term insulin use ([facility])      gabapentin (NEURONTIN) 600 mg tablet 2 tablets po three times daily  Indications: Neuropathic Pain  Qty: 540 Tab, Refills: 5    Associated Diagnoses: Type 2 diabetes mellitus with diabetic neuropathy, unspecified whether long term insulin use ([facility])      clopidogrel (PLAVIX) 75 mg tab Take 1 Tab by mouth daily. Qty: 80 Tab, Refills: 3    Associated Diagnoses: Coronary artery disease involving native coronary artery of native heart without angina pectoris      !! miscellaneous medical supply [facility] Handicap Placard - Use for poor ambulation  Qty: 1 Each, Refills: 0    Associated Diagnoses: Type 2 diabetes mellitus with diabetic neuropathy, unspecified whether long term insulin use ([facility]      ! ! miscellaneous medical supply misc Diabetic shoes - for uncontrolled diabetes, Icd-10 E- 11.8, peripheral neuropathy, left large toe, diabetic foot ulcer  Qty: 1 Each, Refills: 0    Associated Diagnoses: Type 2 diabetes mellitus with diabetic neuropathy, unspecified whether long term insulin use ([facility]      ! ! miscellaneous medical supply misc Glucose test meter, icd-10 E 11.8, check up to 3 times daily  Qty: 1 Each, Refills: 0    Associated Diagnoses: Type II diabetes mellitus with manifestations, uncontrolled ([facility]      ! ! miscellaneous medical supply misc Glucose test strips - icd-10 E11.8  - test up to 3 times daily  Qty: 100 Each, Refills: 11    Associated Diagnoses: Type II diabetes mellitus with manifestations, uncontrolled ([facility]      ! ! miscellaneous medical supply misc Lancets - use for up to 3 times daily glucose testing, icd-10 E 11.8  Qty: 100 Each, Refills: 11    Associated Diagnoses: Type II diabetes mellitus with manifestations, uncontrolled ([facility])      nitroglycerin (NITROSTAT) 0.4 mg SL tablet 1 Tab by SubLINGual route every five (5) minutes as needed for Chest Pain. Qty: 1 Bottle, Refills: 6       !! - Potential duplicate medications found. Please discuss with provider. Reference discharge instructions as provided by nursing for diet, labs, medications, activity, wound care and any outpatient referrals. Follow-up Appointments   Procedures    FOLLOW UP VISIT Appointment in: Two Weeks     Standing Status:   Standing     Number of Occurrences:   1     Order Specific Question:   Appointment in     Answer:    Two Weeks        Signed:  Kalyn Lomeli MD  1/25/2020  10:41 AM

## 2020-01-25 NOTE — PROGRESS NOTES
Patient is calm  Sitting in chair    He said his surgery went well  Encouraged him    Jade Perez, staff [address] [facility]  /   [email or web address]

## 2020-01-27 ENCOUNTER — PATIENT OUTREACH (OUTPATIENT)
Dept: CASE MANAGEMENT | Age: 70
End: 2020-01-27

## 2020-01-27 ENCOUNTER — HOME CARE VISIT (OUTPATIENT)
Dept: SCHEDULING | Facility: HOME HEALTH | Age: 70
End: 2020-01-27
Payer: MEDICARE

## 2020-01-27 VITALS
TEMPERATURE: 97.5 F | SYSTOLIC BLOOD PRESSURE: 120 MMHG | RESPIRATION RATE: 18 BRPM | DIASTOLIC BLOOD PRESSURE: 62 MMHG | HEART RATE: 60 BPM

## 2020-01-27 PROCEDURE — 400013 HH SOC

## 2020-01-27 PROCEDURE — 3331090002 HH PPS REVENUE DEBIT

## 2020-01-27 PROCEDURE — 3331090001 HH PPS REVENUE CREDIT

## 2020-01-27 PROCEDURE — G0299 HHS/HOSPICE OF RN EA 15 MIN: HCPCS

## 2020-01-27 PROCEDURE — G0151 HHCP-SERV OF PT,EA 15 MIN: HCPCS

## 2020-01-27 NOTE — PROGRESS NOTES
This note will not be viewable in [address] Transition of Care Discharge Follow-up Questionnaire   Date/Time of Call:   01/27/2020   11:09am   What was the patient hospitalized for? Ischemia of left lower extremity s/pLEFT SUPERFICIAL ARTERY TO BELOW KNEE POPLITEAL ARTERY BYPASS WITH GRAFT. (Left Leg)   Does the patient understand his/her diagnosis and/or treatment and what happened during the hospitalization? Yes, spoke with patient states understanding of diagnosis and treatment; and is agreeable to call. Patient states doing ok and states grateful for the call. Did the patient receive discharge instructions? Yes    CM Assessed Risk for Readmission:       Patient stated Risk for Readmission:      Moderate to High r/t diagnosis and/or comorbidities and/or complication of surgery. None Stated   Review any discharge instructions (see discharge instructions/AVS in [facility]). Ask patient if they understand these. Do they have any questions? Reviewed, understanding is stated, no questions at this time       Were home services ordered (nursing, PT, OT, ST, etc.)? [facility], , PT     If so, has the first visit occurred? If not, why? (Assist with coordination of services if necessary.)   [facility] first home visit 1/27/2020   Was any DME ordered? No   If so, has it been received? If not, why?  (Assist patient in obtaining DME orders &/or equipment if necessary.) N/A   Complete a review of all medications (new, continued and discontinued meds per the D/C instructions and medication tab in [facility]). Completed     Were all new prescriptions filled? If not, why?  (Assist patient in obtaining medications if necessary  escalate for CCM &/or SW if ongoing issues are verbalized by pt or anticipated)   No med changes   Does the patient understand the purpose and dosing instructions for all medications?   (If patient has questions, provide explanation and education.)   Yes      Does the patient have any problems in performing ADLs? (If patient is unable to perform ADLs  what is the limiting factor(s)? Do they have a support system that can assist? If no support system is present, discuss possible assistance that they may be able to obtain. Escalate for CCM/SW if ongoing issues are verbalized by pt or anticipated)   Independent with ADLs at baseline. Does the patient have all follow-up appointments scheduled? 7 day f/up with PCP?   (f/up with PCP may be w/in 14 days if patient has a f/up with their specialist w/in 7 days)    7-14 day f/up with specialist?   (or per discharge instructions)    If f/up has not been made  what actions has the care coordinator made to accomplish this? Has transportation been arranged? Yes        Dr. Shaneka Baugh. 2/13/2020        Dr. Manoj Mas 1/31/2020              Yes, no transportation needs at this time. Any other questions or concerns expressed by the patient? No other needs or concerns identified. Contact information for Care Coordinator was given, instructed to call with new questions or concerns. Schedule next appointment with ANTON Wheatley or refer to RN Case Manager/ per the workflow guidelines. When is care coordinators next follow-up call scheduled? If referred for CCM  what RN care manager was the referral assigned? Care Coordinator will follow per workflow guidelines.           Within 14days   LACEY Call Completed By: Scott Rachel LPN  Care Coordinator

## 2020-01-28 VITALS
RESPIRATION RATE: 18 BRPM | HEART RATE: 64 BPM | OXYGEN SATURATION: 94 % | SYSTOLIC BLOOD PRESSURE: 150 MMHG | DIASTOLIC BLOOD PRESSURE: 72 MMHG | TEMPERATURE: 97.4 F

## 2020-01-28 PROCEDURE — 3331090002 HH PPS REVENUE DEBIT

## 2020-01-28 PROCEDURE — 3331090001 HH PPS REVENUE CREDIT

## 2020-01-29 ENCOUNTER — HOME CARE VISIT (OUTPATIENT)
Dept: SCHEDULING | Facility: HOME HEALTH | Age: 70
End: 2020-01-29
Payer: MEDICARE

## 2020-01-29 VITALS
DIASTOLIC BLOOD PRESSURE: 80 MMHG | RESPIRATION RATE: 17 BRPM | HEART RATE: 84 BPM | SYSTOLIC BLOOD PRESSURE: 132 MMHG | TEMPERATURE: 97 F

## 2020-01-29 PROCEDURE — 3331090001 HH PPS REVENUE CREDIT

## 2020-01-29 PROCEDURE — 3331090002 HH PPS REVENUE DEBIT

## 2020-01-29 PROCEDURE — G0157 HHC PT ASSISTANT EA 15: HCPCS

## 2020-01-29 NOTE — Clinical Note
Assisted patient with making reschedule appointment for power/scooter wheelchair fitting. Patient had appointment on 1.21.20 but was unable to find the [address] facility. New appointment is scheduled for 3. 3.20 (first available) at 1245pm with Chaitanya Méndez PT at [address] [facility]. Address is [place] [address] [address] [facility] Phone number is [phone]. Patient and spouse voice understanding of location of this address.

## 2020-01-30 ENCOUNTER — HOME CARE VISIT (OUTPATIENT)
Dept: SCHEDULING | Facility: HOME HEALTH | Age: 70
End: 2020-01-30
Payer: MEDICARE

## 2020-01-30 VITALS
SYSTOLIC BLOOD PRESSURE: 114 MMHG | HEART RATE: 62 BPM | DIASTOLIC BLOOD PRESSURE: 76 MMHG | TEMPERATURE: 96.8 F | RESPIRATION RATE: 18 BRPM | OXYGEN SATURATION: 98 %

## 2020-01-30 PROCEDURE — G0299 HHS/HOSPICE OF RN EA 15 MIN: HCPCS

## 2020-01-30 PROCEDURE — 3331090002 HH PPS REVENUE DEBIT

## 2020-01-30 PROCEDURE — 3331090001 HH PPS REVENUE CREDIT

## 2020-01-31 PROCEDURE — 3331090002 HH PPS REVENUE DEBIT

## 2020-01-31 PROCEDURE — 3331090001 HH PPS REVENUE CREDIT

## 2020-02-01 PROCEDURE — 3331090001 HH PPS REVENUE CREDIT

## 2020-02-01 PROCEDURE — 3331090002 HH PPS REVENUE DEBIT

## 2020-02-02 PROCEDURE — 3331090002 HH PPS REVENUE DEBIT

## 2020-02-02 PROCEDURE — 3331090001 HH PPS REVENUE CREDIT

## 2020-02-03 PROCEDURE — 3331090001 HH PPS REVENUE CREDIT

## 2020-02-03 PROCEDURE — 3331090002 HH PPS REVENUE DEBIT

## 2020-02-04 ENCOUNTER — HOME CARE VISIT (OUTPATIENT)
Dept: SCHEDULING | Facility: HOME HEALTH | Age: 70
End: 2020-02-04
Payer: MEDICARE

## 2020-02-04 PROCEDURE — 3331090002 HH PPS REVENUE DEBIT

## 2020-02-04 PROCEDURE — G0299 HHS/HOSPICE OF RN EA 15 MIN: HCPCS

## 2020-02-04 PROCEDURE — 3331090001 HH PPS REVENUE CREDIT

## 2020-02-05 ENCOUNTER — HOME CARE VISIT (OUTPATIENT)
Dept: SCHEDULING | Facility: HOME HEALTH | Age: 70
End: 2020-02-05
Payer: MEDICARE

## 2020-02-05 VITALS
HEART RATE: 60 BPM | SYSTOLIC BLOOD PRESSURE: 138 MMHG | DIASTOLIC BLOOD PRESSURE: 80 MMHG | TEMPERATURE: 98 F | OXYGEN SATURATION: 95 % | RESPIRATION RATE: 16 BRPM

## 2020-02-05 PROCEDURE — G0157 HHC PT ASSISTANT EA 15: HCPCS

## 2020-02-05 PROCEDURE — 3331090002 HH PPS REVENUE DEBIT

## 2020-02-05 PROCEDURE — 3331090001 HH PPS REVENUE CREDIT

## 2020-02-06 VITALS
HEART RATE: 62 BPM | TEMPERATURE: 98.3 F | OXYGEN SATURATION: 95 % | SYSTOLIC BLOOD PRESSURE: 140 MMHG | DIASTOLIC BLOOD PRESSURE: 90 MMHG | RESPIRATION RATE: 18 BRPM

## 2020-02-06 PROCEDURE — 3331090002 HH PPS REVENUE DEBIT

## 2020-02-06 PROCEDURE — 3331090001 HH PPS REVENUE CREDIT

## 2020-02-07 ENCOUNTER — HOME CARE VISIT (OUTPATIENT)
Dept: HOME HEALTH SERVICES | Facility: HOME HEALTH | Age: 70
End: 2020-02-07
Payer: MEDICARE

## 2020-02-07 ENCOUNTER — HOME CARE VISIT (OUTPATIENT)
Dept: SCHEDULING | Facility: HOME HEALTH | Age: 70
End: 2020-02-07
Payer: MEDICARE

## 2020-02-07 ENCOUNTER — PATIENT OUTREACH (OUTPATIENT)
Dept: CASE MANAGEMENT | Age: 70
End: 2020-02-07

## 2020-02-07 PROCEDURE — 3331090002 HH PPS REVENUE DEBIT

## 2020-02-07 PROCEDURE — G0157 HHC PT ASSISTANT EA 15: HCPCS

## 2020-02-07 PROCEDURE — 3331090001 HH PPS REVENUE CREDIT

## 2020-02-07 NOTE — PROGRESS NOTES
This note will not be viewable in [address] Transitions of Care  Follow up Outreach Note   Outreach type Phone call: spoke with patient  Home visit:   Date/Time of Outreach: 02/07/2020 2:07pm     Has patient attended PCP or specialist follow-up appointments since last contact? What was outcome of appointment? When is next follow-up scheduled? Patient states doing \"fantastic\". Patient reports a f/u appointment with Dr. Jacky Marion. Ashtyn Pepper on 1/31/2020 and another appointment with Dr. Ashtyn Pepper 2/7/2020 and the staples were removed. Patient states upcoming appointment with PCP Dr. Elham Olmstead on 2/13/2020   Review medications. Any medication changes since last outreach? Does patient have any questions or issues related to their medications? None stated      Not at this time. Home health active? If yes  any issue? Progress? Yes, [facility] SN, PT is still active     Referrals needed?  (CM, SW, HH, etc. )   No    Other issues/Miscellaneous? (Transportation, access to meals, ability to perform ADLs, adequate caregiver support, etc.) No other needs or concerns at this time. Patient states his gratitude for follow up. Next Outreach Scheduled?     Graduation from program?   N/A    Yes        Next Steps/Goals (if applicable):   N/A     Outreach completed by:   Mehdi Moore LPN  Care Coordinator

## 2020-02-08 PROCEDURE — 3331090002 HH PPS REVENUE DEBIT

## 2020-02-08 PROCEDURE — 3331090001 HH PPS REVENUE CREDIT

## 2020-02-09 PROCEDURE — 3331090002 HH PPS REVENUE DEBIT

## 2020-02-09 PROCEDURE — 3331090001 HH PPS REVENUE CREDIT

## 2020-02-10 PROCEDURE — 3331090001 HH PPS REVENUE CREDIT

## 2020-02-10 PROCEDURE — 3331090002 HH PPS REVENUE DEBIT

## 2020-02-11 ENCOUNTER — HOME CARE VISIT (OUTPATIENT)
Dept: SCHEDULING | Facility: HOME HEALTH | Age: 70
End: 2020-02-11
Payer: MEDICARE

## 2020-02-11 PROCEDURE — 3331090002 HH PPS REVENUE DEBIT

## 2020-02-11 PROCEDURE — G0299 HHS/HOSPICE OF RN EA 15 MIN: HCPCS

## 2020-02-11 PROCEDURE — 3331090001 HH PPS REVENUE CREDIT

## 2020-02-12 ENCOUNTER — HOME CARE VISIT (OUTPATIENT)
Dept: SCHEDULING | Facility: HOME HEALTH | Age: 70
End: 2020-02-12
Payer: MEDICARE

## 2020-02-12 VITALS
TEMPERATURE: 97 F | RESPIRATION RATE: 16 BRPM | HEART RATE: 60 BPM | SYSTOLIC BLOOD PRESSURE: 120 MMHG | DIASTOLIC BLOOD PRESSURE: 80 MMHG

## 2020-02-12 PROCEDURE — G0157 HHC PT ASSISTANT EA 15: HCPCS

## 2020-02-12 PROCEDURE — 3331090001 HH PPS REVENUE CREDIT

## 2020-02-12 PROCEDURE — 3331090002 HH PPS REVENUE DEBIT

## 2020-02-13 PROCEDURE — 3331090001 HH PPS REVENUE CREDIT

## 2020-02-13 PROCEDURE — 3331090002 HH PPS REVENUE DEBIT

## 2020-02-14 ENCOUNTER — HOME CARE VISIT (OUTPATIENT)
Dept: SCHEDULING | Facility: HOME HEALTH | Age: 70
End: 2020-02-14
Payer: MEDICARE

## 2020-02-14 PROCEDURE — 3331090002 HH PPS REVENUE DEBIT

## 2020-02-14 PROCEDURE — 3331090001 HH PPS REVENUE CREDIT

## 2020-02-14 PROCEDURE — G0299 HHS/HOSPICE OF RN EA 15 MIN: HCPCS

## 2020-02-15 PROCEDURE — 3331090001 HH PPS REVENUE CREDIT

## 2020-02-15 PROCEDURE — 3331090002 HH PPS REVENUE DEBIT

## 2020-02-16 VITALS
SYSTOLIC BLOOD PRESSURE: 140 MMHG | TEMPERATURE: 98 F | RESPIRATION RATE: 16 BRPM | TEMPERATURE: 97.5 F | HEART RATE: 64 BPM | OXYGEN SATURATION: 98 % | OXYGEN SATURATION: 97 % | SYSTOLIC BLOOD PRESSURE: 120 MMHG | DIASTOLIC BLOOD PRESSURE: 74 MMHG | RESPIRATION RATE: 16 BRPM | HEART RATE: 64 BPM | DIASTOLIC BLOOD PRESSURE: 80 MMHG

## 2020-02-16 PROCEDURE — 3331090002 HH PPS REVENUE DEBIT

## 2020-02-16 PROCEDURE — 3331090001 HH PPS REVENUE CREDIT

## 2020-02-17 PROCEDURE — 3331090001 HH PPS REVENUE CREDIT

## 2020-02-17 PROCEDURE — 3331090002 HH PPS REVENUE DEBIT

## 2020-02-18 ENCOUNTER — HOME CARE VISIT (OUTPATIENT)
Dept: SCHEDULING | Facility: HOME HEALTH | Age: 70
End: 2020-02-18
Payer: MEDICARE

## 2020-02-18 VITALS
TEMPERATURE: 97.3 F | DIASTOLIC BLOOD PRESSURE: 82 MMHG | RESPIRATION RATE: 16 BRPM | SYSTOLIC BLOOD PRESSURE: 132 MMHG | OXYGEN SATURATION: 97 % | HEART RATE: 70 BPM

## 2020-02-18 VITALS — DIASTOLIC BLOOD PRESSURE: 78 MMHG | SYSTOLIC BLOOD PRESSURE: 134 MMHG | RESPIRATION RATE: 17 BRPM | HEART RATE: 1 BPM

## 2020-02-18 PROCEDURE — G0151 HHCP-SERV OF PT,EA 15 MIN: HCPCS

## 2020-02-18 PROCEDURE — 3331090002 HH PPS REVENUE DEBIT

## 2020-02-18 PROCEDURE — G0299 HHS/HOSPICE OF RN EA 15 MIN: HCPCS

## 2020-02-18 PROCEDURE — 3331090001 HH PPS REVENUE CREDIT

## 2020-02-19 PROCEDURE — 3331090001 HH PPS REVENUE CREDIT

## 2020-02-19 PROCEDURE — 3331090002 HH PPS REVENUE DEBIT

## 2020-02-20 PROCEDURE — 3331090002 HH PPS REVENUE DEBIT

## 2020-02-20 PROCEDURE — 3331090001 HH PPS REVENUE CREDIT

## 2020-02-21 ENCOUNTER — HOME CARE VISIT (OUTPATIENT)
Dept: SCHEDULING | Facility: HOME HEALTH | Age: 70
End: 2020-02-21
Payer: MEDICARE

## 2020-02-21 VITALS
RESPIRATION RATE: 16 BRPM | HEART RATE: 74 BPM | OXYGEN SATURATION: 98 % | SYSTOLIC BLOOD PRESSURE: 124 MMHG | TEMPERATURE: 98.2 F | DIASTOLIC BLOOD PRESSURE: 72 MMHG

## 2020-02-21 PROCEDURE — 3331090001 HH PPS REVENUE CREDIT

## 2020-02-21 PROCEDURE — 3331090002 HH PPS REVENUE DEBIT

## 2020-02-21 PROCEDURE — G0299 HHS/HOSPICE OF RN EA 15 MIN: HCPCS

## 2020-02-22 PROCEDURE — 3331090001 HH PPS REVENUE CREDIT

## 2020-02-22 PROCEDURE — 3331090002 HH PPS REVENUE DEBIT

## 2020-02-23 PROCEDURE — 3331090002 HH PPS REVENUE DEBIT

## 2020-02-23 PROCEDURE — 3331090001 HH PPS REVENUE CREDIT

## 2020-02-24 PROCEDURE — 3331090002 HH PPS REVENUE DEBIT

## 2020-02-24 PROCEDURE — 3331090001 HH PPS REVENUE CREDIT

## 2020-02-25 ENCOUNTER — HOME CARE VISIT (OUTPATIENT)
Dept: SCHEDULING | Facility: HOME HEALTH | Age: 70
End: 2020-02-25
Payer: MEDICARE

## 2020-02-25 VITALS
SYSTOLIC BLOOD PRESSURE: 122 MMHG | DIASTOLIC BLOOD PRESSURE: 78 MMHG | OXYGEN SATURATION: 99 % | RESPIRATION RATE: 18 BRPM | HEART RATE: 78 BPM | TEMPERATURE: 97.9 F

## 2020-02-25 PROCEDURE — G0299 HHS/HOSPICE OF RN EA 15 MIN: HCPCS

## 2020-02-25 PROCEDURE — 3331090002 HH PPS REVENUE DEBIT

## 2020-02-25 PROCEDURE — 3331090001 HH PPS REVENUE CREDIT

## 2020-02-26 PROCEDURE — 3331090001 HH PPS REVENUE CREDIT

## 2020-02-26 PROCEDURE — 3331090002 HH PPS REVENUE DEBIT

## 2020-02-27 PROCEDURE — 3331090001 HH PPS REVENUE CREDIT

## 2020-02-27 PROCEDURE — 3331090002 HH PPS REVENUE DEBIT

## 2020-02-28 PROCEDURE — 3331090002 HH PPS REVENUE DEBIT

## 2020-02-28 PROCEDURE — 3331090001 HH PPS REVENUE CREDIT

## 2020-02-29 PROCEDURE — 3331090002 HH PPS REVENUE DEBIT

## 2020-02-29 PROCEDURE — 3331090001 HH PPS REVENUE CREDIT

## 2020-03-01 PROCEDURE — 3331090002 HH PPS REVENUE DEBIT

## 2020-03-01 PROCEDURE — 3331090001 HH PPS REVENUE CREDIT

## 2020-03-02 PROCEDURE — 3331090001 HH PPS REVENUE CREDIT

## 2020-03-02 PROCEDURE — 3331090002 HH PPS REVENUE DEBIT

## 2020-03-03 ENCOUNTER — HOSPITAL ENCOUNTER (OUTPATIENT)
Dept: PHYSICAL THERAPY | Age: 70
Discharge: HOME | End: 2020-03-03
Attending: FAMILY MEDICINE
Payer: MEDICARE

## 2020-03-03 ENCOUNTER — HOME CARE VISIT (OUTPATIENT)
Dept: SCHEDULING | Facility: HOME HEALTH | Age: 70
End: 2020-03-03
Payer: MEDICARE

## 2020-03-03 PROCEDURE — 97165 OT EVAL LOW COMPLEX 30 MIN: CPT

## 2020-03-03 PROCEDURE — 400013 HH SOC

## 2020-03-03 PROCEDURE — 3331090003 HH PPS REVENUE ADJ

## 2020-03-03 PROCEDURE — G0299 HHS/HOSPICE OF RN EA 15 MIN: HCPCS

## 2020-03-03 PROCEDURE — 3331090002 HH PPS REVENUE DEBIT

## 2020-03-03 PROCEDURE — 3331090001 HH PPS REVENUE CREDIT

## 2020-03-03 PROCEDURE — 97542 WHEELCHAIR MNGMENT TRAINING: CPT

## 2020-03-03 NOTE — THERAPY EVALUATION
Ana Rosa Shadow  : 1950  Primary: [place] [employer] Hmo  Secondary:  [address] [place]  at [address] [facility] [address], [address] [facility], [place] [address] [address] [facility]  [phone]   [phone]           OCCUPATIONAL THERAPY WHEELCHAIR SEATING AND POSITIONING NOTE: Initial Assessment, Discharge and Treatment Day: 1st 3/3/2020   ICD-10: Treatment Diagnosis: Repeated falls (R29.6); History of falling (Z91.81); Low back pain (M54.5)    Precautions/Allergies:   Bactrim [sulfamethoprim]; Glipizide; Morphine; and Xanax [alprazolam]   MD Orders: OT evaluate and treat for w/c seating and mobility assessment. MEDICAL/REFERRING DIAGNOSIS:   Other specified health status [Z78.9]   DATE OF ONSET:  Insidious over last 5 years. REFERRING PHYSICIAN: Robert Dominguez MD  RETURN PHYSICIAN APPOINTMENT: Pending. INTERDISCIPLINARY COLLABORATION: OT and ATP (with [employer]). INITIAL ASSESSMENT:Mr. Mick Tobias presents with a history of frequent falls with a history of lower back pain, diabetes, neuropathy,  ischemic cardiomyopathy, coronary artery disease and recent L LE arterial bypass. He relays a history of frequent falls due to tripping over things (as he can not feel his feet) with one recent fall hitting his head. He would like a power scooter to be able to get around his home easier and without fatigue in order to complete mobility related activities of daily living more independently such as going from room to room and going to the kitchen to cook and eat. Today, Mr. Mick Tobias completed the timed up and go in 14 seconds utilizing a rolling walker with contact guard/minimal assistance (The test measures, in seconds, the time taken by an individual to stand up from a standard arm chair (seat height 46 cm [18 in], arm height 65 cm [25.6 in]), walk a distance of 3 meters (118 in, approx 10 ft), turn, walk back to the chair and sit down.   If the individual takes longer than 14 seconds to complete TUG, this indicates risk for falls. ). Mr. Chela Tan also completed the six minute walk test walking only 300 feet in 3 minutes and 15 seconds with a straight cane prior to needing a seated rest break (complained of his hip hurting and fatigue). Normal range varies but is approximately 9801-8122 Feet. Mr. Chela Tan is considered a borderline fall risk based on this assessment and no devices including canes, walkers, or rollators would improve his efficiency or ability to complete mobility related activities of daily living. Mr. Chela Tan also attempted to propel an ultra lightweight manual wheelchair forward on a hard surface pushing it 10 meters in 14 and 13 seconds respectively ( 0.83 m/s; 1.2 m/s is considered a safe speed for crossing a cross walk) prior to needing a rest break. This is neither safe or effective mode for completing mobility related activities of daily living and no manual wheelchair (i.e. Standard, lightweight, high strength lightweight, or ultra lightweight) would improve his independence with these. Mr. Chela Tan did trial driving a power wheelchair in an indoor environment requiring demonstrating adequate problem solving, visual attention and reaction time to operate a power mobility device safely in his home environment. A four wheeled power scooter is recommended for Mr. Schmid to more independently complete mobility related activities of daily livingsuch as going from the living room to the kitchen to eat. PLAN OF CARE:   PROBLEM LIST:  1. Decreased Strength  2. Decreased ADL/Functional Activities  3. Decreased Transfer Abilities  4. Decreased Ambulation Ability/Technique  5. Decreased Balance  6. Increased Pain  7. Decreased Activity Tolerance  8. Decreased Flexibility/Joint Mobility INTERVENTIONS PLANNED:  1. Wheelchair management   TREATMENT PLAN:  Effective Dates: 3/3/2020 TO 3/2/2020. Frequency/Duration: Today's assessment/treatement only.    DISCHARGE GOALS: (Goals have been discussed and agreed upon with patient.): Time Frame: 1 visit. 1. Mr. Arben Cooper will trial a power wheelchair demonstrating good visual attention, visual-perception, command following, problem solving, reaction time, and activity tolerance as needed to operate a power wheelchair in an indoor setting. Met.   EQUIPMENT RECOMMENDATIONS:   1. Power scooter (Four wheel) due to difficulty walking with any device or propelling any manual wheelchair to complete mobility related activities of daily living. Regarding Mr. Schmid's therapy, I certify that the treatment plan above will be carried out by a therapist or under their direction. Thank you for this referral,  Kiko Saenz, OTR/L     Referring Physician Signature: Betty Castillo MD_________________________  Date _________            The information in this section was collected on 3/4/2020   (except where otherwise noted). OCCUPATIONAL PROFILE & HISTORY:   History of Present Injury/Illness (Reason for Referral):  Gui Gonzalez is s/p left SFA to below-knee tibial bypass. He states he lost the feeling in his feet in 2014. He states the numbness has gotten progressively worse and has come up to his knees. He states he has been falling frequently - one time hitting his head. He most recently fell last week. He states he was seen by home health nursing this morning for his postsurgical incision - was discharged today per patient. Danielle Cardenas He takes metformin pills due to diabetes. He normally walks with a single point cane that he's had since his surgery. He states he sees a pain doctor for his back. He states when he went to the store he stumbled and fell in the parking lot about 6 months ago. States he used to do more cooking because he can no longer stand up to do it.    Past Medical History/Comorbidities:   Mr. Arben Cooper  has a past medical history of Aneurysm ([facility] [address] Benign hypertensive heart disease without CHF (6/22/2015), CAD (coronary artery disease), Diabetes ([address] Edema, Hypertension, Ischemic cardiomyopathy, and Other diseases of lung, not elsewhere classified. Mr. Csaimiro Mccormack  has a past surgical history that includes hx lumbar fusion (2009); hx coronary stent placement; hx heart catheterization (2007 and 2008); hx colonoscopy; vascular surgery procedure unlist (10/2013); and vascular surgery procedure unlist (01/23/2020). Social History/Living Environment:     Patient states he smokes. States he has a \"tremendously big\" kitchen. Prior Level of Function/Work/Activity: Lives with his wife and 12 children (ages from 3 - 12) one story home. He states he doesn't have a ramp, but can get one built. He states he has difficulty getting in/out of his greater difficulty. 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 Vocation: Used to work as a . Caregiver: Activity Level:   Dominant Side:         RIGHT  Previous Treatment Approaches:          Home health PT - states they helped him get the use of his left leg back. Current Medications:    Current Outpatient Medications:     clopidogreL (PLAVIX) 75 mg tab, Take 1 Tab by mouth daily. , Disp: 90 Tab, Rfl: 3    lisinopril-hydroCHLOROthiazide (PRINZIDE, ZESTORETIC) 20-25 mg per tablet, Take 1 Tab by mouth daily. , Disp: 90 Tab, Rfl: 3    metoprolol succinate (TOPROL-XL) 50 mg XL tablet, Take 1 Tab by mouth daily. , Disp: 90 Tab, Rfl: 3    pravastatin (PRAVACHOL) 40 mg tablet, Take 1 Tab by mouth nightly., Disp: 90 Tab, Rfl: 3    metFORMIN (GLUCOPHAGE) 1,000 mg tablet, Take 1 Tab by mouth two (2) times daily (with meals). , Disp: 180 Tab, Rfl: 3    gabapentin (NEURONTIN) 600 mg tablet, 2 tablets po three times daily  Indications: neuropathic pain, Disp: 540 Tab, Rfl: 5    metFORMIN (GLUCOPHAGE) 500 mg tablet, Take one tablet in am added to the 1000mg tablet =1500 mg in am only, Disp: 90 Tab, Rfl: 3    rivaroxaban (XARELTO) 2.5 mg tablet, Take 2.5 mg by mouth two (2) times a day., Disp: , Rfl:     aspirin delayed-release 81 mg tablet, Take 81 mg by mouth daily. Start 1/3/19 and take while Plavix is being held. Will stop aspirin once Plavix is restarted Postoperatively, Disp: , Rfl:     oxyCODONE IR (ROXICODONE) 30 mg immediate release tablet, Take 30 mg by mouth three (3) times daily as needed for Pain., Disp: , Rfl:     miscellaneous medical supply misc, Handicap Placard - Use for poor ambulation, Disp: 1 Each, Rfl: 0    miscellaneous medical supply misc, Diabetic shoes - for uncontrolled diabetes, Icd-10 E- 11.8, peripheral neuropathy, left large toe, diabetic foot ulcer, Disp: 1 Each, Rfl: 0    miscellaneous medical supply misc, Glucose test meter, icd-10 E 11.8, check up to 3 times daily, Disp: 1 Each, Rfl: 0    miscellaneous medical supply misc, Glucose test strips - icd-10 E11.8  - test up to 3 times daily, Disp: 100 Each, Rfl: 11    miscellaneous medical supply misc, Lancets - use for up to 3 times daily glucose testing, icd-10 E 11.8, Disp: 100 Each, Rfl: 11    nitroglycerin (NITROSTAT) 0.4 mg SL tablet, 1 Tab by SubLINGual route every five (5) minutes as needed for Chest Pain., Disp: 1 Bottle, Rfl: 6            Date Last Reviewed: 3/3/2020    Complexity Level : Expanded review of therapy/medical records (1-2):  MODERATE COMPLEXITY   ASSESSMENT OF OCCUPATIONAL PERFORMANCE:   HEIGHT: 6'3\"; WEIGHT: 218 lbs   MENTAL STATUS:   Neurologic State: Alert   Orientation Level: Oriented to person, place, and situation. Cognition: Follows 2-3 step commands. Perception: Appears intact. Safety/Judgement:  Grossly intact. Understands/expresses information appropriately   Memory: Immediate Short Term Memory: Grossly intact. Long term: Grossly intact. VISION:  Identifies obstacles in visual field and appropriately navigates around objects in wheelchair. Corrective Lenses: States he has reading glasses.    BOWEL/BLADDER:  ADLs from General Assessment:       [facility] \"6 Clicks\"           Daily Activity Inpatient Short Form  How much help from another person does the patient currently need. .. Total A Lot A Little None   1. Putting on and taking off regular lower body clothing? [] 1   [] 2   [x] 3   [] 4   2. Bathing (including washing, rinsing, drying)? [] 1   [] 2   [] 3   [x] 4   3. Toileting, which includes using toilet, bedpan or urinal?   [] 1   [] 2   [] 3   [x] 4   4. Putting on and taking off regular upper body clothing? [] 1   [] 2   [] 3   [x] 4   5. Taking care of personal grooming such as brushing teeth? [] 1   [] 2   [] 3   [x] 4   6. Eating meals? [] 1   [] 2   [x] 3   [] 4   © 2007, Trustees [place] [id number], under license to [employer]. All rights reserved     Score:  Initial: 22 Most Recent: X (Date: -- )   Interpretation of Tool:  Represents clinically-significant functional categories (i.e.Activities of daily living). MAT EVALUATION:   Measurements in sitting (in collaboration with  - see  note for details). Hip width: 20  Top of head: 34  Top of shoulder: 23  Upper leg length:  22  Lower leg length: 21  Sitting Posture:  Pelvis  Anterior/Posterior Pelvic Tilt: Sacral sits. Pelvic Obliquity: No overt deformities noted. Pelvic Rotation:  No overt deformities noted. Spine   Thoracic spine: No overt deformities noted. Lumbar spine: Lumbar spine flexed with decreased flexibility. Lateral trunk: No overt deformities noted. Upper Extremities   Shoulder symmetry: Shoulders protracted. Lower Extremities  Hip position: Hips abducted. Knees: No overt deformities noted. Ankle/Feet: No overt deformities noted. Head/neck: No overt deformities noted. SKIN INTEGRITY: No reported wounds on seated surface. SENSATION: Light Touch Discrimination: Impaired from knees distally bilaterally. STRENGTH:   LUE Strength/ROM (General):    Shoulder Elevation: Flexes to at least 100.  Shoulder IR/ER: Grossly intact. Elbow Flex/Extension: Full. :Full RUE Strength/ROM (General): Shoulder Elevation: Flexes to at least 100. Shoulder IR/ER: Grossly intact. Elbow Flex/Extension: Full. : Full. LLE Strength/ROM (General): Hip Flexion against gravity:  Yes  Hip Abd/Adduction against resistance: Yes  Knee Flex/Extension against gravity: Yes  Ankle Dorsi/Plantar Flexion against gravity: Yes RLE Strength/ROM (General): Hip Flexion against gravity:  Yes  Hip Abd/Adduction against resistance: Yes  Knee Flex/Extension against gravity: Yes  Ankle Dorsi/Plantar Flexion against gravity: Yes   BALANCE:   Seated (Static): Good. Seated (Dynamic): Good. Standing (Static): Fair. Standing (Dynamic): Poor to fair. FUNCTIONAL MOBILITY:   Transfers:   Chair to Mat:   Sit to Stand:   Supine to sit:   Sit to supine:   Seated weight-shifts:  Wheelchair Propulsion:  Wheelchair propulsion (on rubber surface in straight line forward): Propelled ultra lightweight manual wheelchair 10 meters in 12 seconds utilizing arc propulsion pattern   (12 pushes) (0.83 m/s). Outcome Measure: Tool Used: 6-MINUTE WALK TEST  Score:  Initial: 300 feet 3 minutes 15 seconds Most Recent: X feet (Date: -- )   Interpretation of Score: Normal range varies but is approximately 0920-4693 Feet      Distance walked: 300 feet               Baseline End of Test   Heart Rate 66 79   SpO2 95 93     Tool Used: Lower Extremity Functional Scale (LEFS)  Score:  Initial: 12/80 Most Recent: X/80 (Date: -- )   Interpretation of Score: 20 questions each scored on a 5 point scale with 0 representing \"extreme difficulty or unable to perform\" and 4 representing \"no difficulty\". The lower the score, the greater the functional disability. 80/80 represents no disability. Minimal detectable change is 9 points.     Tool Used: Timed Up and Go (TUG)  Score:  Initial: 14 seconds Most Recent: X seconds (Date: -- )   Interpretation of Score: The test measures, in seconds, the time taken by an individual to stand up from a standard arm chair (seat height 46 cm [18 in], arm height 65 cm [25.6 in]), walk a distance of 3 meters (118 in, approx 10 ft), turn, walk back to the chair and sit down. If the individual takes longer than 14 seconds to complete TUG, this indicates risk for falls. Physical Skills Involved:  1. Range of Motion  2. Balance  3. Strength  4. Activity Tolerance  5. Sensation  6. Fine Motor Control  7. Gross Motor Control Cognitive Skills Affected (resulting in the inability to perform in a timely and safe manner):  1. No overt deficits noted Psychosocial Skills Affected:  1. Habits/Routines  2. Environmental Adaptation  3. Social Roles   Number of elements that affect the Plan of Care: 1-3:  LOW COMPLEXITY   CLINICAL DECISION MAKING:     Medical Necessity:   · Today's assessment/treatment only. .  Reason for Services/Other Comments:  · Today's assessment/treatment only. .  Assessment Modification and Need for Assistance:  1. No modification or assistance (Low)     Assessment process, impact of co-morbidities, assessment modification\need for assistance, and selection of interventions: Analytical Complexity:LOW COMPLEXITY   TREATMENT:   (In addition to Assessment/Re-Assessment sessions the following treatments were rendered)    Pre-treatment Symptoms/Complaints:    Pain: Initial:     (history of lower back pain)/10     Post Session:    same/10   Assessment: 40 minutes  In addition to assessment the below treatment deemed medically necessary:  Wheelchair Management and Training: ( 40 minutes minutes): Procedure(s) utilized to improve and/or restore functioning as related to power wheelchair mobility. Required minimal visual and verbal cueing to facilitate ability to navigate trial power and/or manual wheelchair through multiple environments as would be expected in a home or community environment.    Equipment Trial (use \".outdoor\" to document outdoor training):   INDOOR TRAINING:  [x] YES [] NO Cause and effect concepts while in the wheelchair (i.e. Activating a switch causes the wheelchair to move)   [x] YES [] NO Stop and go concepts while in the wheelchair (i.e. \"stop and go\" verbal instructions, or consistently stopping for objects)   [x] YES [] NO Directional concepts while in the wheelchair (i.e. moving the joystick in different directions to move the wheelchair in different directions)   [x] YES [] NO Ability to follow directions: (i.e. Following varying verbal commands in a safe and timely manner)   [x] YES [] NO Adequate visual functioning to safely drive indoors (i.e. Visual attention to environment and ability to avoid obstacles)   [x] YES [] NO Adequate problem solving ability (i.e. Maneuver power wheelchair to designated destination without verbal cues)   [x] YES [] NO Ability to use access method with adequate activation, sustained contact and release (i.e. Able to access switch, control contact, and release when ready to stop wheelchair)   [x] YES [] NO Ability to change drives and or speeds as appropriate for environment (i.e. Decreasing speed in high traffic areas)   [x] YES [] NO Client ready to complete outdoor portion of power wheelchair mobility assessment   [x] YES [] NO Small space maneuverability (room)   [x] YES [] NO Large space maneuverability (hallway)   [x] YES [] NO Accessing Doorways   COMMENTS:        Treatment/Session Assessment:    Response to Treatment:  Tolerated well without complications. .  · Compliance with Program/Exercises: compliant today. · Recommendations/Intent for next treatment session: \"Next visit will focus on N/A\".   Total Treatment Duration:  OT Patient Time In/Time Out  Time In: 1310  Time Out: UMER Thomas, OTR/L

## 2020-03-04 PROCEDURE — 3331090002 HH PPS REVENUE DEBIT

## 2020-03-04 PROCEDURE — 3331090001 HH PPS REVENUE CREDIT

## 2020-03-05 VITALS
TEMPERATURE: 96.1 F | SYSTOLIC BLOOD PRESSURE: 148 MMHG | HEART RATE: 64 BPM | DIASTOLIC BLOOD PRESSURE: 86 MMHG | RESPIRATION RATE: 16 BRPM | OXYGEN SATURATION: 98 %

## 2020-03-05 PROCEDURE — 3331090002 HH PPS REVENUE DEBIT

## 2020-03-05 PROCEDURE — 3331090001 HH PPS REVENUE CREDIT

## 2020-10-05 ENCOUNTER — APPOINTMENT (RX ONLY)
Dept: URBAN - METROPOLITAN AREA CLINIC 349 | Facility: CLINIC | Age: 70
Setting detail: DERMATOLOGY
End: 2020-10-05

## 2020-10-05 DIAGNOSIS — L57.8 OTHER SKIN CHANGES DUE TO CHRONIC EXPOSURE TO NONIONIZING RADIATION: ICD-10-CM

## 2020-10-05 DIAGNOSIS — L40.0 PSORIASIS VULGARIS: ICD-10-CM

## 2020-10-05 DIAGNOSIS — L57.0 ACTINIC KERATOSIS: ICD-10-CM

## 2020-10-05 DIAGNOSIS — L82.1 OTHER SEBORRHEIC KERATOSIS: ICD-10-CM

## 2020-10-05 PROCEDURE — ? LIQUID NITROGEN

## 2020-10-05 PROCEDURE — ? COUNSELING

## 2020-10-05 PROCEDURE — 99202 OFFICE O/P NEW SF 15 MIN: CPT | Mod: 25

## 2020-10-05 PROCEDURE — ? PRESCRIPTION

## 2020-10-05 PROCEDURE — ? ORDER TESTS

## 2020-10-05 PROCEDURE — 17003 DESTRUCT PREMALG LES 2-14: CPT

## 2020-10-05 PROCEDURE — ? TREATMENT REGIMEN

## 2020-10-05 PROCEDURE — 17000 DESTRUCT PREMALG LESION: CPT

## 2020-10-05 RX ORDER — TILDRAKIZUMAB-ASMN 100 MG/ML
INJECTION, SOLUTION SUBCUTANEOUS
Qty: 2 | Refills: 4

## 2020-10-05 ASSESSMENT — LOCATION DETAILED DESCRIPTION DERM
LOCATION DETAILED: LEFT DISTAL DORSAL FOREARM
LOCATION DETAILED: SUPRAPUBIC SKIN
LOCATION DETAILED: RIGHT SUPERIOR MEDIAL LOWER BACK
LOCATION DETAILED: LEFT DISTAL DORSAL FOREARM
LOCATION DETAILED: SUPERIOR LUMBAR SPINE
LOCATION DETAILED: RIGHT PROXIMAL DORSAL FOREARM
LOCATION DETAILED: LEFT PROXIMAL DORSAL FOREARM
LOCATION DETAILED: LEFT MEDIAL FOREHEAD
LOCATION DETAILED: LEFT PROXIMAL DORSAL FOREARM
LOCATION DETAILED: RIGHT DISTAL DORSAL FOREARM

## 2020-10-05 ASSESSMENT — LOCATION SIMPLE DESCRIPTION DERM
LOCATION SIMPLE: LEFT FOREHEAD
LOCATION SIMPLE: LEFT FOREARM
LOCATION SIMPLE: RIGHT FOREARM
LOCATION SIMPLE: RIGHT LOWER BACK
LOCATION SIMPLE: LEFT FOREARM
LOCATION SIMPLE: GROIN
LOCATION SIMPLE: LOWER BACK

## 2020-10-05 ASSESSMENT — LOCATION ZONE DERM
LOCATION ZONE: ARM
LOCATION ZONE: ARM
LOCATION ZONE: FACE
LOCATION ZONE: TRUNK

## 2020-10-05 NOTE — PROCEDURE: TREATMENT REGIMEN
Action 3: Continue
Other Instructions: Start paperwork for ilumya
Detail Level: Generalized
Start Regimen: Fluticasone ointment twice daily

## 2020-10-05 NOTE — HPI: RASH (PSORIASIS)
How Severe Is Your Psoriasis?: severe
Is This A New Presentation, Or A Follow-Up?: Psoriasis
Additional History: Patient wants to try Humira.

## 2020-11-05 ENCOUNTER — RX ONLY (OUTPATIENT)
Age: 70
Setting detail: RX ONLY
End: 2020-11-05

## 2020-11-05 RX ORDER — GUSELKUMAB 100 MG/ML
INJECTION SUBCUTANEOUS
Qty: 1 | Refills: 6 | Status: ERX

## 2020-11-05 RX ORDER — GUSELKUMAB 100 MG/ML
INJECTION SUBCUTANEOUS
Qty: 1 | Refills: 1 | Status: ERX

## 2021-08-30 ENCOUNTER — APPOINTMENT (RX ONLY)
Dept: URBAN - METROPOLITAN AREA CLINIC 349 | Facility: CLINIC | Age: 71
Setting detail: DERMATOLOGY
End: 2021-08-30

## 2021-08-30 DIAGNOSIS — L57.0 ACTINIC KERATOSIS: ICD-10-CM

## 2021-08-30 DIAGNOSIS — Z12.83 ENCOUNTER FOR SCREENING FOR MALIGNANT NEOPLASM OF SKIN: ICD-10-CM

## 2021-08-30 DIAGNOSIS — L57.8 OTHER SKIN CHANGES DUE TO CHRONIC EXPOSURE TO NONIONIZING RADIATION: ICD-10-CM

## 2021-08-30 DIAGNOSIS — L82.1 OTHER SEBORRHEIC KERATOSIS: ICD-10-CM

## 2021-08-30 DIAGNOSIS — Z85.828 PERSONAL HISTORY OF OTHER MALIGNANT NEOPLASM OF SKIN: ICD-10-CM

## 2021-08-30 PROCEDURE — ? COUNSELING

## 2021-08-30 PROCEDURE — 99213 OFFICE O/P EST LOW 20 MIN: CPT | Mod: 25

## 2021-08-30 PROCEDURE — ? LIQUID NITROGEN

## 2021-08-30 PROCEDURE — 17000 DESTRUCT PREMALG LESION: CPT

## 2021-08-30 PROCEDURE — 17003 DESTRUCT PREMALG LES 2-14: CPT

## 2021-08-30 ASSESSMENT — LOCATION SIMPLE DESCRIPTION DERM
LOCATION SIMPLE: LEFT FOREARM
LOCATION SIMPLE: UPPER BACK
LOCATION SIMPLE: LEFT WRIST
LOCATION SIMPLE: LEFT HAND
LOCATION SIMPLE: RIGHT HAND
LOCATION SIMPLE: CHEST
LOCATION SIMPLE: ABDOMEN
LOCATION SIMPLE: RIGHT FOREARM

## 2021-08-30 ASSESSMENT — LOCATION DETAILED DESCRIPTION DERM
LOCATION DETAILED: RIGHT DISTAL DORSAL FOREARM
LOCATION DETAILED: LEFT PROXIMAL DORSAL FOREARM
LOCATION DETAILED: LEFT DORSAL WRIST
LOCATION DETAILED: LEFT DISTAL ULNAR DORSAL FOREARM
LOCATION DETAILED: LEFT DISTAL DORSAL FOREARM
LOCATION DETAILED: LEFT ULNAR DORSAL HAND
LOCATION DETAILED: RIGHT ULNAR DORSAL HAND
LOCATION DETAILED: RIGHT MEDIAL INFERIOR CHEST
LOCATION DETAILED: STERNUM
LOCATION DETAILED: INFERIOR THORACIC SPINE
LOCATION DETAILED: RIGHT PROXIMAL RADIAL DORSAL FOREARM
LOCATION DETAILED: LEFT LATERAL DORSAL WRIST
LOCATION DETAILED: EPIGASTRIC SKIN

## 2021-08-30 ASSESSMENT — PAIN INTENSITY VAS: HOW INTENSE IS YOUR PAIN 0 BEING NO PAIN, 10 BEING THE MOST SEVERE PAIN POSSIBLE?: 1/10 PAIN

## 2021-08-30 ASSESSMENT — LOCATION ZONE DERM
LOCATION ZONE: HAND
LOCATION ZONE: TRUNK
LOCATION ZONE: ARM

## 2021-08-30 NOTE — PROCEDURE: LIQUID NITROGEN
Show Aperture Variable?: Yes
Render Post-Care Instructions In Note?: no
Post-Care Instructions: I reviewed with the patient in detail post-care instructions. Patient is to wear sunprotection, and avoid picking at any of the treated lesions. Pt may apply Vaseline to crusted or scabbing areas.
Number Of Freeze-Thaw Cycles: 2 freeze-thaw cycles
Detail Level: Detailed
Consent: The patient's consent was obtained including but not limited to risks of crusting, scabbing, blistering, scarring, darker or lighter pigmentary change, recurrence, incomplete removal and infection.
Duration Of Freeze Thaw-Cycle (Seconds): 3

## 2021-09-20 PROBLEM — L97.512 SKIN ULCER OF TOE OF RIGHT FOOT WITH FAT LAYER EXPOSED (HCC): Status: RESOLVED | Noted: 2019-06-18 | Resolved: 2021-09-20

## 2021-12-05 LAB
LEFT VENTRICULAR EJECTION FRACTION HIGH VALUE: 40 %
LEFT VENTRICULAR EJECTION FRACTION MODE: NORMAL
LV EF: 35 %

## 2021-12-08 PROBLEM — Z95.828 H/O EXTREMITY BYPASS GRAFT: Status: ACTIVE | Noted: 2021-12-08

## 2021-12-10 PROBLEM — I47.20 VENTRICULAR TACHYCARDIA: Status: ACTIVE | Noted: 2021-12-10

## 2021-12-13 ENCOUNTER — HOSPITAL ENCOUNTER (OUTPATIENT)
Dept: SURGERY | Age: 71
Discharge: HOME | End: 2021-12-13
Payer: MEDICARE

## 2021-12-13 VITALS
RESPIRATION RATE: 18 BRPM | DIASTOLIC BLOOD PRESSURE: 75 MMHG | TEMPERATURE: 97.7 F | SYSTOLIC BLOOD PRESSURE: 149 MMHG | HEART RATE: 93 BPM | OXYGEN SATURATION: 98 %

## 2021-12-13 LAB
ANION GAP SERPL CALC-SCNC: 4 MMOL/L (ref 7–16)
APTT PPP: 47.6 SEC (ref 24.1–35.1)
BASOPHILS # BLD: 0.1 K/UL (ref 0–0.2)
BASOPHILS NFR BLD: 1 % (ref 0–2)
BUN SERPL-MCNC: 18 MG/DL (ref 8–23)
CALCIUM SERPL-MCNC: 10.1 MG/DL (ref 8.3–10.4)
CHLORIDE SERPL-SCNC: 103 MMOL/L (ref 98–107)
CO2 SERPL-SCNC: 28 MMOL/L (ref 21–32)
CREAT SERPL-MCNC: 1.4 MG/DL (ref 0.8–1.5)
DIFFERENTIAL METHOD BLD: ABNORMAL
EOSINOPHIL # BLD: 0.3 K/UL (ref 0–0.8)
EOSINOPHIL NFR BLD: 4 % (ref 0.5–7.8)
ERYTHROCYTE [DISTWIDTH] IN BLOOD BY AUTOMATED COUNT: 18.7 % (ref 11.9–14.6)
GLUCOSE SERPL-MCNC: 214 MG/DL (ref 65–100)
HCT VFR BLD AUTO: 40.4 % (ref 41.1–50.3)
HGB BLD-MCNC: 12.3 G/DL (ref 13.6–17.2)
IMM GRANULOCYTES # BLD AUTO: 0 K/UL (ref 0–0.5)
IMM GRANULOCYTES NFR BLD AUTO: 1 % (ref 0–5)
INR PPP: 1.7
LYMPHOCYTES # BLD: 1.9 K/UL (ref 0.5–4.6)
LYMPHOCYTES NFR BLD: 25 % (ref 13–44)
MCH RBC QN AUTO: 25.6 PG (ref 26.1–32.9)
MCHC RBC AUTO-ENTMCNC: 30.4 G/DL (ref 31.4–35)
MCV RBC AUTO: 84 FL (ref 79.6–97.8)
MONOCYTES # BLD: 0.4 K/UL (ref 0.1–1.3)
MONOCYTES NFR BLD: 6 % (ref 4–12)
NEUTS SEG # BLD: 4.9 K/UL (ref 1.7–8.2)
NEUTS SEG NFR BLD: 63 % (ref 43–78)
NRBC # BLD: 0 K/UL (ref 0–0.2)
PLATELET # BLD AUTO: 183 K/UL (ref 150–450)
PMV BLD AUTO: 12.6 FL (ref 9.4–12.3)
POTASSIUM SERPL-SCNC: 3.9 MMOL/L (ref 3.5–5.1)
PROTHROMBIN TIME: 20.5 SEC (ref 12.6–14.5)
RBC # BLD AUTO: 4.81 M/UL (ref 4.23–5.6)
SODIUM SERPL-SCNC: 135 MMOL/L (ref 136–145)
WBC # BLD AUTO: 7.6 K/UL (ref 4.3–11.1)

## 2021-12-13 PROCEDURE — 36415 COLL VENOUS BLD VENIPUNCTURE: CPT

## 2021-12-13 PROCEDURE — 85025 COMPLETE CBC W/AUTO DIFF WBC: CPT

## 2021-12-13 PROCEDURE — 80048 BASIC METABOLIC PNL TOTAL CA: CPT

## 2021-12-13 PROCEDURE — 85610 PROTHROMBIN TIME: CPT

## 2021-12-13 PROCEDURE — 85730 THROMBOPLASTIN TIME PARTIAL: CPT

## 2021-12-13 NOTE — PERIOP NOTES
PLEASE CONTINUE TAKING ALL PRESCRIPTION MEDICATIONS UP TO THE DAY OF SURGERY UNLESS OTHERWISE DIRECTED BELOW. DISCONTINUE all vitamins and supplements 7 days prior to surgery. DISCONTINUE Non-Steriodal Anti-Inflammatory (NSAIDS) such as Advil and Aleve 5 days prior to surgery. Home Medications to take  the day of surgery    Clindamycin, Plavix, gabapentin, Metoprolol, Oxycodone if needed           Home Medications   to Hold   No metformin morning of surgery    Coumadin     Comments    Covid test 12/13@ [facility], [place]    On the day before surgery please take Acetaminophen 1000mg in the morning and then again before bed. You may substitute for Tylenol 650 mg. Please do not bring home medications with you on the day of surgery unless otherwise directed by your nurse. If you are instructed to bring home medications, please give them to your nurse as they will be administered by the nursing staff. If you have any questions, please call [facility] [phone] or [facility] [phone]. A copy of this note was provided to the patient for reference.

## 2021-12-13 NOTE — PROGRESS NOTES
Left detailed message with call back number provided. Pt told to arrive at 0730, NPO at midnight, Hold metformin tonight and in the morn, hold coumadin tonight if he has not been holding it, pt instructed to take 4 (81 mg) ASA in the morning.

## 2021-12-13 NOTE — PERIOP NOTES
Patient verified name and     Order for consent  found in EHR and matches case posting; patient verified. Type 1B surgery, walk in assessment complete. Labs per surgeon: cbc,bmp,pt,ptt; results pending  Labs per anesthesia protocol: Glucose ; results pending  EKG: not needed at time of PAT, pt scheduled for heart cath 21    Patient COVID test date 21; after PAT visit. [facility] [place]. If appointment is needed patient provided telephone number of [phone]. Matilde Loya at Dr Saran Garcia office notified that pt is scheduled for heart cath in AM. Chart  placed to review results. He will need to hold his Coumadin for 48 hrs prior to the arteriogram but may continue his Plavix and ASA. Note from [facility] NP in [address] [place] [address] 21. Pt is aware of these instructions. Hospital approved surgical skin cleanser and instructions given per hospital policy. Patient provided with and instructed on educational handouts including Guide to Surgery, Pain Management, Hand Hygiene, Blood Transfusion Education, and [place] [facility] Brochure. Patient answered medical/surgical history questions at their best of ability. All prior to admission medications documented in [facility]. Original medication prescription bottle not visualized during patient appointment. Patient instructed to hold all vitamins 7 days prior to surgery and NSAIDS 5 days prior to surgery, patient verbalized understanding. Patient teach back successful and patient demonstrates knowledge of instructions.

## 2021-12-14 ENCOUNTER — HOSPITAL ENCOUNTER (OUTPATIENT)
Age: 71
Setting detail: OUTPATIENT SURGERY
Discharge: HOME | End: 2021-12-14
Attending: INTERNAL MEDICINE | Admitting: INTERNAL MEDICINE
Payer: MEDICARE

## 2021-12-14 VITALS
WEIGHT: 196 LBS | DIASTOLIC BLOOD PRESSURE: 64 MMHG | BODY MASS INDEX: 24.37 KG/M2 | HEART RATE: 64 BPM | HEIGHT: 75 IN | OXYGEN SATURATION: 98 % | SYSTOLIC BLOOD PRESSURE: 116 MMHG

## 2021-12-14 DIAGNOSIS — I47.20 VENTRICULAR TACHYCARDIA: ICD-10-CM

## 2021-12-14 DIAGNOSIS — I99.8 ISCHEMIA OF LEFT LOWER EXTREMITY: ICD-10-CM

## 2021-12-14 DIAGNOSIS — I25.119 CORONARY ARTERY DISEASE INVOLVING NATIVE CORONARY ARTERY OF NATIVE HEART WITH ANGINA PECTORIS (HCC): ICD-10-CM

## 2021-12-14 LAB
ATRIAL RATE: 54 BPM
CALCULATED P AXIS, ECG09: 52 DEGREES
CALCULATED R AXIS, ECG10: 96 DEGREES
CALCULATED T AXIS, ECG11: -33 DEGREES
DIAGNOSIS, 93000: NORMAL
INR PPP: 1.5
P-R INTERVAL, ECG05: 186 MS
PROTHROMBIN TIME: 18.1 SEC (ref 12.6–14.5)
Q-T INTERVAL, ECG07: 466 MS
QRS DURATION, ECG06: 98 MS
QTC CALCULATION (BEZET), ECG08: 441 MS
VENTRICULAR RATE, ECG03: 54 BPM

## 2021-12-14 PROCEDURE — 74011250637 HC RX REV CODE- 250/637: Performed by: INTERNAL MEDICINE

## 2021-12-14 PROCEDURE — 99152 MOD SED SAME PHYS/QHP 5/>YRS: CPT | Performed by: INTERNAL MEDICINE

## 2021-12-14 PROCEDURE — 74011000250 HC RX REV CODE- 250: Performed by: INTERNAL MEDICINE

## 2021-12-14 PROCEDURE — C1894 INTRO/SHEATH, NON-LASER: HCPCS | Performed by: INTERNAL MEDICINE

## 2021-12-14 PROCEDURE — 77030042317 HC BND COMPR HEMSTAT -B: Performed by: INTERNAL MEDICINE

## 2021-12-14 PROCEDURE — 93458 L HRT ARTERY/VENTRICLE ANGIO: CPT | Performed by: INTERNAL MEDICINE

## 2021-12-14 PROCEDURE — C1769 GUIDE WIRE: HCPCS | Performed by: INTERNAL MEDICINE

## 2021-12-14 PROCEDURE — 74011250636 HC RX REV CODE- 250/636: Performed by: INTERNAL MEDICINE

## 2021-12-14 PROCEDURE — 77030015766: Performed by: INTERNAL MEDICINE

## 2021-12-14 PROCEDURE — 74011000636 HC RX REV CODE- 636: Performed by: INTERNAL MEDICINE

## 2021-12-14 PROCEDURE — 93005 ELECTROCARDIOGRAM TRACING: CPT | Performed by: INTERNAL MEDICINE

## 2021-12-14 PROCEDURE — 77030016699 HC CATH ANGI DX INFN1 CARD -A: Performed by: INTERNAL MEDICINE

## 2021-12-14 PROCEDURE — 75710 ARTERY X-RAYS ARM/LEG: CPT | Performed by: INTERNAL MEDICINE

## 2021-12-14 PROCEDURE — 85610 PROTHROMBIN TIME: CPT

## 2021-12-14 PROCEDURE — 99153 MOD SED SAME PHYS/QHP EA: CPT | Performed by: INTERNAL MEDICINE

## 2021-12-14 RX ORDER — HEPARIN SODIUM 200 [USP'U]/100ML
INJECTION, SOLUTION INTRAVENOUS
Status: COMPLETED | OUTPATIENT
Start: 2021-12-14 | End: 2021-12-14

## 2021-12-14 RX ORDER — LIDOCAINE HYDROCHLORIDE 10 MG/ML
INJECTION INFILTRATION; PERINEURAL AS NEEDED
Status: DISCONTINUED | OUTPATIENT
Start: 2021-12-14 | End: 2021-12-14 | Stop reason: HOSPADM

## 2021-12-14 RX ORDER — SODIUM CHLORIDE 0.9 % (FLUSH) 0.9 %
5-40 SYRINGE (ML) INJECTION AS NEEDED
Status: CANCELLED | OUTPATIENT
Start: 2021-12-14

## 2021-12-14 RX ORDER — FENTANYL CITRATE 50 UG/ML
INJECTION, SOLUTION INTRAMUSCULAR; INTRAVENOUS AS NEEDED
Status: DISCONTINUED | OUTPATIENT
Start: 2021-12-14 | End: 2021-12-14 | Stop reason: HOSPADM

## 2021-12-14 RX ORDER — SODIUM CHLORIDE 0.9 % (FLUSH) 0.9 %
5-40 SYRINGE (ML) INJECTION EVERY 8 HOURS
Status: CANCELLED | OUTPATIENT
Start: 2021-12-14

## 2021-12-14 RX ORDER — SODIUM CHLORIDE 9 MG/ML
75 INJECTION, SOLUTION INTRAVENOUS CONTINUOUS
Status: CANCELLED | OUTPATIENT
Start: 2021-12-14

## 2021-12-14 RX ORDER — ONDANSETRON 2 MG/ML
4 INJECTION INTRAMUSCULAR; INTRAVENOUS
Status: CANCELLED | OUTPATIENT
Start: 2021-12-14 | End: 2021-12-15

## 2021-12-14 RX ORDER — HYDROCODONE BITARTRATE AND ACETAMINOPHEN 5; 325 MG/1; MG/1
1 TABLET ORAL
Status: CANCELLED | OUTPATIENT
Start: 2021-12-14

## 2021-12-14 RX ORDER — CLOPIDOGREL BISULFATE 75 MG/1
75 TABLET ORAL ONCE
Status: COMPLETED | OUTPATIENT
Start: 2021-12-14 | End: 2021-12-14

## 2021-12-14 RX ORDER — GUAIFENESIN 100 MG/5ML
324 LIQUID (ML) ORAL ONCE
Status: DISCONTINUED | OUTPATIENT
Start: 2021-12-14 | End: 2021-12-14 | Stop reason: HOSPADM

## 2021-12-14 RX ORDER — ACETAMINOPHEN 325 MG/1
650 TABLET ORAL
Status: CANCELLED | OUTPATIENT
Start: 2021-12-14

## 2021-12-14 RX ORDER — MIDAZOLAM HYDROCHLORIDE 1 MG/ML
INJECTION, SOLUTION INTRAMUSCULAR; INTRAVENOUS AS NEEDED
Status: DISCONTINUED | OUTPATIENT
Start: 2021-12-14 | End: 2021-12-14 | Stop reason: HOSPADM

## 2021-12-14 RX ORDER — SODIUM CHLORIDE 9 MG/ML
75 INJECTION, SOLUTION INTRAVENOUS CONTINUOUS
Status: DISCONTINUED | OUTPATIENT
Start: 2021-12-14 | End: 2021-12-14 | Stop reason: HOSPADM

## 2021-12-14 RX ADMIN — CLOPIDOGREL BISULFATE 75 MG: 75 TABLET ORAL at 08:13

## 2021-12-14 RX ADMIN — SODIUM CHLORIDE 75 ML/HR: 900 INJECTION, SOLUTION INTRAVENOUS at 08:13

## 2021-12-14 NOTE — Clinical Note
TRANSFER - OUT REPORT:     Verbal report given to: cpru rn. Report consisted of patient's Situation, Background, Assessment and   Recommendations(SBAR). Opportunity for questions and clarification was provided.

## 2021-12-14 NOTE — H&P
[facility] Cardiology Initial Cardiac Evaluation                 Date of  Admission: 12/14/2021  7:40 AM     Primary Care Physician:  Dr. Teo Calderon  Primary Cardiologist:  Dr. Matilde Kaplan  Referring Physician:  Dr. Matilde Kaplan  Attending Physician:  Dr. Shabbir Chahal     CC/Reason for consult:  NSVT      Jonas Thakkar is a 80 y.o. male with PMH of CAD ([address] [place] [address] 12/14/2021 showed patent RCA stents and moderate nonobstructive disease), PVD with multiple procedures (L SFA to popliteal artery bypass with PTFE bypass 1/2020, 8/20/2021 stent to L SFA/pop), left 2 and 3 toe amputations, AAA repair (EVAR), DM, CKD, HTN, and smoker, who presented for scheduled [facility]. Patient presented to [facility] on 12/5/2021 with c/o abdominal pain and right arm pain. Patient also noted to have left foot wound and was on abx as OP  He was placed on the monitor and noted to be having runs of NSVT. Patient did not arrest or require defibrillation. Patient denies any symptoms -- no syncope, dizziness, or near syncope. He was admitted to the ICU at [facility] and started on an amiodarone drip. Patient then proceeded to leave [place] after amputation of left leg was recommended. He then followed up with Dr. Lizet Chavez, who plans for left lower extremity arteriogram with possible intervention and will determine if there is any possibility for a re-do bypass. Given VT and new low EF of 35-40% patient was sent for follow up with Dr. Matilde Kaplan and presented today for [address] [place] [address]. LHC showed patent RCA stents and moderate nonobstructive disease. EP is asked to see patient in consult for NSVT. Echo at [facility]     The left ventricular systolic function is decreased (35 - 40%). Left ventricular global hypokinesis. Grade I (mild) left ventricular diastolic dysfunction present,   consistent with impaired relaxation. The right ventricular systolic function is normal.   No significant valvular abnormalities.          C 12/14/2021    Patent RCA stents  Moderate residual nonobstructive coronary arteries felt best treated             Medically. Moderate LV dysfunction with inferior akinesis and ejection fraction 30 to             35%. Severe peripheral vascular disease of the left lower extremity. See below. EP consultation given hemodynamically significant ventricular tachycardia             noted at prior admission to [facility]. Vascular surgery             consultation in regards to peripheral vascular disease        Past Medical History:   Diagnosis Date    Aneurysm [facility])     AAA  and right comomon iliac artery aneurysm    Arrhythmia     Benign hypertensive heart disease without CHF 6/22/2015    CAD (coronary artery disease)     Inferior MI (9/2007) and (10/27/08)  Vision BMS x 4 on 2007. stent x 9     Diabetes ([address]     oral meds; does not check blood sugars    Edema     Heart failure ([address]     Hypertension     managed with medications    Ischemic cardiomyopathy     [place] (2007) EF 20%     [place] (2008) EF 40%  echo (1/14/11): Mild LV dysfunction. EF 45%. Impaired relaxation. Mod. left atrial enlargement. Mild mitral/tricuspid/pulmonic regurgitation    Other diseases of lung, not elsewhere classified     CT of chest 8/2010:  5 mm right upper lobe pulmonary nodule. 1 mm nodule in left upper lobe. .  CT chest (5/7/12) Stable tiny right upper lobe pulmonary nodule suggesting benign etiology.       Toe amputee ([address]     Lt toe      Past Surgical History:   Procedure Laterality Date    HX COLONOSCOPY      HX CORONARY STENT PLACEMENT      stents x4 (2007) then 5 stents (2008)    HX HEART CATHETERIZATION  2007 and 2008    both caths with stents    HX LUMBAR FUSION  2009    fusion L4, L5  (Dr. Francis Roman)    HX ORTHOPAEDIC      left toes amputation    VASCULAR SURGERY PROCEDURE UNLIST  10/2013    x3 stents aorta    VASCULAR SURGERY PROCEDURE UNLIST  01/23/2020    Left superficial femoral artery to popliteal artery bypass with PTFE graft, Exploration of left popliteal artery,Intraoperative ultrasound of left greater saphenous vein     Allergies   Allergen Reactions    Bactrim [Sulfamethoprim] Angioedema     Severe reaction, affected kidney function    Glipizide Rash    Morphine Nausea and Vomiting    Xanax [Alprazolam] Shortness of Breath      Family History   Problem Relation Age of Onset    Diabetes Father     Hypertension Father         Current Facility-Administered Medications   Medication Dose Route Frequency    0.9% sodium chloride infusion  75 mL/hr IntraVENous CONTINUOUS    aspirin chewable tablet 324 mg  324 mg Oral ONCE       Review of Systems   Constitutional: Negative. HENT: Negative. Eyes: Negative. Respiratory: Negative. Cardiovascular: Positive for chest pain. Gastrointestinal: Negative. Musculoskeletal: Negative. Skin: Negative. Neurological: Negative. Endo/Heme/Allergies: Negative. Psychiatric/Behavioral: Negative. Physical Exam  Vitals:    12/14/21 0806 12/14/21 0946   BP: 136/80 123/62   Pulse: 64    SpO2: 96%    Weight: 88.9 kg (196 lb)    Height: 6' 3\" (1.905 m)        Physical Exam:  Physical Exam  Eyes:      Pupils: Pupils are equal, round, and reactive to light. Cardiovascular:      Rate and Rhythm: Normal rate and regular rhythm. Pulmonary:      Effort: Pulmonary effort is normal.      Breath sounds: Normal breath sounds. Abdominal:      General: Bowel sounds are normal.   Musculoskeletal:         General: Normal range of motion. Skin:     General: Skin is warm and dry. Neurological:      General: No focal deficit present. Mental Status: He is alert and oriented to person, place, and time. Psychiatric:         Mood and Affect: Mood normal.         Behavior: Behavior normal.         Thought Content:  Thought content normal.         Judgment: Judgment normal.           Labs:   Recent Labs     12/14/21  0809 12/13/21  1439   NA  --  135*   K  --  3.9   BUN  --  18 CREA  --  1.40   GLU  --  214*   WBC  --  7.6   HGB  --  12.3*   HCT  --  40.4*   PLT  --  183   INR 1.5 1.7        Assessment/Plan:     Assessment:      Principal Problem:    CAD (coronary artery disease) -- patent RCA stents and moderate nonobstructive disease. Follow up with Dr. Liyah Harrison in one month. Ventricular tachycardia -- at this time patient not a candidate for ICD placement at this time. Will arrange life vest and repeat echo in 3 months. Discussed importance of compliance with wearing life vest, patient and spouse voiced understanding. Diabetes mellitus type 2, controlled -- continue home meds      PAD (peripheral artery disease) -- followed by Dr. Elsa Hastings, to have left arteriogram with possible bypass in near future. Thank you very much for this referral. We appreciate the opportunity to participate in this patient's care. We will follow along with above stated plan.     Esthela Katz NP  Attending MD: Milind Mendoza

## 2021-12-14 NOTE — PROGRESS NOTES
Patient up to bedside, vital signs and site stable. Patient ambulated to bathroom without difficulty. Patient voided without difficulty. Vascular site stable. Discharge instructions and home medications reviewed with patient. Time allowed for questions and answers. Site stable after ambulation. Peripheral IV sites dc'd without difficulty with tips intact. 1128 Patient discharged to home with family.

## 2021-12-14 NOTE — DISCHARGE INSTRUCTIONS
HEART CATHETERIZATION/ANGIOGRAPHY DISCHARGE INSTRUCTIONS    1. Check puncture site frequently for swelling or bleeding. If there is any bleeding, apply pressure over the area with a clean towel or washcloth and call 911. Notify your doctor for any redness, swelling, drainage, or oozing from the puncture site. Notify your doctor for any fever or chills. 2. If the extremity becomes cold, numb, or painful call Dr. Sukhi Toth at ***. 3. Activity should be limited for the next 48 hours. No heavy lifting, pushing, pulling  or strenuous activity for 48 hours. No heavy lifting (anything over 10 pounds) for 3 days. 4. You may resume your usual diet. Drink more fluids than usual.  5. Have a responsible person drive you home and stay with you for at least 24 hours after your heart catheterization/angiography. 6. You may remove bandage from your {ARM/GROIN:78871} in 24 hours. You may shower in 24 hours. No tub baths, hot tubs, or swimming for 1 week. Do not place any lotions, creams, powders, or ointments over puncture site for 1 week. You may place a clean band-aid over the puncture site each day for 5 days. Change daily. Sedation for a Medical Procedure: Care Instructions     You were given a sedative medication during your visit. While many of the effects will have worn   off before you leave; you may continue to feel some effects for several hours. Common side effects from sedation include:  · Feeling sleepy. (Your doctors and nurses will make sure you are not too sleepy to go home.)  · Nausea and vomiting. This usually does not last long. · Feeling tired. How can you care for yourself at home? Activity    · Don't do anything for 24 hours that requires attention to detail. It takes time for the medicine effects to completely wear off. · Do not make important legal decisions for 24 hours. · Do not sign any legal documents for 24 hours.      · Do not drink alcohol today     · For your safety, you should not drive or operate heavy machinery for the remainder of the day     · Rest when you feel tired. Getting enough sleep will help you recover. Diet    · You can eat your normal diet, unless your doctor gives you other instructions. If your stomach is upset, try clear liquids and bland, low-fat foods like plain toast or rice. · Drink plenty of fluids (unless your doctor tells you not to). · Don't drink alcohol for 24 hours. Medicines    · Be safe with medicines. Read and follow all instructions on the label. · If the doctor gave you a prescription medicine for pain, take it as prescribed. · If you are not taking a prescription pain medicine, ask your doctor if you can take an over-the-counter medicine. · If you think your pain medicine is making you sick to your stomach:  · Take your medicine after meals (unless your doctor has told you not to). · Ask your doctor for a different pain medicine. I have read the above instructions and have had the opportunity to ask questions.       Patient: ________________________   Date: _____________    Witness: _______________________   Date: _____________

## 2021-12-14 NOTE — PROGRESS NOTES
Report received from [address] Procedural findings communicated. Intra procedural  medication administration reviewed. Progression of care discussed.      Patient received into [address] [facility] 2 post sheath removal.     Access site without bleeding or swelling yes    Dressing dry and intact yes    Patient instructed to limit movement to right upper extremity    Routine post procedural vital signs and site assessment initiated yes

## 2021-12-14 NOTE — PROGRESS NOTES
TRANSFER - OUT REPORT:    Verbal report given to RN(name) on Flakita Pick  being transferred to CPRU(unit) for routine progression of care       Report consisted of patients Situation, Background, Assessment and   Recommendations(SBAR). Information from the following report(s) SBAR was reviewed with the receiving nurse.     [facility] silvio ENRIQUEA - TR band 10 Mls  Diagnostic, no interventions  2 mg Versed  50 mcg Fentanyl  5000 units Heparin

## 2022-02-11 ENCOUNTER — HOSPITAL ENCOUNTER (OUTPATIENT)
Dept: LAB | Age: 72
Discharge: HOME | End: 2022-02-11
Payer: MEDICARE

## 2022-02-11 LAB
ANION GAP SERPL CALC-SCNC: 4 MMOL/L (ref 7–16)
BASOPHILS # BLD: 0 K/UL (ref 0–0.2)
BASOPHILS NFR BLD: 1 % (ref 0–2)
BUN SERPL-MCNC: 20 MG/DL (ref 8–23)
CALCIUM SERPL-MCNC: 8.8 MG/DL (ref 8.3–10.4)
CHLORIDE SERPL-SCNC: 109 MMOL/L (ref 98–107)
CO2 SERPL-SCNC: 25 MMOL/L (ref 21–32)
CREAT SERPL-MCNC: 1.4 MG/DL (ref 0.8–1.5)
DIFFERENTIAL METHOD BLD: ABNORMAL
EOSINOPHIL # BLD: 0.2 K/UL (ref 0–0.8)
EOSINOPHIL NFR BLD: 3 % (ref 0.5–7.8)
ERYTHROCYTE [DISTWIDTH] IN BLOOD BY AUTOMATED COUNT: 20.6 % (ref 11.9–14.6)
GLUCOSE SERPL-MCNC: 168 MG/DL (ref 65–100)
HCT VFR BLD AUTO: 36.1 % (ref 41.1–50.3)
HGB BLD-MCNC: 11.2 G/DL (ref 13.6–17.2)
IMM GRANULOCYTES # BLD AUTO: 0 K/UL (ref 0–0.5)
IMM GRANULOCYTES NFR BLD AUTO: 0 % (ref 0–5)
INR PPP: 2.6
LYMPHOCYTES # BLD: 1.9 K/UL (ref 0.5–4.6)
LYMPHOCYTES NFR BLD: 29 % (ref 13–44)
MCH RBC QN AUTO: 25.8 PG (ref 26.1–32.9)
MCHC RBC AUTO-ENTMCNC: 31 G/DL (ref 31.4–35)
MCV RBC AUTO: 83.2 FL (ref 79.6–97.8)
MONOCYTES # BLD: 0.6 K/UL (ref 0.1–1.3)
MONOCYTES NFR BLD: 9 % (ref 4–12)
NEUTS SEG # BLD: 3.9 K/UL (ref 1.7–8.2)
NEUTS SEG NFR BLD: 58 % (ref 43–78)
NRBC # BLD: 0 K/UL (ref 0–0.2)
PLATELET # BLD AUTO: 120 K/UL (ref 150–450)
PMV BLD AUTO: ABNORMAL FL (ref 9.4–12.3)
POTASSIUM SERPL-SCNC: 4.6 MMOL/L (ref 3.5–5.1)
PROTHROMBIN TIME: 28 SEC (ref 12.6–14.5)
RBC # BLD AUTO: 4.34 M/UL (ref 4.23–5.6)
SODIUM SERPL-SCNC: 138 MMOL/L (ref 136–145)
WBC # BLD AUTO: 6.8 K/UL (ref 4.3–11.1)

## 2022-02-11 PROCEDURE — 83735 ASSAY OF MAGNESIUM: CPT

## 2022-02-11 PROCEDURE — 85025 COMPLETE CBC W/AUTO DIFF WBC: CPT

## 2022-02-11 PROCEDURE — 36415 COLL VENOUS BLD VENIPUNCTURE: CPT

## 2022-02-11 PROCEDURE — 80048 BASIC METABOLIC PNL TOTAL CA: CPT

## 2022-02-11 PROCEDURE — 85610 PROTHROMBIN TIME: CPT

## 2022-02-14 LAB — MAGNESIUM SERPL-MCNC: NORMAL MG/DL

## 2022-03-01 PROBLEM — I25.119 ATHEROSCLEROTIC HEART DISEASE OF NATIVE CORONARY ARTERY WITH UNSPECIFIED ANGINA PECTORIS (HCC): Status: ACTIVE | Noted: 2022-03-01

## 2022-03-01 PROBLEM — I20.9 ANGINA PECTORIS, UNSPECIFIED (HCC): Status: ACTIVE | Noted: 2022-03-01

## 2022-03-18 PROBLEM — E11.21 TYPE 2 DIABETES WITH NEPHROPATHY (HCC): Status: ACTIVE | Noted: 2018-04-30

## 2022-03-18 PROBLEM — I25.119 ATHEROSCLEROTIC HEART DISEASE OF NATIVE CORONARY ARTERY WITH UNSPECIFIED ANGINA PECTORIS (HCC): Status: ACTIVE | Noted: 2022-03-01

## 2022-03-19 PROBLEM — Z95.828 H/O EXTREMITY BYPASS GRAFT: Status: ACTIVE | Noted: 2021-12-08

## 2022-03-19 PROBLEM — I47.20 VENTRICULAR TACHYCARDIA (HCC): Status: ACTIVE | Noted: 2021-12-10

## 2022-03-19 PROBLEM — D50.9 IRON DEFICIENCY ANEMIA: Status: ACTIVE | Noted: 2017-01-25

## 2022-03-19 PROBLEM — I20.9 ANGINA PECTORIS, UNSPECIFIED (HCC): Status: ACTIVE | Noted: 2022-03-01

## 2022-03-19 PROBLEM — I73.9 PAD (PERIPHERAL ARTERY DISEASE) (HCC): Status: ACTIVE | Noted: 2020-01-23

## 2022-03-19 PROBLEM — E11.40 TYPE 2 DIABETES MELLITUS WITH DIABETIC NEUROPATHY (HCC): Status: ACTIVE | Noted: 2018-04-30

## 2022-03-19 PROBLEM — G62.9 SENSORY MOTOR NEUROPATHY: Status: ACTIVE | Noted: 2018-05-01

## 2022-03-19 PROBLEM — G63 POLYNEUROPATHY ASSOCIATED WITH UNDERLYING DISEASE (HCC): Status: ACTIVE | Noted: 2017-01-25

## 2022-03-20 PROBLEM — I99.8 ISCHEMIA OF LEFT LOWER EXTREMITY: Status: ACTIVE | Noted: 2020-01-23

## 2022-08-26 RX ORDER — LISINOPRIL AND HYDROCHLOROTHIAZIDE 25; 20 MG/1; MG/1
TABLET ORAL
Qty: 90 TABLET | OUTPATIENT
Start: 2022-08-26

## 2022-08-26 RX ORDER — METOPROLOL SUCCINATE 50 MG/1
TABLET, EXTENDED RELEASE ORAL
Qty: 90 TABLET | OUTPATIENT
Start: 2022-08-26

## 2022-08-26 RX ORDER — CLOPIDOGREL BISULFATE 75 MG/1
TABLET ORAL
Qty: 90 TABLET | OUTPATIENT
Start: 2022-08-26

## 2022-08-31 ENCOUNTER — APPOINTMENT (RX ONLY)
Dept: URBAN - METROPOLITAN AREA CLINIC 329 | Facility: CLINIC | Age: 72
Setting detail: DERMATOLOGY
End: 2022-08-31

## 2022-08-31 DIAGNOSIS — L40.0 PSORIASIS VULGARIS: ICD-10-CM | Status: INADEQUATELY CONTROLLED

## 2022-08-31 PROBLEM — D48.5 NEOPLASM OF UNCERTAIN BEHAVIOR OF SKIN: Status: ACTIVE | Noted: 2022-08-31

## 2022-08-31 PROCEDURE — ? FULL BODY SKIN EXAM - DECLINED

## 2022-08-31 PROCEDURE — ? BIOPSY BY SHAVE METHOD

## 2022-08-31 PROCEDURE — ? PRESCRIPTION

## 2022-08-31 PROCEDURE — 11102 TANGNTL BX SKIN SINGLE LES: CPT

## 2022-08-31 PROCEDURE — ? ADDITIONAL NOTES

## 2022-08-31 PROCEDURE — 99214 OFFICE O/P EST MOD 30 MIN: CPT | Mod: 25

## 2022-08-31 PROCEDURE — ? COUNSELING

## 2022-08-31 PROCEDURE — ? PRESCRIPTION MEDICATION MANAGEMENT

## 2022-08-31 RX ORDER — FLUTICASONE PROPIONATE 0.05 MG/G
OINTMENT TOPICAL
Qty: 1 | Refills: 3 | Status: ERX | COMMUNITY
Start: 2022-08-31

## 2022-08-31 RX ORDER — TRIAMCINOLONE ACETONIDE 1 MG/G
CREAM TOPICAL
Qty: 454 | Refills: 4 | Status: ERX | COMMUNITY
Start: 2022-08-31

## 2022-08-31 RX ADMIN — FLUTICASONE PROPIONATE: 0.05 OINTMENT TOPICAL at 00:00

## 2022-08-31 RX ADMIN — TRIAMCINOLONE ACETONIDE: 1 CREAM TOPICAL at 00:00

## 2022-08-31 ASSESSMENT — LOCATION DETAILED DESCRIPTION DERM
LOCATION DETAILED: RIGHT ANTERIOR DISTAL THIGH
LOCATION DETAILED: LEFT ANTERIOR DISTAL THIGH
LOCATION DETAILED: RIGHT INFERIOR LATERAL LOWER BACK
LOCATION DETAILED: RIGHT PROXIMAL DORSAL FOREARM
LOCATION DETAILED: LEFT PROXIMAL DORSAL FOREARM

## 2022-08-31 ASSESSMENT — LOCATION ZONE DERM
LOCATION ZONE: LEG
LOCATION ZONE: ARM
LOCATION ZONE: TRUNK

## 2022-08-31 ASSESSMENT — LOCATION SIMPLE DESCRIPTION DERM
LOCATION SIMPLE: RIGHT THIGH
LOCATION SIMPLE: RIGHT LOWER BACK
LOCATION SIMPLE: LEFT FOREARM
LOCATION SIMPLE: RIGHT FOREARM
LOCATION SIMPLE: LEFT THIGH

## 2022-08-31 ASSESSMENT — PGA PSORIASIS: PGA PSORIASIS 2020: SEVERE

## 2022-08-31 ASSESSMENT — BSA PSORIASIS: % BODY COVERED IN PSORIASIS: 55

## 2022-08-31 NOTE — PROCEDURE: PRESCRIPTION MEDICATION MANAGEMENT
Initiate Treatment: Triamcinolone\\nFluticasone ointment
Render In Strict Bullet Format?: No
Detail Level: Zone

## 2022-08-31 NOTE — PROCEDURE: MIPS QUALITY
Quality 130: Documentation Of Current Medications In The Medical Record: Current Medications Documented
Quality 47: Advance Care Plan: Advance Care Planning discussed and documented in the medical record; patient did not wish or was not able to name a surrogate decision maker or provide an advance care plan.
Quality 111:Pneumonia Vaccination Status For Older Adults: Pneumococcal vaccine was not administered on or after patient’s 60th birthday and before the end of the measurement period, reason not otherwise specified
Detail Level: Detailed

## 2022-09-01 ENCOUNTER — TELEMEDICINE (OUTPATIENT)
Dept: PRIMARY CARE CLINIC | Facility: CLINIC | Age: 72
End: 2022-09-01
Payer: MEDICARE

## 2022-09-01 DIAGNOSIS — N18.30 STAGE 3 CHRONIC KIDNEY DISEASE, UNSPECIFIED WHETHER STAGE 3A OR 3B CKD (HCC): ICD-10-CM

## 2022-09-01 DIAGNOSIS — N40.1 BENIGN PROSTATIC HYPERPLASIA WITH URINARY FREQUENCY: ICD-10-CM

## 2022-09-01 DIAGNOSIS — I25.118 CORONARY ARTERY DISEASE OF NATIVE HEART WITH STABLE ANGINA PECTORIS, UNSPECIFIED VESSEL OR LESION TYPE (HCC): ICD-10-CM

## 2022-09-01 DIAGNOSIS — I25.119 ATHEROSCLEROSIS OF NATIVE CORONARY ARTERY OF NATIVE HEART WITH ANGINA PECTORIS (HCC): Primary | ICD-10-CM

## 2022-09-01 DIAGNOSIS — I10 ESSENTIAL HYPERTENSION WITH GOAL BLOOD PRESSURE LESS THAN 140/90: ICD-10-CM

## 2022-09-01 DIAGNOSIS — R35.0 BENIGN PROSTATIC HYPERPLASIA WITH URINARY FREQUENCY: ICD-10-CM

## 2022-09-01 PROCEDURE — 99442 PR PHYS/QHP TELEPHONE EVALUATION 11-20 MIN: CPT | Performed by: FAMILY MEDICINE

## 2022-09-01 RX ORDER — CLOPIDOGREL BISULFATE 75 MG/1
75 TABLET ORAL DAILY
Qty: 90 TABLET | Refills: 3 | Status: SHIPPED | OUTPATIENT
Start: 2022-09-01

## 2022-09-01 RX ORDER — METOPROLOL SUCCINATE 50 MG/1
50 TABLET, EXTENDED RELEASE ORAL DAILY
Qty: 90 TABLET | Refills: 3 | Status: SHIPPED | OUTPATIENT
Start: 2022-09-01

## 2022-09-01 RX ORDER — LISINOPRIL AND HYDROCHLOROTHIAZIDE 25; 20 MG/1; MG/1
1 TABLET ORAL DAILY
Qty: 90 TABLET | Refills: 3 | Status: SHIPPED | OUTPATIENT
Start: 2022-09-01

## 2022-09-01 RX ORDER — TAMSULOSIN HYDROCHLORIDE 0.4 MG/1
0.4 CAPSULE ORAL DAILY
Qty: 90 CAPSULE | Refills: 3 | Status: SHIPPED | OUTPATIENT
Start: 2022-09-01

## 2022-09-01 NOTE — PROGRESS NOTES
Kristy Morris is a 80 y.o. male evaluated via telephone on 9/1/2022 for No chief complaint on file. .        Documentation:  I communicated with the patient and/or health care decision maker about medication refills. Emeka MARIE   Pt isays he s doing pretty good, after having been in hospital about foot. Pt has a visit with vascular Thursday vascular, cardiology and pain management also next week. Pt reports  Vascular has unblocked artery in leg. Pt was told by vascular doctor that he wanted to admit him and reports he is in an ongoing discussion with vascular about leg and foot. Pt has antibiotic, and has to take about another 3 days. Left leg is getting better after having artery unblocked. Pt says he may need further procedures. Pt says he is happier this physician did not take off his foot, as another had recommended. Pt says he is close to home, as he is essentially blind since eye surgery. Pt is given the following medication refills:  Plavix for blood theinner  Lisinopril/hctz for BP management, stable. Toprol for heart regulation and BP mangement, stable  Flomax is requested for BPH with urinary hesitancy. Pt says this helps him go to bathroom at least once less at night while sleeping, so he finds it a helpful medication. Pt will RTC   Details of this discussion including any medical advice provided: see above    Total Time: minutes: 11-20 minutes    Kristy Morris was evaluated through a synchronous (real-time) audio encounter. Patient identification was verified at the start of the visit. He (or guardian if applicable) is aware that this is a billable service, which includes applicable co-pays. This visit was conducted with the patient's (and/or legal guardian's) verbal consent. He has not had a related appointment within my department in the past 7 days or scheduled within the next 24 hours. The patient was located at Home: [address]   The provider was located at [facility] ([address] [place] [address]): [place] [address],  [facility] [address]     Note: not billable if this call serves to triage the patient into an appointment for the relevant concern    Phyllis Batista MD

## 2022-10-31 ENCOUNTER — OFFICE VISIT (OUTPATIENT)
Dept: PRIMARY CARE CLINIC | Facility: CLINIC | Age: 72
End: 2022-10-31
Payer: MEDICARE

## 2022-10-31 VITALS
OXYGEN SATURATION: 98 % | HEART RATE: 41 BPM | HEIGHT: 75 IN | BODY MASS INDEX: 23.62 KG/M2 | WEIGHT: 190 LBS | TEMPERATURE: 97.8 F | SYSTOLIC BLOOD PRESSURE: 113 MMHG | DIASTOLIC BLOOD PRESSURE: 54 MMHG

## 2022-10-31 DIAGNOSIS — E11.40 TYPE 2 DIABETES MELLITUS WITH DIABETIC NEUROPATHY, WITH LONG-TERM CURRENT USE OF INSULIN (HCC): ICD-10-CM

## 2022-10-31 DIAGNOSIS — E55.9 VITAMIN D DEFICIENCY DISEASE: ICD-10-CM

## 2022-10-31 DIAGNOSIS — Z79.4 TYPE 2 DIABETES MELLITUS WITH DIABETIC NEUROPATHY, WITH LONG-TERM CURRENT USE OF INSULIN (HCC): ICD-10-CM

## 2022-10-31 DIAGNOSIS — H54.3 BLINDNESS OF BOTH EYES: ICD-10-CM

## 2022-10-31 DIAGNOSIS — I10 ESSENTIAL HYPERTENSION WITH GOAL BLOOD PRESSURE LESS THAN 140/90: ICD-10-CM

## 2022-10-31 DIAGNOSIS — G63 POLYNEUROPATHY ASSOCIATED WITH UNDERLYING DISEASE (HCC): ICD-10-CM

## 2022-10-31 DIAGNOSIS — Z00.00 MEDICARE ANNUAL WELLNESS VISIT, SUBSEQUENT: Primary | ICD-10-CM

## 2022-10-31 LAB
25(OH)D3 SERPL-MCNC: 17.5 NG/ML (ref 30–100)
ALBUMIN SERPL-MCNC: 2.9 G/DL (ref 3.2–4.6)
ALBUMIN/GLOB SERPL: 0.5 {RATIO} (ref 0.4–1.6)
ALP SERPL-CCNC: 103 U/L (ref 50–136)
ALT SERPL-CCNC: 17 U/L (ref 12–65)
ANION GAP SERPL CALC-SCNC: 3 MMOL/L (ref 2–11)
AST SERPL-CCNC: 21 U/L (ref 15–37)
BASOPHILS # BLD: 0.1 K/UL (ref 0–0.2)
BASOPHILS NFR BLD: 1 % (ref 0–2)
BILIRUB SERPL-MCNC: 0.3 MG/DL (ref 0.2–1.1)
BUN SERPL-MCNC: 25 MG/DL (ref 8–23)
CALCIUM SERPL-MCNC: 9.3 MG/DL (ref 8.3–10.4)
CHLORIDE SERPL-SCNC: 107 MMOL/L (ref 101–110)
CHOLEST SERPL-MCNC: 156 MG/DL
CO2 SERPL-SCNC: 27 MMOL/L (ref 21–32)
CREAT SERPL-MCNC: 1.4 MG/DL (ref 0.8–1.5)
DIFFERENTIAL METHOD BLD: ABNORMAL
EOSINOPHIL # BLD: 0.2 K/UL (ref 0–0.8)
EOSINOPHIL NFR BLD: 3 % (ref 0.5–7.8)
ERYTHROCYTE [DISTWIDTH] IN BLOOD BY AUTOMATED COUNT: 17.2 % (ref 11.9–14.6)
EST. AVERAGE GLUCOSE BLD GHB EST-MCNC: 160 MG/DL
GLOBULIN SER CALC-MCNC: 5.4 G/DL (ref 2.8–4.5)
GLUCOSE SERPL-MCNC: 140 MG/DL (ref 65–100)
HBA1C MFR BLD: 7.2 % (ref 4.8–5.6)
HCT VFR BLD AUTO: 41.7 % (ref 41.1–50.3)
HDLC SERPL-MCNC: 32 MG/DL (ref 40–60)
HDLC SERPL: 4.9 {RATIO}
HGB BLD-MCNC: 12.6 G/DL (ref 13.6–17.2)
IMM GRANULOCYTES # BLD AUTO: 0 K/UL (ref 0–0.5)
IMM GRANULOCYTES NFR BLD AUTO: 0 % (ref 0–5)
LDLC SERPL CALC-MCNC: 79.6 MG/DL
LYMPHOCYTES # BLD: 1.8 K/UL (ref 0.5–4.6)
LYMPHOCYTES NFR BLD: 24 % (ref 13–44)
MCH RBC QN AUTO: 27.3 PG (ref 26.1–32.9)
MCHC RBC AUTO-ENTMCNC: 30.2 G/DL (ref 31.4–35)
MCV RBC AUTO: 90.3 FL (ref 82–102)
MONOCYTES # BLD: 0.5 K/UL (ref 0.1–1.3)
MONOCYTES NFR BLD: 6 % (ref 4–12)
NEUTS SEG # BLD: 5.1 K/UL (ref 1.7–8.2)
NEUTS SEG NFR BLD: 66 % (ref 43–78)
NRBC # BLD: 0 K/UL (ref 0–0.2)
PLATELET # BLD AUTO: 158 K/UL (ref 150–450)
PMV BLD AUTO: 13.7 FL (ref 9.4–12.3)
POTASSIUM SERPL-SCNC: 4.5 MMOL/L (ref 3.5–5.1)
PROT SERPL-MCNC: 8.3 G/DL (ref 6.3–8.2)
RBC # BLD AUTO: 4.62 M/UL (ref 4.23–5.6)
SODIUM SERPL-SCNC: 137 MMOL/L (ref 133–143)
T4 FREE SERPL-MCNC: 1.1 NG/DL (ref 0.78–1.46)
TRIGL SERPL-MCNC: 222 MG/DL (ref 35–150)
TSH, 3RD GENERATION: 3.4 UIU/ML (ref 0.36–3.74)
VLDLC SERPL CALC-MCNC: 44.4 MG/DL (ref 6–23)
WBC # BLD AUTO: 7.7 K/UL (ref 4.3–11.1)

## 2022-10-31 PROCEDURE — 3074F SYST BP LT 130 MM HG: CPT | Performed by: FAMILY MEDICINE

## 2022-10-31 PROCEDURE — G0439 PPPS, SUBSEQ VISIT: HCPCS | Performed by: FAMILY MEDICINE

## 2022-10-31 PROCEDURE — 99214 OFFICE O/P EST MOD 30 MIN: CPT | Performed by: FAMILY MEDICINE

## 2022-10-31 PROCEDURE — 3051F HG A1C>EQUAL 7.0%<8.0%: CPT | Performed by: FAMILY MEDICINE

## 2022-10-31 PROCEDURE — 3017F COLORECTAL CA SCREEN DOC REV: CPT | Performed by: FAMILY MEDICINE

## 2022-10-31 PROCEDURE — 2022F DILAT RTA XM EVC RTNOPTHY: CPT | Performed by: FAMILY MEDICINE

## 2022-10-31 PROCEDURE — 1123F ACP DISCUSS/DSCN MKR DOCD: CPT | Performed by: FAMILY MEDICINE

## 2022-10-31 PROCEDURE — G8420 CALC BMI NORM PARAMETERS: HCPCS | Performed by: FAMILY MEDICINE

## 2022-10-31 PROCEDURE — G8427 DOCREV CUR MEDS BY ELIG CLIN: HCPCS | Performed by: FAMILY MEDICINE

## 2022-10-31 PROCEDURE — G8484 FLU IMMUNIZE NO ADMIN: HCPCS | Performed by: FAMILY MEDICINE

## 2022-10-31 PROCEDURE — 4004F PT TOBACCO SCREEN RCVD TLK: CPT | Performed by: FAMILY MEDICINE

## 2022-10-31 PROCEDURE — 3078F DIAST BP <80 MM HG: CPT | Performed by: FAMILY MEDICINE

## 2022-10-31 ASSESSMENT — PATIENT HEALTH QUESTIONNAIRE - PHQ9
SUM OF ALL RESPONSES TO PHQ QUESTIONS 1-9: 0
2. FEELING DOWN, DEPRESSED OR HOPELESS: 0
1. LITTLE INTEREST OR PLEASURE IN DOING THINGS: 0
SUM OF ALL RESPONSES TO PHQ9 QUESTIONS 1 & 2: 0
SUM OF ALL RESPONSES TO PHQ QUESTIONS 1-9: 0

## 2022-10-31 ASSESSMENT — LIFESTYLE VARIABLES: HOW MANY STANDARD DRINKS CONTAINING ALCOHOL DO YOU HAVE ON A TYPICAL DAY: PATIENT DOES NOT DRINK

## 2022-10-31 NOTE — PROGRESS NOTES
Fuentes Murray MD  [address] [facility] [address]  Ph No:  [phone]  Fax:  [phone]:  Chief Complaint   Patient presents with    Medicare AW     Pt in for RT foot wound that has stitches to be checked. Pt went [facility] last night to have it looked at, but left after have been there for hours. HISTORY OF PRESENT ILLNESS:  Mr. Emili Cook is a 80 y.o. male. Pt presents for Medicare Wellness visit. Pt presents for acute care visit (crisis). Pt says had recent surgery on right great toe by surgeon. Pt says when trying to change dressing on site, he noticed it had opened up yesterday, was burst open. Pt was given 30 stiches, and he says at least 3 have opening within a week or so. Pt says has burst open and is having fluid ooze and pus. Great toes is removed, 2 toes and other remain, foot was cut down medial size, and stiches which have come out are on medial side of foot. Pt was attempting to change dressing on area and noted 2-3 of the stitches have come loose. Pt is advised since an upcoming appt with vascular, Dr. Tony Zheng and [facility], has been going for 2 months, advised to consult with MARINA Solitario (before Friday), as he may have an emerging infection. Pt is taking antibiotics, vascular surgeon has prescribed. Pt is given no other medications today, and no other medical advice,  but will have labs done today, as follows: cmp, lipid, cbc, tsh, free t4, hgb1c, vit d.     Pt will RTC in 3 months, lab review again, cmp, cbc, tsh, free t4, hgba1c      HISTORY:  Allergies   Allergen Reactions    Alprazolam Shortness Of Breath    Sulfamethoxazole-Trimethoprim Angioedema     Severe reaction, affected kidney function    Glipizide Rash    Morphine Nausea And Vomiting     Past Medical History:   Diagnosis Date    Aneurysm ([address]     AAA  and right comomon iliac artery aneurysm    Arrhythmia     Benign hypertensive heart disease without CHF 6/22/2015    CAD (coronary artery disease)     Inferior MI (9/2007) and (10/27/08)  Vision BMS x 4 on 2007. stent x 9     Diabetes ([address]     oral meds; does not check blood sugars    Edema     Heart failure ([address]     Hypertension     managed with medications    Ischemic cardiomyopathy     [facility] (2007) EF 20%     [facility] (2008) EF 40%  echo (1/14/11): Mild LV dysfunction. EF 45%. Impaired relaxation. Mod. left atrial enlargement. Mild mitral/tricuspid/pulmonic regurgitation    Other diseases of lung, not elsewhere classified     CT of chest 8/2010:  5 mm right upper lobe pulmonary nodule. 1 mm nodule in left upper lobe. .  CT chest (5/7/12) Stable tiny right upper lobe pulmonary nodule suggesting benign etiology.       Toe amputee ([address]     Lt toe     Past Surgical History:   Procedure Laterality Date    CARDIAC CATHETERIZATION  2007 and 2008    both caths with stents    COLONOSCOPY      CORONARY ANGIOPLASTY WITH STENT PLACEMENT      stents x4 (2007) then 5 stents (2008)    LUMBAR FUSION  2009    fusion L4, L5  (Dr. Pacheco Fatima)    [address]      left toes amputation    VASCULAR SURGERY  10/2013    x3 stents aorta    VASCULAR SURGERY  01/23/2020    Left superficial femoral artery to popliteal artery bypass with PTFE graft, Exploration of left popliteal artery,Intraoperative ultrasound of left greater saphenous vein     Family History   Problem Relation Age of Onset    Diabetes Father     Hypertension Father      Social History     Socioeconomic History    Marital status:      Spouse name: Not on file    Number of children: Not on file    Years of education: Not on file    Highest education level: Not on file   Occupational History    Not on file   Tobacco Use    Smoking status: Some Days     Packs/day: 2.00     Types: Cigarettes    Smokeless tobacco: Never   Substance and Sexual Activity    Alcohol use: No     Alcohol/week: 0.0 standard drinks    Drug use: No     Types: OTC, Prescription Sexual activity: Not on file   Other Topics Concern    Not on file   Social History Narrative    Not on file     Social Determinants of Health     Financial Resource Strain: Not on file   Food Insecurity: Not on file   Transportation Needs: Not on file   Physical Activity: Insufficiently Active    Days of Exercise per Week: 7 days    Minutes of Exercise per Session: 10 min   Stress: Not on file   Social Connections: Not on file   Intimate Partner Violence: Not on file   Housing Stability: Not on file     Current Outpatient Medications   Medication Sig Dispense Refill    clopidogrel (PLAVIX) 75 MG tablet Take 1 tablet by mouth daily 90 tablet 3    lisinopril-hydroCHLOROthiazide (PRINZIDE;ZESTORETIC) 20-25 MG per tablet Take 1 tablet by mouth daily 90 tablet 3    metoprolol succinate (TOPROL XL) 50 MG extended release tablet Take 1 tablet by mouth daily 90 tablet 3    tamsulosin (FLOMAX) 0.4 MG capsule Take 1 capsule by mouth daily 90 capsule 3    gabapentin (NEURONTIN) 600 MG tablet 2 tablets po three times daily Indications: neuropathic pain      metFORMIN (GLUCOPHAGE) 1000 MG tablet Take 1,000 mg by mouth 2 times daily (with meals)      nitroGLYCERIN (NITROSTAT) 0.4 MG SL tablet Place 0.4 mg under the tongue      oxyCODONE (ROXICODONE) 20 MG immediate release tablet Take 20 mg by mouth every 4 hours as needed. warfarin (COUMADIN) 1 MG tablet Take 1 mg by mouth daily       No current facility-administered medications for this visit. REVIEW OF SYSTEMS:  Review of systems is as indicated in HPI otherwise negative. PHYSICAL EXAM:  Vital Signs - BP (!) 113/54   Pulse (!) 41   Temp 97.8 °F (36.6 °C) (Temporal)   Ht 6' 3\" (1.905 m)   Wt 190 lb (86.2 kg)   SpO2 98%   BMI 23.75 kg/m²      Physical Exam  Vitals and nursing note reviewed. Constitutional:       General: He is in acute distress. Appearance: Normal appearance. He is normal weight.       Comments: See HPI,pt presents for acute care visit, surgery on right foot, right great toe removal, but open stitches, oozing, pt to see surgeon asap. HENT:      Head: Normocephalic and atraumatic. Nose: Nose normal.      Mouth/Throat:      Mouth: Mucous membranes are moist.      Pharynx: Oropharynx is clear. Eyes:      Comments: Pt says blind since cataract surgery, can only see forms of persons, cannot drive. Requires assistance many areas. Cardiovascular:      Rate and Rhythm: Normal rate and regular rhythm. Pulses: Normal pulses. Heart sounds: Normal heart sounds. Pulmonary:      Effort: Pulmonary effort is normal.      Comments: Coarse breath sounds, but otherwise mostly clear to ascultation bilaterally. Abdominal:      General: Abdomen is flat. Bowel sounds are normal.      Palpations: Abdomen is soft. Musculoskeletal:         General: Tenderness present. Cervical back: Normal range of motion and neck supple. Comments: Right great toe surgery, see HPI, slow ambulation   Skin:     General: Skin is warm. Capillary Refill: Capillary refill takes 2 to 3 seconds. Comments: Surgical site, right great toe removed, with secondary scarring, stitches, some ooze, see HPI, ? Of infection, loss of 3 stitches. Neurological:      General: No focal deficit present. Mental Status: He is alert and oriented to person, place, and time. Sensory: Sensory deficit present. Comments: Diabetic peripheral neuropathy, right>left foot   Psychiatric:         Behavior: Behavior normal.         Thought Content: Thought content normal.         Judgment: Judgment normal.      Comments: Acute and general health anxiety, depression at loss of eyesight, failed surgery for cataracts. LABS  No results found for this visit on 10/31/22. IMPRESSION/PLAN     Diagnosis Orders   1. Medicare annual wellness visit, subsequent        2.  Essential hypertension with goal blood pressure less than 140/90  Comprehensive Metabolic Panel      3. Type 2 diabetes mellitus with diabetic neuropathy, with long-term current use of insulin (HCC)  CBC with Auto Differential    Comprehensive Metabolic Panel    Hemoglobin A1C    TSH    T4, Free    Lipid Panel      4. Polyneuropathy associated with underlying disease ([address]        5. Vitamin D deficiency disease  Vitamin D 25 Hydroxy      6. Blindness of both eyes            No follow-up provider specified. Javier Tavarez MD            Dictated using voice recognition software. Proofread, but unrecognized voice recognition errors may exist.      Medicare Annual Wellness Visit    Natalia Brennan is here for Medicare AWV (Pt in for RT foot wound that has stitches to be checked. Pt went [employer] last night to have it looked at, but left after have been there for hours.)    Assessment & Plan   Medicare annual wellness visit, subsequent  Essential hypertension with goal blood pressure less than 140/90  -     Comprehensive Metabolic Panel; Future  Type 2 diabetes mellitus with diabetic neuropathy, with long-term current use of insulin (HCC)  -     CBC with Auto Differential; Future  -     Comprehensive Metabolic Panel; Future  -     Hemoglobin A1C; Future  -     TSH; Future  -     T4, Free; Future  -     Lipid Panel; Future  Polyneuropathy associated with underlying disease [address] [address]  Vitamin D deficiency disease  -     Vitamin D 25 Hydroxy; Future  Blindness of both eyes    Recommendations for Preventive Services Due: see orders and patient instructions/AVS.  Recommended screening schedule for the next 5-10 years is provided to the patient in written form: see Patient Instructions/AVS.     Return in 3 months (on 1/31/2023), or cmp, lipid, cbc, tsh, free t4, hgba1c, vit d, for Medicare Annual Wellness Visit in 1 year, for labs one week before appt.      Subjective   The following acute and/or chronic problems were also addressed today:  See HPI, concerns for surgical site, right foot, wound care and other concerns. Patient's complete Health Risk Assessment and screening values have been reviewed and are found in Flowsheets. The following problems were reviewed today and where indicated follow up appointments were made and/or referrals ordered. Positive Risk Factor Screenings with Interventions:    Fall Risk:  Do you feel unsteady or are you worried about falling? : (!) yes  2 or more falls in past year?: no  Fall with injury in past year?: no   Fall Risk Interventions:    Patient declines any further evaluation/treatment for this issue      Tobacco Use:  Tobacco Use: High Risk    Smoking Tobacco Use: Some Days    Smokeless Tobacco Use: Never    Passive Exposure: Not on file     E-cigarette/Vaping       Questions Responses    E-cigarette/Vaping Use     Start Date     Passive Exposure     Quit Date     Counseling Given     Comments           Substance Use - Tobacco Interventions:  Pt will consider smoking cessation         Opioid Risk: (Low risk score <55) Opioid risk score: 49    Patient is low risk for opioid use disorder or overdose. Last PDMP Jarad Galvan as Reviewed:  Review User Review Instant Review Result                 General Health and ACP:  General  In general, how would you say your health is?: (!) Poor  In the past 7 days, have you experienced any of the following: New or Increased Pain, New or Increased Fatigue, Loneliness, Social Isolation, Stress or Anger?: No  Do you get the social and emotional support that you need?: Yes  Do you have a Living Will?: (!) No    Advance Directives       Power of [address] [place] [address] Will ACP-Advance Directive ACP-Power of     Not on File Not on File Not on File Not on File          General Health Risk Interventions:  Pt will see surgeon, trying to save right foot from further surgery.     Health Habits/Nutrition:  Physical Activity: Insufficiently Active    Days of Exercise per Week: 7 days    Minutes of Exercise per Session: 10 min     Have you lost any weight without trying in the past 3 months?: (!) Yes (Pt don't know why he keeps lossing weight and do know why.)  Body mass index: 23.75  Have you seen the dentist within the past year?: (!) No  Health Habits/Nutrition Interventions:  Diabetic diet    Hearing/Vision:  Do you or your family notice any trouble with your hearing that hasn't been managed with hearing aids?: No  Do you have difficulty driving, watching TV, or doing any of your daily activities because of your eyesight?: No  Have you had an eye exam within the past year?: (!) No  No results found. Hearing/Vision Interventions:  Pt is legally blind, seeing ophthalmology    Safety:  Do you have working smoke detectors?: Yes  Do you have any tripping hazards - loose or unsecured carpets or rugs?: No  Do you have any tripping hazards - clutter in doorways, halls, or stairs?: No  Do you have either shower bars, grab bars, non-slip mats or non-slip surfaces in your shower or bathtub?: (!) No  Do all of your stairways have a railing or banister?: (!) No  Do you always fasten your seatbelt when you are in a car?: Yes  Safety Interventions:  Pt requires ongoing care due to blindness, helper to drive,other assistance. Objective   Vitals:    10/31/22 0937   BP: (!) 113/54   Pulse: (!) 41   Temp: 97.8 °F (36.6 °C)   TempSrc: Temporal   SpO2: 98%   Weight: 190 lb (86.2 kg)   Height: 6' 3\" (1.905 m)      Body mass index is 23.75 kg/m². Allergies   Allergen Reactions    Alprazolam Shortness Of Breath    Sulfamethoxazole-Trimethoprim Angioedema     Severe reaction, affected kidney function    Glipizide Rash    Morphine Nausea And Vomiting     Prior to Visit Medications    Medication Sig Taking?  Authorizing Provider   clopidogrel (PLAVIX) 75 MG tablet Take 1 tablet by mouth daily Yes Alysha Mendoza MD   lisinopril-hydroCHLOROthiazide [facility]) 20-25 MG per tablet Take 1 tablet by mouth daily Yes Alysha Mendoza, MD   metoprolol succinate (TOPROL XL) 50 MG extended release tablet Take 1 tablet by mouth daily Yes Johnnie Alamo MD   tamsulosin [facility]) 0.4 MG capsule Take 1 capsule by mouth daily Yes Johnnie Alamo MD   gabapentin (NEURONTIN) 600 MG tablet 2 tablets po three times daily Indications: neuropathic pain Yes Ar Automatic Reconciliation   metFORMIN (GLUCOPHAGE) 1000 MG tablet Take 1,000 mg by mouth 2 times daily (with meals) Yes Ar Automatic Reconciliation   nitroGLYCERIN (NITROSTAT) 0.4 MG SL tablet Place 0.4 mg under the tongue Yes Ar Automatic Reconciliation   oxyCODONE (ROXICODONE) 20 MG immediate release tablet Take 20 mg by mouth every 4 hours as needed.  Yes Ar Automatic Reconciliation   warfarin (COUMADIN) 1 MG tablet Take 1 mg by mouth daily Yes Ar Automatic Reconciliation       CareTeam (Including outside providers/suppliers regularly involved in providing care):   Patient Care Team:  Johnnie Alamo MD as PCP - General  Johnnie Alamo MD as PCP - [facility] Empaneled Provider     Reviewed and updated this visit:  Tobacco  Allergies  Meds  Med Hx  Surg Hx  Soc Hx  Fam Hx

## 2022-11-01 DIAGNOSIS — E55.9 VITAMIN D DEFICIENCY: Primary | ICD-10-CM

## 2022-11-01 RX ORDER — ERGOCALCIFEROL 1.25 MG/1
50000 CAPSULE ORAL WEEKLY
Qty: 12 CAPSULE | Refills: 1 | Status: SHIPPED | OUTPATIENT
Start: 2022-11-01

## 2022-11-01 NOTE — PATIENT INSTRUCTIONS
Personalized Preventive Plan for Mason Oliva - 10/31/2022  Medicare offers a range of preventive health benefits. Some of the tests and screenings are paid in full while other may be subject to a deductible, co-insurance, and/or copay. Some of these benefits include a comprehensive review of your medical history including lifestyle, illnesses that may run in your family, and various assessments and screenings as appropriate. After reviewing your medical record and screening and assessments performed today your provider may have ordered immunizations, labs, imaging, and/or referrals for you. A list of these orders (if applicable) as well as your Preventive Care list are included within your After Visit Summary for your review. Other Preventive Recommendations:    A preventive eye exam performed by an eye specialist is recommended every 1-2 years to screen for glaucoma; cataracts, macular degeneration, and other eye disorders. A preventive dental visit is recommended every 6 months. Try to get at least 150 minutes of exercise per week or 10,000 steps per day on a pedometer . Order or download the FREE \"Exercise & Physical Activity: Your Everyday Guide\" from [employer]. Call 8-925.753.7490 or search [employer] online. You need 1887-2153 mg of calcium and 0609-3006 IU of vitamin D per day. It is possible to meet your calcium requirement with diet alone, but a vitamin D supplement is usually necessary to meet this goal.  When exposed to the sun, use a sunscreen that protects against both UVA and UVB radiation with an SPF of 30 or greater. Reapply every 2 to 3 hours or after sweating, drying off with a towel, or swimming. Always wear a seat belt when traveling in a car. Always wear a helmet when riding a bicycle or motorcycle.

## 2023-01-31 DIAGNOSIS — Z13.1 SCREENING FOR DIABETES MELLITUS: Primary | ICD-10-CM

## 2023-01-31 DIAGNOSIS — Z13.6 SCREENING FOR ISCHEMIC HEART DISEASE: ICD-10-CM

## 2023-01-31 DIAGNOSIS — R53.82 CHRONIC FATIGUE: ICD-10-CM

## 2023-01-31 DIAGNOSIS — Z13.228 SCREENING FOR PHENYLKETONURIA (PKU): ICD-10-CM

## 2023-01-31 DIAGNOSIS — E55.9 VITAMIN D DEFICIENCY DISEASE: ICD-10-CM

## 2023-01-31 DIAGNOSIS — R79.9 ABNORMAL BLOOD CHEMISTRY: ICD-10-CM

## 2023-01-31 DIAGNOSIS — Z13.1 SCREENING FOR DIABETES MELLITUS: ICD-10-CM

## 2023-01-31 LAB
25(OH)D3 SERPL-MCNC: 48.1 NG/ML (ref 30–100)
ALBUMIN SERPL-MCNC: 3.1 G/DL (ref 3.2–4.6)
ALBUMIN/GLOB SERPL: 0.6 (ref 0.4–1.6)
ALP SERPL-CCNC: 93 U/L (ref 50–136)
ALT SERPL-CCNC: 15 U/L (ref 12–65)
ANION GAP SERPL CALC-SCNC: 6 MMOL/L (ref 2–11)
AST SERPL-CCNC: 13 U/L (ref 15–37)
BILIRUB SERPL-MCNC: 0.3 MG/DL (ref 0.2–1.1)
BUN SERPL-MCNC: 32 MG/DL (ref 8–23)
CALCIUM SERPL-MCNC: 9.2 MG/DL (ref 8.3–10.4)
CHLORIDE SERPL-SCNC: 113 MMOL/L (ref 101–110)
CHOLEST SERPL-MCNC: 111 MG/DL
CO2 SERPL-SCNC: 21 MMOL/L (ref 21–32)
CREAT SERPL-MCNC: 1.4 MG/DL (ref 0.8–1.5)
GLOBULIN SER CALC-MCNC: 5.1 G/DL (ref 2.8–4.5)
GLUCOSE SERPL-MCNC: 119 MG/DL (ref 65–100)
HDLC SERPL-MCNC: 33 MG/DL (ref 40–60)
HDLC SERPL: 3.4
LDLC SERPL CALC-MCNC: 54.6 MG/DL
POTASSIUM SERPL-SCNC: 4.8 MMOL/L (ref 3.5–5.1)
PROT SERPL-MCNC: 8.2 G/DL (ref 6.3–8.2)
SODIUM SERPL-SCNC: 140 MMOL/L (ref 133–143)
T4 FREE SERPL-MCNC: 1 NG/DL (ref 0.78–1.46)
TRIGL SERPL-MCNC: 117 MG/DL (ref 35–150)
VLDLC SERPL CALC-MCNC: 23.4 MG/DL (ref 6–23)

## 2023-02-01 ENCOUNTER — TELEPHONE (OUTPATIENT)
Dept: PRIMARY CARE CLINIC | Facility: CLINIC | Age: 73
End: 2023-02-01

## 2023-02-02 DIAGNOSIS — Z13.228 SCREENING FOR PHENYLKETONURIA (PKU): ICD-10-CM

## 2023-02-02 DIAGNOSIS — Z13.1 SCREENING FOR DIABETES MELLITUS: Primary | ICD-10-CM

## 2023-02-02 DIAGNOSIS — R79.9 ABNORMAL BLOOD CHEMISTRY: ICD-10-CM

## 2023-02-02 DIAGNOSIS — Z13.6 SCREENING FOR ISCHEMIC HEART DISEASE: ICD-10-CM

## 2023-02-02 DIAGNOSIS — R53.82 CHRONIC FATIGUE: ICD-10-CM

## 2023-02-02 DIAGNOSIS — Z13.1 SCREENING FOR DIABETES MELLITUS: ICD-10-CM

## 2023-02-02 LAB
BASOPHILS # BLD: 0.1 K/UL (ref 0–0.2)
BASOPHILS NFR BLD: 1 % (ref 0–2)
DIFFERENTIAL METHOD BLD: ABNORMAL
EOSINOPHIL # BLD: 0.2 K/UL (ref 0–0.8)
EOSINOPHIL NFR BLD: 4 % (ref 0.5–7.8)
ERYTHROCYTE [DISTWIDTH] IN BLOOD BY AUTOMATED COUNT: 16.9 % (ref 11.9–14.6)
EST. AVERAGE GLUCOSE BLD GHB EST-MCNC: 140 MG/DL
HBA1C MFR BLD: 6.5 % (ref 4.8–5.6)
HCT VFR BLD AUTO: 43.4 % (ref 41.1–50.3)
HGB BLD-MCNC: 13.2 G/DL (ref 13.6–17.2)
IMM GRANULOCYTES # BLD AUTO: 0 K/UL (ref 0–0.5)
IMM GRANULOCYTES NFR BLD AUTO: 0 % (ref 0–5)
LYMPHOCYTES # BLD: 1.8 K/UL (ref 0.5–4.6)
LYMPHOCYTES NFR BLD: 28 % (ref 13–44)
MCH RBC QN AUTO: 27.4 PG (ref 26.1–32.9)
MCHC RBC AUTO-ENTMCNC: 30.4 G/DL (ref 31.4–35)
MCV RBC AUTO: 90.2 FL (ref 82–102)
MONOCYTES # BLD: 0.5 K/UL (ref 0.1–1.3)
MONOCYTES NFR BLD: 8 % (ref 4–12)
NEUTS SEG # BLD: 3.8 K/UL (ref 1.7–8.2)
NEUTS SEG NFR BLD: 59 % (ref 43–78)
NRBC # BLD: 0 K/UL (ref 0–0.2)
PLATELET # BLD AUTO: 86 K/UL (ref 150–450)
PMV BLD AUTO: ABNORMAL FL (ref 9.4–12.3)
RBC # BLD AUTO: 4.81 M/UL (ref 4.23–5.6)
TSH, 3RD GENERATION: 1.49 UIU/ML (ref 0.36–3.74)
WBC # BLD AUTO: 6.3 K/UL (ref 4.3–11.1)

## 2023-02-23 ENCOUNTER — TELEPHONE (OUTPATIENT)
Dept: PRIMARY CARE CLINIC | Facility: CLINIC | Age: 73
End: 2023-02-23

## 2023-02-23 NOTE — TELEPHONE ENCOUNTER
----- Message from Mary Anne Fuson sent at 2/16/2023  1:40 PM EST -----  Subject: Appointment Request    Reason for Call: Established Patient Appointment needed: Routine Existing   Condition Follow Up (Diabetes)    QUESTIONS    Reason for appointment request? No appointments available during search     Additional Information for Provider? pt needs to be seen in office. No in   office appts were available at the time of the call. Pt is vision impaired   and needs to be seen in the office.   ---------------------------------------------------------------------------  --------------  CALL BACK INFO  [phone]; OK to leave message on voicemail  ---------------------------------------------------------------------------  --------------  SCRIPT ANSWERS  COVID Screen: Green

## 2023-04-04 ENCOUNTER — TELEPHONE (OUTPATIENT)
Dept: PRIMARY CARE CLINIC | Facility: CLINIC | Age: 73
End: 2023-04-04

## 2023-04-05 ENCOUNTER — TELEMEDICINE (OUTPATIENT)
Dept: PRIMARY CARE CLINIC | Facility: CLINIC | Age: 73
End: 2023-04-05
Payer: MEDICARE

## 2023-04-05 DIAGNOSIS — G89.29 CHRONIC RIGHT-SIDED LOW BACK PAIN WITHOUT SCIATICA: ICD-10-CM

## 2023-04-05 DIAGNOSIS — I20.9 ANGINA PECTORIS, UNSPECIFIED (HCC): ICD-10-CM

## 2023-04-05 DIAGNOSIS — M54.50 CHRONIC RIGHT-SIDED LOW BACK PAIN WITHOUT SCIATICA: ICD-10-CM

## 2023-04-05 DIAGNOSIS — I73.9 PAD (PERIPHERAL ARTERY DISEASE) (HCC): ICD-10-CM

## 2023-04-05 DIAGNOSIS — G63 POLYNEUROPATHY ASSOCIATED WITH UNDERLYING DISEASE (HCC): Primary | ICD-10-CM

## 2023-04-05 DIAGNOSIS — I50.22 CHRONIC SYSTOLIC CONGESTIVE HEART FAILURE (HCC): ICD-10-CM

## 2023-04-05 DIAGNOSIS — I99.8 ISCHEMIA OF LEFT LOWER EXTREMITY: ICD-10-CM

## 2023-04-05 DIAGNOSIS — E55.9 VITAMIN D DEFICIENCY: ICD-10-CM

## 2023-04-05 DIAGNOSIS — F17.200 CURRENT SMOKER: ICD-10-CM

## 2023-04-05 DIAGNOSIS — E11.40 TYPE 2 DIABETES MELLITUS WITH DIABETIC NEUROPATHY, WITH LONG-TERM CURRENT USE OF INSULIN (HCC): ICD-10-CM

## 2023-04-05 DIAGNOSIS — Z95.828 H/O EXTREMITY BYPASS GRAFT: ICD-10-CM

## 2023-04-05 DIAGNOSIS — Z79.4 TYPE 2 DIABETES MELLITUS WITH DIABETIC NEUROPATHY, WITH LONG-TERM CURRENT USE OF INSULIN (HCC): ICD-10-CM

## 2023-04-05 PROCEDURE — 99442 PR PHYS/QHP TELEPHONE EVALUATION 11-20 MIN: CPT | Performed by: FAMILY MEDICINE

## 2023-04-05 RX ORDER — OXYCODONE HYDROCHLORIDE 20 MG/1
20 TABLET ORAL EVERY 4 HOURS PRN
Qty: 150 TABLET | Refills: 0 | Status: SHIPPED | OUTPATIENT
Start: 2023-04-05 | End: 2023-05-05

## 2023-04-05 RX ORDER — ERGOCALCIFEROL 1.25 MG/1
50000 CAPSULE ORAL WEEKLY
Qty: 12 CAPSULE | Refills: 1 | Status: SHIPPED | OUTPATIENT
Start: 2023-04-05

## 2023-04-05 RX ORDER — NITROGLYCERIN 0.4 MG/1
0.4 TABLET SUBLINGUAL EVERY 5 MIN PRN
Qty: 25 TABLET | Refills: 5 | Status: SHIPPED | OUTPATIENT
Start: 2023-04-05

## 2023-04-05 SDOH — ECONOMIC STABILITY: INCOME INSECURITY: HOW HARD IS IT FOR YOU TO PAY FOR THE VERY BASICS LIKE FOOD, HOUSING, MEDICAL CARE, AND HEATING?: SOMEWHAT HARD

## 2023-04-05 SDOH — ECONOMIC STABILITY: HOUSING INSECURITY
IN THE LAST 12 MONTHS, WAS THERE A TIME WHEN YOU DID NOT HAVE A STEADY PLACE TO SLEEP OR SLEPT IN A SHELTER (INCLUDING NOW)?: NO

## 2023-04-05 SDOH — ECONOMIC STABILITY: FOOD INSECURITY: WITHIN THE PAST 12 MONTHS, YOU WORRIED THAT YOUR FOOD WOULD RUN OUT BEFORE YOU GOT MONEY TO BUY MORE.: NEVER TRUE

## 2023-04-05 SDOH — ECONOMIC STABILITY: FOOD INSECURITY: WITHIN THE PAST 12 MONTHS, THE FOOD YOU BOUGHT JUST DIDN'T LAST AND YOU DIDN'T HAVE MONEY TO GET MORE.: NEVER TRUE

## 2023-04-05 ASSESSMENT — PATIENT HEALTH QUESTIONNAIRE - PHQ9
SUM OF ALL RESPONSES TO PHQ QUESTIONS 1-9: 0
1. LITTLE INTEREST OR PLEASURE IN DOING THINGS: 0
SUM OF ALL RESPONSES TO PHQ QUESTIONS 1-9: 0
2. FEELING DOWN, DEPRESSED OR HOPELESS: 0
SUM OF ALL RESPONSES TO PHQ9 QUESTIONS 1 & 2: 0

## 2023-04-05 NOTE — PROGRESS NOTES
Becky Cazares is a 71 y.o. male evaluated via telephone on 4/5/2023 for No chief complaint on file. .        Documentation:  I communicated with the patient and/or health care decision maker about medication refills. Pt says is doing okay, hole in right foot, and if not healed by 11th, wound vac placed in it. Pt says insurance company wanted $350 to have a home. Pt is now taking two very strong antibiotics. Labs are normal.  Pt needs refills on medications. Pt has lab work done and was to come back. for review, asfollows:  GFR 54. Ckd stage 3a stable pt totake plenty of fluids. Fasting glucose 119, hgba1c 6.5%, controlled diabetes  Total cholesterol 111, hdl 33, ldl 54.6 ratio of cardiac risk 3.4    Liver enzymes normal alt15, ast 13  Thyroid normal tsh 1.490, free t4 1.0  Vit d normal 48.1, pt to continue current therapy. Hgb 13.2, normal, mcv 90.4 normal.    Platelet count low 89D    Pt has diabetic foot ulcer (from severe peripheral vascular disease and peripheral neuropathy)secondary to diabetes, and is being seen by wound care/vascular. Pt reports chronic peripheral neuropathic pain, and chronic lower back pain,seen for a long period by pain management. Pt was seeing a pain management physician who had to retire due to cancer. Pt says he was replaced by a NP who felt he needed to take a different kind of medication than he had used for years. Pt was told he was being switched to [address]. (Tapentadol)  Pt reports he appealed to the NP provider 5 times requesting he change to something else, but he refused. Cost of medication to patient is $2700. Insurance also appealed and was denied. Isa then appealed to PCP to take care of patient until he could be placed in another pain management group (see correspondence 4/4/23) and he has agreed to do so.       Details of this discussion including any medical advice provided:  Pt is to be referred to another pain management group, and PCP will

## 2023-05-01 ENCOUNTER — TELEPHONE (OUTPATIENT)
Dept: PRIMARY CARE CLINIC | Facility: CLINIC | Age: 73
End: 2023-05-01

## 2023-05-01 DIAGNOSIS — G63 POLYNEUROPATHY ASSOCIATED WITH UNDERLYING DISEASE (HCC): ICD-10-CM

## 2023-05-01 RX ORDER — OXYCODONE HYDROCHLORIDE 20 MG/1
20 TABLET ORAL EVERY 4 HOURS PRN
Qty: 15 TABLET | Refills: 0 | Status: SHIPPED | OUTPATIENT
Start: 2023-05-06 | End: 2023-05-09

## 2023-05-01 NOTE — TELEPHONE ENCOUNTER
Requesting a refill for oxycodone, has a VV on Monday but runs out on Saturday. Pt is aware doctor is out of the office.

## 2023-05-08 ENCOUNTER — TELEMEDICINE (OUTPATIENT)
Dept: PRIMARY CARE CLINIC | Facility: CLINIC | Age: 73
End: 2023-05-08
Payer: MEDICARE

## 2023-05-08 DIAGNOSIS — E11.21 TYPE 2 DIABETES WITH NEPHROPATHY (HCC): ICD-10-CM

## 2023-05-08 DIAGNOSIS — G63 POLYNEUROPATHY ASSOCIATED WITH UNDERLYING DISEASE (HCC): ICD-10-CM

## 2023-05-08 DIAGNOSIS — G89.29 CHRONIC BILATERAL LOW BACK PAIN, UNSPECIFIED WHETHER SCIATICA PRESENT: ICD-10-CM

## 2023-05-08 DIAGNOSIS — M86.171 OTHER ACUTE OSTEOMYELITIS OF RIGHT FOOT (HCC): ICD-10-CM

## 2023-05-08 DIAGNOSIS — M54.50 CHRONIC BILATERAL LOW BACK PAIN, UNSPECIFIED WHETHER SCIATICA PRESENT: ICD-10-CM

## 2023-05-08 DIAGNOSIS — F51.01 PRIMARY INSOMNIA: Primary | ICD-10-CM

## 2023-05-08 DIAGNOSIS — J30.9 ALLERGIC RHINITIS, UNSPECIFIED SEASONALITY, UNSPECIFIED TRIGGER: ICD-10-CM

## 2023-05-08 PROBLEM — M86.9 PYOGENIC INFLAMMATION OF BONE (HCC): Status: ACTIVE | Noted: 2023-05-08

## 2023-05-08 PROCEDURE — 99442 PR PHYS/QHP TELEPHONE EVALUATION 11-20 MIN: CPT | Performed by: FAMILY MEDICINE

## 2023-05-08 RX ORDER — OXYCODONE HYDROCHLORIDE 30 MG/1
30 TABLET ORAL EVERY 6 HOURS PRN
Qty: 120 TABLET | Refills: 0 | Status: SHIPPED | OUTPATIENT
Start: 2023-05-08 | End: 2023-06-07

## 2023-05-08 RX ORDER — TRAZODONE HYDROCHLORIDE 100 MG/1
100 TABLET ORAL NIGHTLY
Qty: 90 TABLET | Refills: 1 | Status: SHIPPED | OUTPATIENT
Start: 2023-05-08

## 2023-05-08 RX ORDER — FLUTICASONE PROPIONATE 50 MCG
1 SPRAY, SUSPENSION (ML) NASAL DAILY
Qty: 16 G | Refills: 5 | Status: SHIPPED | OUTPATIENT
Start: 2023-05-08

## 2023-05-08 ASSESSMENT — PATIENT HEALTH QUESTIONNAIRE - PHQ9
1. LITTLE INTEREST OR PLEASURE IN DOING THINGS: 0
2. FEELING DOWN, DEPRESSED OR HOPELESS: 0
SUM OF ALL RESPONSES TO PHQ QUESTIONS 1-9: 0
SUM OF ALL RESPONSES TO PHQ9 QUESTIONS 1 & 2: 0
SUM OF ALL RESPONSES TO PHQ QUESTIONS 1-9: 0

## 2023-05-08 NOTE — PROGRESS NOTES
Richa Cantu is a 71 y.o. male evaluated via telephone on 5/8/2023 for Follow-up (Pt visit today is for 1 month follow up.)  . Documentation:  I communicated with the patient and/or health care decision maker about medication refills, hospital follow up. .     Pt has just left pain management and will come back on June 7 for his first appt. Pt reports amputation for part of  right foot, due to osteomyelitis. Pt has chronic back pain. Pt no longer endorses any pain in right hernia into testicular area and has not had any surgery of same. Pt is requested the oxycodone be increased to 30mg every 6 hour dose and reduction of number of tablets from 150 to 12) monthly. Pt is requesting something for sleep. Advised we are not using klonopin, restoril or ambien, due to potential interaction with pain meds. Pt is given 100mg trazodone which can be increased to a max of 300mg in pm.    Pt will RTC in 1 months recheck. Details of this discussion including any medical advice provided: as noted    Total Time: minutes: 11-20 minutes    Richa Cantu was evaluated through a synchronous (real-time) audio encounter. Patient identification was verified at the start of the visit. He (or guardian if applicable) is aware that this is a billable service, which includes applicable co-pays. This visit was conducted with the patient's (and/or legal guardian's) verbal consent. He has not had a related appointment within my department in the past 7 days or scheduled within the next 24 hours. The patient was located at Home: [address] [place] [address]. The provider was located at [facility] ([facility]): [address],  [facility]  [facility]..     Note: not billable if this call serves to triage the patient into an appointment for the relevant concern    Diamante Alejandra MD

## 2023-05-09 ENCOUNTER — TELEPHONE (OUTPATIENT)
Dept: PRIMARY CARE CLINIC | Facility: CLINIC | Age: 73
End: 2023-05-09

## 2023-05-23 ENCOUNTER — TELEPHONE (OUTPATIENT)
Dept: PRIMARY CARE CLINIC | Facility: CLINIC | Age: 73
End: 2023-05-23

## 2023-05-23 NOTE — TELEPHONE ENCOUNTER
[address] [facility],Third Floor [phone]- calling to let know she couldn't start care due to pt declining because of 2 appointments today. Wants to make sure its okay to go Friday. Please call back.

## 2023-06-08 ENCOUNTER — OFFICE VISIT (OUTPATIENT)
Dept: PRIMARY CARE CLINIC | Facility: CLINIC | Age: 73
End: 2023-06-08
Payer: MEDICARE

## 2023-06-08 VITALS
TEMPERATURE: 98 F | HEART RATE: 53 BPM | OXYGEN SATURATION: 99 % | BODY MASS INDEX: 25.86 KG/M2 | SYSTOLIC BLOOD PRESSURE: 145 MMHG | HEIGHT: 75 IN | DIASTOLIC BLOOD PRESSURE: 66 MMHG | WEIGHT: 208 LBS

## 2023-06-08 DIAGNOSIS — I25.119 ATHEROSCLEROSIS OF NATIVE CORONARY ARTERY OF NATIVE HEART WITH ANGINA PECTORIS (HCC): ICD-10-CM

## 2023-06-08 DIAGNOSIS — N18.30 STAGE 3 CHRONIC KIDNEY DISEASE, UNSPECIFIED WHETHER STAGE 3A OR 3B CKD (HCC): ICD-10-CM

## 2023-06-08 DIAGNOSIS — F51.01 PRIMARY INSOMNIA: ICD-10-CM

## 2023-06-08 DIAGNOSIS — Z79.4 TYPE 2 DIABETES MELLITUS WITH DIABETIC NEUROPATHY, WITH LONG-TERM CURRENT USE OF INSULIN (HCC): ICD-10-CM

## 2023-06-08 DIAGNOSIS — I25.118 CORONARY ARTERY DISEASE OF NATIVE HEART WITH STABLE ANGINA PECTORIS, UNSPECIFIED VESSEL OR LESION TYPE (HCC): ICD-10-CM

## 2023-06-08 DIAGNOSIS — N40.1 BENIGN PROSTATIC HYPERPLASIA WITH URINARY FREQUENCY: ICD-10-CM

## 2023-06-08 DIAGNOSIS — R35.0 BENIGN PROSTATIC HYPERPLASIA WITH URINARY FREQUENCY: ICD-10-CM

## 2023-06-08 DIAGNOSIS — E11.40 TYPE 2 DIABETES MELLITUS WITH DIABETIC NEUROPATHY, WITH LONG-TERM CURRENT USE OF INSULIN (HCC): Primary | ICD-10-CM

## 2023-06-08 DIAGNOSIS — Z79.4 TYPE 2 DIABETES MELLITUS WITH DIABETIC NEUROPATHY, WITH LONG-TERM CURRENT USE OF INSULIN (HCC): Primary | ICD-10-CM

## 2023-06-08 DIAGNOSIS — I10 ESSENTIAL HYPERTENSION WITH GOAL BLOOD PRESSURE LESS THAN 140/90: ICD-10-CM

## 2023-06-08 DIAGNOSIS — F17.200 CURRENT SMOKER: ICD-10-CM

## 2023-06-08 DIAGNOSIS — Z89.431 S/P TRANSMETATARSAL AMPUTATION OF FOOT, RIGHT (HCC): ICD-10-CM

## 2023-06-08 DIAGNOSIS — E11.40 TYPE 2 DIABETES MELLITUS WITH DIABETIC NEUROPATHY, WITH LONG-TERM CURRENT USE OF INSULIN (HCC): ICD-10-CM

## 2023-06-08 PROCEDURE — 3077F SYST BP >= 140 MM HG: CPT | Performed by: FAMILY MEDICINE

## 2023-06-08 PROCEDURE — G8419 CALC BMI OUT NRM PARAM NOF/U: HCPCS | Performed by: FAMILY MEDICINE

## 2023-06-08 PROCEDURE — 1123F ACP DISCUSS/DSCN MKR DOCD: CPT | Performed by: FAMILY MEDICINE

## 2023-06-08 PROCEDURE — G8427 DOCREV CUR MEDS BY ELIG CLIN: HCPCS | Performed by: FAMILY MEDICINE

## 2023-06-08 PROCEDURE — 2022F DILAT RTA XM EVC RTNOPTHY: CPT | Performed by: FAMILY MEDICINE

## 2023-06-08 PROCEDURE — 3017F COLORECTAL CA SCREEN DOC REV: CPT | Performed by: FAMILY MEDICINE

## 2023-06-08 PROCEDURE — 3078F DIAST BP <80 MM HG: CPT | Performed by: FAMILY MEDICINE

## 2023-06-08 PROCEDURE — 3044F HG A1C LEVEL LT 7.0%: CPT | Performed by: FAMILY MEDICINE

## 2023-06-08 PROCEDURE — 99214 OFFICE O/P EST MOD 30 MIN: CPT | Performed by: FAMILY MEDICINE

## 2023-06-08 PROCEDURE — 4004F PT TOBACCO SCREEN RCVD TLK: CPT | Performed by: FAMILY MEDICINE

## 2023-06-08 RX ORDER — NITROGLYCERIN 0.4 MG/1
0.4 TABLET SUBLINGUAL EVERY 5 MIN PRN
Qty: 25 TABLET | Refills: 5 | Status: SHIPPED | OUTPATIENT
Start: 2023-06-08

## 2023-06-08 RX ORDER — LISINOPRIL AND HYDROCHLOROTHIAZIDE 25; 20 MG/1; MG/1
1 TABLET ORAL DAILY
Qty: 90 TABLET | Refills: 3 | Status: SHIPPED | OUTPATIENT
Start: 2023-06-08

## 2023-06-08 RX ORDER — GABAPENTIN 600 MG/1
TABLET ORAL
Qty: 540 TABLET | Refills: 3 | Status: SHIPPED | OUTPATIENT
Start: 2023-06-08 | End: 2023-08-08

## 2023-06-08 RX ORDER — TAMSULOSIN HYDROCHLORIDE 0.4 MG/1
0.4 CAPSULE ORAL DAILY
Qty: 90 CAPSULE | Refills: 3 | Status: SHIPPED | OUTPATIENT
Start: 2023-06-08

## 2023-06-08 RX ORDER — CLOPIDOGREL BISULFATE 75 MG/1
75 TABLET ORAL DAILY
Qty: 90 TABLET | Refills: 3 | Status: SHIPPED | OUTPATIENT
Start: 2023-06-08

## 2023-06-08 RX ORDER — OXYCODONE HYDROCHLORIDE 20 MG/1
20 TABLET ORAL EVERY 4 HOURS PRN
Qty: 150 TABLET | Refills: 0
Start: 2023-06-08 | End: 2023-07-08

## 2023-06-08 RX ORDER — METOPROLOL SUCCINATE 50 MG/1
50 TABLET, EXTENDED RELEASE ORAL DAILY
Qty: 90 TABLET | Refills: 3 | Status: SHIPPED | OUTPATIENT
Start: 2023-06-08

## 2023-06-08 NOTE — PROGRESS NOTES
Evonne Tyler MD  [address] [facility] [address], [place]  Ph No:  [phone]  Fax:  [phone]:  Chief Complaint   Patient presents with    1 Month Follow-Up     Pt in for 1 month follow up. Labs Only     Pt wants labs drawn. HISTORY OF PRESENT ILLNESS:  Mr. Lanny Richardson is a 71 y.o. male. Pt has had surgery on right foot and lost about 1/2 of foot 26th. Pt is having the Home [facility] from [facility] and to change wound dressings. Pt is treated his own foot for a few weeks as no one came to his home. Pt says the [facility] has only changed one time, but they are planning to come more regularly. Pt is will be taking antibiotics through a PIC line until July 14th, vancomycin IV. Pt reports the right foot heel and base has been saved. Pt still has ability to balance and to walk. Pt is home bound and cannot drive and is also blind from failed cataract surgery. Pt presents for medication refills. Pt reports he is out of several meds. Pt is given refills of the following:  Lisinopril-hctz and toprol for BP management. BP is stable 145/66, pt to continue current therapy. BP med is not adjusted today as pt is in pain and just settling into visit. Plavix for blood thinner (due to surgery)  Flomax for BPH with urinary hesitancy  Nitroglycerin for cardiac, in event of angina  Oxycodone is given by pain management, recorded in chart for record. (Pt is reduced to 20mg dose from 30 mg dose, but given additional tablets +30).     Pt is advised RTC in 3 months, cmp, lipid, cbc, tsh, free t4, hgba1c, vit d        HISTORY:  Allergies   Allergen Reactions    Alprazolam Shortness Of Breath    Sulfamethoxazole-Trimethoprim Angioedema     Severe reaction, affected kidney function    Glipizide Rash    Morphine Nausea And Vomiting     Past Medical History:   Diagnosis Date    Aneurysm ([address]     AAA  and right comomon iliac artery aneurysm    Arrhythmia     Benign

## 2023-06-09 LAB
EST. AVERAGE GLUCOSE BLD GHB EST-MCNC: 126 MG/DL
HBA1C MFR BLD: 6 % (ref 4.8–5.6)

## 2023-07-31 ENCOUNTER — TELEPHONE (OUTPATIENT)
Dept: PRIMARY CARE CLINIC | Facility: CLINIC | Age: 73
End: 2023-07-31

## 2023-07-31 NOTE — TELEPHONE ENCOUNTER
Spoke with yesica from [facility]. Advised that I do not see that report in chart but under history it says that Dr Kayleen Lux did the surgery in 2009.

## 2023-08-02 ENCOUNTER — OFFICE VISIT (OUTPATIENT)
Dept: PRIMARY CARE CLINIC | Facility: CLINIC | Age: 73
End: 2023-08-02
Payer: MEDICARE

## 2023-08-02 VITALS
TEMPERATURE: 97.5 F | HEART RATE: 57 BPM | SYSTOLIC BLOOD PRESSURE: 116 MMHG | BODY MASS INDEX: 23.38 KG/M2 | WEIGHT: 188 LBS | HEIGHT: 75 IN | OXYGEN SATURATION: 98 % | DIASTOLIC BLOOD PRESSURE: 64 MMHG

## 2023-08-02 DIAGNOSIS — R29.898 WEAKNESS OF BOTH LOWER EXTREMITIES: ICD-10-CM

## 2023-08-02 DIAGNOSIS — Z72.0 TOBACCO ABUSE: ICD-10-CM

## 2023-08-02 DIAGNOSIS — G89.29 CHRONIC BILATERAL LOW BACK PAIN WITH BILATERAL SCIATICA: ICD-10-CM

## 2023-08-02 DIAGNOSIS — D50.8 IRON DEFICIENCY ANEMIA SECONDARY TO INADEQUATE DIETARY IRON INTAKE: ICD-10-CM

## 2023-08-02 DIAGNOSIS — M54.41 CHRONIC BILATERAL LOW BACK PAIN WITH BILATERAL SCIATICA: ICD-10-CM

## 2023-08-02 DIAGNOSIS — N18.30 STAGE 3 CHRONIC KIDNEY DISEASE, UNSPECIFIED WHETHER STAGE 3A OR 3B CKD (HCC): ICD-10-CM

## 2023-08-02 DIAGNOSIS — E78.2 MIXED HYPERLIPIDEMIA: ICD-10-CM

## 2023-08-02 DIAGNOSIS — Z00.00 MEDICARE ANNUAL WELLNESS VISIT, SUBSEQUENT: Primary | ICD-10-CM

## 2023-08-02 DIAGNOSIS — M54.42 CHRONIC BILATERAL LOW BACK PAIN WITH BILATERAL SCIATICA: ICD-10-CM

## 2023-08-02 DIAGNOSIS — Z95.828 H/O EXTREMITY BYPASS GRAFT: ICD-10-CM

## 2023-08-02 DIAGNOSIS — Z89.431 S/P TRANSMETATARSAL AMPUTATION OF FOOT, RIGHT (HCC): ICD-10-CM

## 2023-08-02 DIAGNOSIS — G60.3 IDIOPATHIC PROGRESSIVE NEUROPATHY: ICD-10-CM

## 2023-08-02 PROCEDURE — 1123F ACP DISCUSS/DSCN MKR DOCD: CPT | Performed by: FAMILY MEDICINE

## 2023-08-02 PROCEDURE — G0439 PPPS, SUBSEQ VISIT: HCPCS | Performed by: FAMILY MEDICINE

## 2023-08-02 PROCEDURE — 3017F COLORECTAL CA SCREEN DOC REV: CPT | Performed by: FAMILY MEDICINE

## 2023-08-02 PROCEDURE — 99214 OFFICE O/P EST MOD 30 MIN: CPT | Performed by: FAMILY MEDICINE

## 2023-08-02 PROCEDURE — G8427 DOCREV CUR MEDS BY ELIG CLIN: HCPCS | Performed by: FAMILY MEDICINE

## 2023-08-02 PROCEDURE — 3074F SYST BP LT 130 MM HG: CPT | Performed by: FAMILY MEDICINE

## 2023-08-02 PROCEDURE — G8420 CALC BMI NORM PARAMETERS: HCPCS | Performed by: FAMILY MEDICINE

## 2023-08-02 PROCEDURE — 3078F DIAST BP <80 MM HG: CPT | Performed by: FAMILY MEDICINE

## 2023-08-02 PROCEDURE — 4004F PT TOBACCO SCREEN RCVD TLK: CPT | Performed by: FAMILY MEDICINE

## 2023-08-02 RX ORDER — PREGABALIN 150 MG/1
150 CAPSULE ORAL 3 TIMES DAILY
Qty: 90 CAPSULE | Refills: 2 | Status: SHIPPED | OUTPATIENT
Start: 2023-08-02 | End: 2023-10-31

## 2023-08-02 ASSESSMENT — PATIENT HEALTH QUESTIONNAIRE - PHQ9
SUM OF ALL RESPONSES TO PHQ QUESTIONS 1-9: 2
SUM OF ALL RESPONSES TO PHQ QUESTIONS 1-9: 2
2. FEELING DOWN, DEPRESSED OR HOPELESS: 1
SUM OF ALL RESPONSES TO PHQ9 QUESTIONS 1 & 2: 2
1. LITTLE INTEREST OR PLEASURE IN DOING THINGS: 1
SUM OF ALL RESPONSES TO PHQ QUESTIONS 1-9: 2
SUM OF ALL RESPONSES TO PHQ QUESTIONS 1-9: 2

## 2023-08-02 ASSESSMENT — LIFESTYLE VARIABLES: HOW MANY STANDARD DRINKS CONTAINING ALCOHOL DO YOU HAVE ON A TYPICAL DAY: PATIENT DOES NOT DRINK

## 2023-08-02 ASSESSMENT — VISUAL ACUITY
OD_CC: 20/30
OS_CC: 20/30

## 2023-08-02 NOTE — PROGRESS NOTES
Robb Davis MD  [address] Omer, [address] [facility]  Ph No:  [phone]  Fax:  [phone]:  Chief Complaint   Patient presents with    1 Month Follow-Up     Pt came for the paperwork for the [facility] scooter. Medicare AWV     Pt wants to get referral to another pain doctor. HISTORY OF PRESENT ILLNESS:  Mr. Trae Srivastava is a 71 y.o. male. Pt has Medicare Wellness visit today. Pt presents for follow up visit. Pt says is still having severe problems with peripheral neuropathy, and is having problems from waist down. Pt is seeing pain management, and PCP discussed with him use of Gabapentin high dose which he is using. Pt reports it is not working very well. Pt would like to try Lyrica for a time instead of gabapentin (these meds cannot be used together) so gabapentin is stopped and lyrica is started to see if it may be more efficacious for pain relief. Pt reports he would like to have a motorized wheelchair. Pt brings in a form from [facility]. Pt is advised he will be referred to OT with hospital for testing so that he can be qualified for a power wheelchair. Pt  has no feeling in legs and feet due to severe peripheral neuropathy and has great difficulty with ambulation. Pt is referred for testing and form review and completion. PCP will sign form for patient when it is determined if pt qualifies for a power chair and also type needed. Pt is not using a regular wheelchair but a single point cane from bathroom to bedroom, and reports significant pain. Pt is also legally blind. Pt says can  listen to TV in room with TV. Blindness occurred about 9-12months ago,  pt reports after cataract surgery he could not see. Pt is living with wife, daughter and grand son. Pt says has some help available; from family but much of time they not there as are working.     Pt says last chair that was given to him as motorized chair was poorly

## 2023-08-09 NOTE — THERAPY EVALUATION
Franco Lucas  : 1950  Primary: [address] [facility] Medicare (Medicare Managed)  Secondary:  [address] @ [address] [facility] [address]  Phone: [phone]  Fax: [phone] Plan Frequency: PRN    Plan of Care/Certification Expiration Date: 10/11/23      PT Visit Info:  Plan Frequency: PRN  Plan of Care/Certification Expiration Date: 10/11/23      Visit Count:  1                OUTPATIENT PHYSICAL THERAPY:             OP NOTE TYPE: Initial Assessment 2023               Episode (Wheelchair/Scooter Evaluation) Appt Desk         Treatment Diagnosis:  Difficulty in walking, Not elsewhere classified (R26.2)  Other abnormalities of gait and mobility (R26.89)  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)  Other signs and symptoms involving the musculoskeletal system (R29.898)  Medical/Referring Diagnosis:  Other symptoms and signs involving the musculoskeletal system [R29.898]  Referring Physician:  Joanne Cedillo MD MD Orders:  PT Eval and Treat; Power wheelchair evaluation  Return MD Appt:  23  Date of Onset:  Onset Date: 22 (Foot ampuation)      Allergies:  Alprazolam, Sulfamethoxazole-trimethoprim, Glipizide, and Morphine  Restrictions/Precautions:    Restrictions/Precautions: Fall Risk        Medications Last Reviewed:  2023     SUBJECTIVE   History of Injury/Illness (Reason for Referral):  Patient reports he has had increasing difficulty moving around. He got a wheelchair from [facility] but it was a piece of junk and he sent it back because it wasn't working. 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 He has had increased falls lately and he is concerned he will continue to fall. His foot is not doing well since stopping antibiotics. It has a wound on it and he called his doctors today. He needs a wheelchair so he can get around his home.    Patient Stated Goal(s):  \"Get a 27-Apr-2019 14:47

## 2023-08-09 NOTE — PROGRESS NOTES
posture, and promote proper body mechanics. Progressed complexity of movement as indicated. Date:  8/11/23   Activity/Exercise Parameters   Pressure relief maneuver 10x while seated; education on schedule and skin protection principles   Functional transfers Sit to/from stand x5    Stand pivot x10    Education on safe use of mobility devices, safe transfers   Trial of gait aids/wheelchair Gait trial with walker, single point cane- inadequate support provided to allow for safe mobility in the home    Trial of self propelling manual wheelchair-unable to perform without back and shoulder pain, fatigue at short distances (30ft)                     Time spent with patient reviewing proper muscle recruitment and technique with exercises. Treatment/Session Summary:    >Treatment Assessment:   Patient tolerates assessment and training well. See Evaluation for seating/mobility recommendations. Communication/Consultation:  None today  Equipment provided today:  None  Recommendations/Intent for next treatment session: Next visit will focus on training with mobility device/transfers as needed, modification to recommendations as necessary.     >Total Treatment Billable Duration:  20 minutes evaluation, 39 minutes therapeutic activities  Time In: 2446  Time Out: [address], PT       Charge Capture  }Post Session Pain  PT Visit 25 [facility]  MD Guidelines  Scanned Media  Benefits  MyChart    Future Appointments   Date Time Provider [address] [place] [address]   11/2/2023  1:30 PM Che Orozco MD [facility]

## 2023-08-10 ENCOUNTER — COMMUNITY OUTREACH (OUTPATIENT)
Dept: PRIMARY CARE CLINIC | Facility: CLINIC | Age: 73
End: 2023-08-10

## 2023-08-11 ENCOUNTER — HOSPITAL ENCOUNTER (OUTPATIENT)
Dept: PHYSICAL THERAPY | Age: 73
Setting detail: RECURRING SERIES
Discharge: HOME | End: 2023-08-14
Payer: MEDICARE

## 2023-08-11 PROCEDURE — 97163 PT EVAL HIGH COMPLEX 45 MIN: CPT

## 2023-08-11 PROCEDURE — 97530 THERAPEUTIC ACTIVITIES: CPT

## 2023-08-14 NOTE — PROGRESS NOTES
Group II Power Wheelchair is ordered for pt, as requested by Eryn Matos PT who is doing evaluation. Script is printed and is also in chart record. Delete Safety Seal portion of script.   BW

## 2023-08-22 ENCOUNTER — TELEPHONE (OUTPATIENT)
Dept: PRIMARY CARE CLINIC | Facility: CLINIC | Age: 73
End: 2023-08-22

## 2023-08-22 NOTE — TELEPHONE ENCOUNTER
Message  Received: Today  Sarah Beth Norma GUERRERO l [facility] Clinical Staff  Subject: Message to Provider     QUESTIONS   Information for Provider? Patient is awaiting prescription to be sent for   [facility] wheelchair - Prescription needs to be sent directly to   [facility]. Insurance has already approved. FAX TO? [phone]   ---------------------------------------------------------------------------   --------------   Dominick Corey [facility]   [phone]; OK to leave message on voicemail   ---------------------------------------------------------------------------   --------------   SCRIPT ANSWERS   Relationship to Patient? Spouse/Partner   Representative Name? Maria L Hatfield   Is the representative on the Communication Release of Information (EDUARDO)   form in Epic?  Yes

## 2023-09-20 ENCOUNTER — TELEPHONE (OUTPATIENT)
Dept: PRIMARY CARE CLINIC | Facility: CLINIC | Age: 73
End: 2023-09-20

## 2023-09-20 NOTE — TELEPHONE ENCOUNTER
Called this company back I was on hold for 20 mins, and had other calls coming in and could not wait. wait

## 2023-09-20 NOTE — TELEPHONE ENCOUNTER
Needing fax to [employer] in another format. If any questions call Mark Gillespie at [phone]       If you cant change format you can also send it to her email : [email or web address]

## 2023-11-02 ENCOUNTER — OFFICE VISIT (OUTPATIENT)
Dept: PRIMARY CARE CLINIC | Facility: CLINIC | Age: 73
End: 2023-11-02

## 2023-11-02 VITALS
BODY MASS INDEX: 26.37 KG/M2 | HEART RATE: 67 BPM | SYSTOLIC BLOOD PRESSURE: 138 MMHG | TEMPERATURE: 97.3 F | DIASTOLIC BLOOD PRESSURE: 70 MMHG | OXYGEN SATURATION: 98 % | WEIGHT: 211 LBS

## 2023-11-02 DIAGNOSIS — E78.2 MIXED HYPERLIPIDEMIA: ICD-10-CM

## 2023-11-02 DIAGNOSIS — N18.30 STAGE 3 CHRONIC KIDNEY DISEASE, UNSPECIFIED WHETHER STAGE 3A OR 3B CKD (HCC): ICD-10-CM

## 2023-11-02 DIAGNOSIS — Z89.431 S/P TRANSMETATARSAL AMPUTATION OF FOOT, RIGHT (HCC): ICD-10-CM

## 2023-11-02 DIAGNOSIS — E55.9 VITAMIN D DEFICIENCY: ICD-10-CM

## 2023-11-02 DIAGNOSIS — G60.3 IDIOPATHIC PROGRESSIVE NEUROPATHY: ICD-10-CM

## 2023-11-02 DIAGNOSIS — I25.118 CORONARY ARTERY DISEASE OF NATIVE HEART WITH STABLE ANGINA PECTORIS, UNSPECIFIED VESSEL OR LESION TYPE (HCC): ICD-10-CM

## 2023-11-02 DIAGNOSIS — E11.40 TYPE 2 DIABETES MELLITUS WITH DIABETIC NEUROPATHY, WITHOUT LONG-TERM CURRENT USE OF INSULIN (HCC): ICD-10-CM

## 2023-11-02 DIAGNOSIS — I25.119 ATHEROSCLEROSIS OF NATIVE CORONARY ARTERY OF NATIVE HEART WITH ANGINA PECTORIS (HCC): Primary | ICD-10-CM

## 2023-11-02 DIAGNOSIS — J44.9 CHRONIC OBSTRUCTIVE PULMONARY DISEASE, UNSPECIFIED COPD TYPE (HCC): ICD-10-CM

## 2023-11-02 DIAGNOSIS — I99.8 ISCHEMIA OF LEFT LOWER EXTREMITY: ICD-10-CM

## 2023-11-02 DIAGNOSIS — I10 ESSENTIAL HYPERTENSION WITH GOAL BLOOD PRESSURE LESS THAN 140/90: ICD-10-CM

## 2023-11-02 DIAGNOSIS — J30.9 ALLERGIC RHINITIS, UNSPECIFIED SEASONALITY, UNSPECIFIED TRIGGER: ICD-10-CM

## 2023-11-02 DIAGNOSIS — F51.01 PRIMARY INSOMNIA: ICD-10-CM

## 2023-11-02 RX ORDER — ERGOCALCIFEROL 1.25 MG/1
50000 CAPSULE ORAL WEEKLY
Qty: 12 CAPSULE | Refills: 1 | Status: SHIPPED | OUTPATIENT
Start: 2023-11-02

## 2023-11-02 RX ORDER — LISINOPRIL AND HYDROCHLOROTHIAZIDE 25; 20 MG/1; MG/1
1 TABLET ORAL DAILY
Qty: 90 TABLET | Refills: 3 | Status: SHIPPED | OUTPATIENT
Start: 2023-11-02

## 2023-11-02 RX ORDER — FLUTICASONE PROPIONATE 50 MCG
1 SPRAY, SUSPENSION (ML) NASAL DAILY
Qty: 16 G | Refills: 5 | Status: SHIPPED | OUTPATIENT
Start: 2023-11-02

## 2023-11-02 RX ORDER — TRAZODONE HYDROCHLORIDE 100 MG/1
100 TABLET ORAL NIGHTLY
Qty: 90 TABLET | Refills: 1 | Status: SHIPPED | OUTPATIENT
Start: 2023-11-02

## 2023-11-02 RX ORDER — CLOPIDOGREL BISULFATE 75 MG/1
75 TABLET ORAL DAILY
Qty: 90 TABLET | Refills: 3 | Status: SHIPPED | OUTPATIENT
Start: 2023-11-02

## 2023-11-02 RX ORDER — PREGABALIN 150 MG/1
150 CAPSULE ORAL 3 TIMES DAILY
Qty: 90 CAPSULE | Refills: 2 | Status: SHIPPED | OUTPATIENT
Start: 2023-11-02 | End: 2024-01-31

## 2023-11-02 NOTE — PROGRESS NOTES
Keyanna Ramos MD  [address] Hector Estevez, 310 [facility]  Ph No:  [phone]  Fax:  [phone]:  Chief Complaint   Patient presents with    3 Month Follow-Up     Pt in for 3 month follow up. Pt was having trouble and surgery. Pt has questions about getting the Hoveround           HISTORY OF PRESENT ILLNESS:  Mr. Melanie Richardson is a 71 y.o. male. Pt presents for 3 months follow up. Poor ambulation:  Pt  is advised the Hover round chair has been sent as per report from company. As this is discussed today in clinic and paperwork re faxed since they report they do not have it. 2nd attempt. Pt had surgery on right foot. Pt says wound is healed and has been released from wound care. Pt is treated today for the following medical concerns:    Atherosclerotic heart disease of native coronary artery with unspecified angina pectoris:  plavix is given for blood thinner. Pt is also taking toprol for BP management along with lisinopril-hctz. Pt has NTG available as needed for angina. Primary hypertension:  BP stable 138/70. Pt is treated with lisinopril-hctz, metoprolol daily. Stable continue current therapy. Bilateral peripheral neuropathy:  very severe, pt is taking lyrica 150mg tid dose, with some benefit. Insomnia:  trazodone is given for sleep. Stable continue current therapy. Vit d deficiency - vit d high dose taking once weekly. Diabetes type 2, controlled. Pt is to continue metformin twice daily, checking glucose once daily. BPH with urinary hesitancy:  pt will continue flomax for therapy, stable.     Pt will RTC in 6 months, recheck, cmp, cbc, tsh, free t4, hgba1c, vit d, psa      HISTORY:  Allergies   Allergen Reactions    Alprazolam Shortness Of Breath    Sulfamethoxazole-Trimethoprim Angioedema     Severe reaction, affected kidney function    Glipizide Rash    Morphine Nausea And Vomiting     Past Medical History:   Diagnosis Date

## 2023-12-14 ENCOUNTER — RX ONLY (OUTPATIENT)
Age: 73
Setting detail: RX ONLY
End: 2023-12-14

## 2023-12-14 ENCOUNTER — APPOINTMENT (RX ONLY)
Dept: URBAN - METROPOLITAN AREA CLINIC 329 | Facility: CLINIC | Age: 73
Setting detail: DERMATOLOGY
End: 2023-12-14

## 2023-12-14 VITALS — WEIGHT: 210 LBS | HEIGHT: 75 IN

## 2023-12-14 DIAGNOSIS — Z79.899 OTHER LONG TERM (CURRENT) DRUG THERAPY: ICD-10-CM

## 2023-12-14 DIAGNOSIS — L40.0 PSORIASIS VULGARIS: ICD-10-CM

## 2023-12-14 PROCEDURE — ? ILUMYA INITIATION

## 2023-12-14 PROCEDURE — ? COUNSELING

## 2023-12-14 PROCEDURE — 99214 OFFICE O/P EST MOD 30 MIN: CPT

## 2023-12-14 PROCEDURE — ? HIGH RISK MEDICATION MONITORING

## 2023-12-14 PROCEDURE — ? ADDITIONAL NOTES

## 2023-12-14 PROCEDURE — ? PRESCRIPTION

## 2023-12-14 PROCEDURE — ? ORDER TESTS

## 2023-12-14 PROCEDURE — ? FULL BODY SKIN EXAM - DECLINED

## 2023-12-14 PROCEDURE — ? PRESCRIPTION MEDICATION MANAGEMENT

## 2023-12-14 RX ORDER — TILDRAKIZUMAB-ASMN 100 MG/ML
INJECTION, SOLUTION SUBCUTANEOUS
Qty: 2 | Refills: 0 | Status: ERX | COMMUNITY
Start: 2023-12-14

## 2023-12-14 RX ORDER — TRIAMCINOLONE ACETONIDE 1 MG/G
CREAM TOPICAL
Qty: 454 | Refills: 4 | Status: CANCELLED
Stop reason: CLARIF

## 2023-12-14 RX ORDER — TRIAMCINOLONE ACETONIDE 1 MG/G
CREAM TOPICAL
Qty: 454 | Refills: 4 | Status: ERX

## 2023-12-14 RX ORDER — FLUTICASONE PROPIONATE 0.05 MG/G
OINTMENT TOPICAL
Qty: 1 | Refills: 3 | Status: CANCELLED
Stop reason: CLARIF

## 2023-12-14 RX ORDER — FLUTICASONE PROPIONATE 0.05 MG/G
OINTMENT TOPICAL
Qty: 1 | Refills: 3 | Status: ERX

## 2023-12-14 RX ADMIN — TILDRAKIZUMAB-ASMN: 100 INJECTION, SOLUTION SUBCUTANEOUS at 00:00

## 2023-12-14 ASSESSMENT — LOCATION SIMPLE DESCRIPTION DERM
LOCATION SIMPLE: LEFT FOREARM
LOCATION SIMPLE: LEFT THIGH
LOCATION SIMPLE: RIGHT BUTTOCK
LOCATION SIMPLE: RIGHT THIGH
LOCATION SIMPLE: RIGHT LOWER BACK
LOCATION SIMPLE: LEFT BUTTOCK
LOCATION SIMPLE: RIGHT FOREARM

## 2023-12-14 ASSESSMENT — ITCH NUMERIC RATING SCALE: HOW SEVERE IS YOUR ITCHING?: 8

## 2023-12-14 ASSESSMENT — LOCATION DETAILED DESCRIPTION DERM
LOCATION DETAILED: LEFT ANTERIOR DISTAL THIGH
LOCATION DETAILED: RIGHT ANTERIOR PROXIMAL THIGH
LOCATION DETAILED: RIGHT ANTERIOR DISTAL THIGH
LOCATION DETAILED: RIGHT INFERIOR LATERAL LOWER BACK
LOCATION DETAILED: RIGHT BUTTOCK
LOCATION DETAILED: LEFT PROXIMAL DORSAL FOREARM
LOCATION DETAILED: RIGHT PROXIMAL DORSAL FOREARM
LOCATION DETAILED: LEFT BUTTOCK
LOCATION DETAILED: LEFT ANTERIOR PROXIMAL THIGH

## 2023-12-14 ASSESSMENT — LOCATION ZONE DERM
LOCATION ZONE: ARM
LOCATION ZONE: LEG
LOCATION ZONE: TRUNK

## 2023-12-14 ASSESSMENT — PGA PSORIASIS: PGA PSORIASIS 2020: SEVERE

## 2023-12-14 ASSESSMENT — BSA PSORIASIS: % BODY COVERED IN PSORIASIS: 55

## 2023-12-14 NOTE — PROCEDURE: MIPS QUALITY
Detail Level: Detailed
Quality 110: Preventive Care And Screening: Influenza Immunization: Influenza immunization was not ordered or administered, reason not given
Quality 431: Preventive Care And Screening: Unhealthy Alcohol Use - Screening: Patient not identified as an unhealthy alcohol user when screened for unhealthy alcohol use using a systematic screening method

## 2023-12-14 NOTE — PROCEDURE: ILUMYA INITIATION
Is Cyclosporine Contraindicated?: No
Ilumya Dosing: 100 mg SC week 0 and week 4 then every 12 weeks thereafter
Pregnancy And Lactation Warning Text: The risk during pregnancy and breastfeeding is uncertain with this medication.
Ilumya Monitoring Guidelines: A yearly test for tuberculosis is required while taking Ilumya.
Detail Level: Zone
Diagnosis (Required): Psoriasis

## 2023-12-14 NOTE — PROCEDURE: PRESCRIPTION MEDICATION MANAGEMENT
Initiate Treatment: Ilumya injections 100 mg SC week 0 and week 4 then every 12 weeks thereafter
Continue Regimen: Triamcinolone\\nFluticasone ointment
Render In Strict Bullet Format?: No
Detail Level: Zone

## 2023-12-14 NOTE — PROCEDURE: ORDER TESTS
Bill For Surgical Tray: no
Expected Date Of Service: 12/14/2023
Performing Laboratory: [id number]
Billing Type: Third-Party Bill

## 2023-12-14 NOTE — PROCEDURE: ADDITIONAL NOTES
Additional Notes: Patient has tried and failed Triamcinolone, Fluticasone and Humira
Detail Level: Zone
Render Risk Assessment In Note?: no

## 2023-12-14 NOTE — PROCEDURE: HIGH RISK MEDICATION MONITORING
Dupixent Counseling: I discussed with the patient the risks of dupilumab including but not limited to eye inflammation and irritation, cold sores, injection site reactions, allergic reactions and increased risk of parasitic infection. The patient understands that monitoring is required and they must alert us or the primary physician if symptoms of infection or other concerning signs are noted.
Finasteride Male Counseling: Finasteride Counseling:  I discussed with the patient the risks of use of finasteride including but not limited to decreased libido, decreased ejaculate volume, gynecomastia, and depression. Women should not handle medication.  All of the patient's questions and concerns were addressed.
Terbinafine Pregnancy And Lactation Text: This medication is Pregnancy Category B and is considered safe during pregnancy. It is also excreted in breast milk and breast feeding isn't recommended.
Nsaids Pregnancy And Lactation Text: These medications are considered safe up to 30 weeks gestation. It is excreted in breast milk.
Minoxidil Counseling: Minoxidil is a topical medication which can increase blood flow where it is applied. It is uncertain how this medication increases hair growth. Side effects are uncommon and include stinging and allergic reactions.
Clofazimine Pregnancy And Lactation Text: This medication is Pregnancy Category C and isn't considered safe during pregnancy. It is excreted in breast milk.
Cellcept Pregnancy And Lactation Text: This medication is Pregnancy Category D and isn't considered safe during pregnancy. It is unknown if this medication is excreted in breast milk.
Minocycline Pregnancy And Lactation Text: This medication is Pregnancy Category D and not consider safe during pregnancy. It is also excreted in breast milk.
Winlevi Pregnancy And Lactation Text: This medication is considered safe during pregnancy and breastfeeding.
Clindamycin Counseling: I counseled the patient regarding use of clindamycin as an antibiotic for prophylactic and/or therapeutic purposes. Clindamycin is active against numerous classes of bacteria, including skin bacteria. Side effects may include nausea, diarrhea, gastrointestinal upset, rash, hives, yeast infections, and in rare cases, colitis.
Xelmyraz Pregnancy And Lactation Text: This medication is Pregnancy Category D and is not considered safe during pregnancy.  The risk during breast feeding is also uncertain.
Fluconazole Counseling:  Patient counseled regarding adverse effects of fluconazole including but not limited to headache, diarrhea, nausea, upset stomach, liver function test abnormalities, taste disturbance, and stomach pain.  There is a rare possibility of liver failure that can occur when taking fluconazole.  The patient understands that monitoring of LFTs and kidney function test may be required, especially at baseline. The patient verbalized understanding of the proper use and possible adverse effects of fluconazole.  All of the patient's questions and concerns were addressed.
Topical Clindamycin Counseling: Patient counseled that this medication may cause skin irritation or allergic reactions.  In the event of skin irritation, the patient was advised to reduce the amount of the drug applied or use it less frequently.   The patient verbalized understanding of the proper use and possible adverse effects of clindamycin.  All of the patient's questions and concerns were addressed.
Ivermectin Counseling:  Patient instructed to take medication on an empty stomach with a full glass of water.  Patient informed of potential adverse effects including but not limited to nausea, diarrhea, dizziness, itching, and swelling of the extremities or lymph nodes.  The patient verbalized understanding of the proper use and possible adverse effects of ivermectin.  All of the patient's questions and concerns were addressed.
Cibinqo Counseling: I discussed with the patient the risks of Cibinqo therapy including but not limited to common cold, nausea, headache, cold sores, increased blood CPK levels, dizziness, UTIs, fatigue, acne, and vomitting. Live vaccines should be avoided.  This medication has been linked to serious infections; higher rate of mortality; malignancy and lymphoproliferative disorders; major adverse cardiovascular events; thrombosis; thrombocytopenia and lymphopenia; lipid elevations; and retinal detachment.
Drysol Pregnancy And Lactation Text: This medication is considered safe during pregnancy and breast feeding.
Propranolol Pregnancy And Lactation Text: This medication is Pregnancy Category C and it isn't known if it is safe during pregnancy. It is excreted in breast milk.
Rituxan Counseling:  I discussed with the patient the risks of Rituxan infusions. Side effects can include infusion reactions, severe drug rashes including mucocutaneous reactions, reactivation of latent hepatitis and other infections and rarely progressive multifocal leukoencephalopathy.  All of the patient's questions and concerns were addressed.
Skyrizi Pregnancy And Lactation Text: The risk during pregnancy and breastfeeding is uncertain with this medication.
Include Pregnancy/Lactation Warning?: No
Protopic Pregnancy And Lactation Text: This medication is Pregnancy Category C. It is unknown if this medication is excreted in breast milk when applied topically.
Colchicine Counseling:  Patient counseled regarding adverse effects including but not limited to stomach upset (nausea, vomiting, stomach pain, or diarrhea).  Patient instructed to limit alcohol consumption while taking this medication.  Colchicine may reduce blood counts especially with prolonged use.  The patient understands that monitoring of kidney function and blood counts may be required, especially at baseline. The patient verbalized understanding of the proper use and possible adverse effects of colchicine.  All of the patient's questions and concerns were addressed.
Erivedge Counseling- I discussed with the patient the risks of Erivedge including but not limited to nausea, vomiting, diarrhea, constipation, weight loss, changes in the sense of taste, decreased appetite, muscle spasms, and hair loss.  The patient verbalized understanding of the proper use and possible adverse effects of Erivedge.  All of the patient's questions and concerns were addressed.
Dupixent Pregnancy And Lactation Text: This medication likely crosses the placenta but the risk for the fetus is uncertain. This medication is excreted in breast milk.
Minoxidil Pregnancy And Lactation Text: This medication has not been assigned a Pregnancy Risk Category but animal studies failed to show danger with the topical medication. It is unknown if the medication is excreted in breast milk.
Hydroxychloroquine Pregnancy And Lactation Text: This medication has been shown to cause fetal harm but it isn't assigned a Pregnancy Risk Category. There are small amounts excreted in breast milk.
Olanzapine Counseling- I discussed with the patient the common side effects of olanzapine including but are not limited to: lack of energy, dry mouth, increased appetite, sleepiness, tremor, constipation, dizziness, changes in behavior, or restlessness.  Explained that teenagers are more likely to experience headaches, abdominal pain, pain in the arms or legs, tiredness, and sleepiness.  Serious side effects include but are not limited: increased risk of death in elderly patients who are confused, have memory loss, or dementia-related psychosis; hyperglycemia; increased cholesterol and triglycerides; and weight gain.
Finasteride Pregnancy And Lactation Text: This medication is absolutely contraindicated during pregnancy. It is unknown if it is excreted in breast milk.
Fluconazole Pregnancy And Lactation Text: This medication is Pregnancy Category C and it isn't know if it is safe during pregnancy. It is also excreted in breast milk.
Azelaic Acid Counseling: Patient counseled that medicine may cause skin irritation and to avoid applying near the eyes.  In the event of skin irritation, the patient was advised to reduce the amount of the drug applied or use it less frequently.   The patient verbalized understanding of the proper use and possible adverse effects of azelaic acid.  All of the patient's questions and concerns were addressed.
Quinolones Counseling:  I discussed with the patient the risks of fluoroquinolones including but not limited to GI upset, allergic reaction, drug rash, diarrhea, dizziness, photosensitivity, yeast infections, liver function test abnormalities, tendonitis/tendon rupture.
Clindamycin Pregnancy And Lactation Text: This medication can be used in pregnancy if certain situations. Clindamycin is also present in breast milk.
Elidel Counseling: Patient may experience a mild burning sensation during topical application. Elidel is not approved in children less than 2 years of age. There have been case reports of hematologic and skin malignancies in patients using topical calcineurin inhibitors although causality is questionable.
Topical Clindamycin Pregnancy And Lactation Text: This medication is Pregnancy Category B and is considered safe during pregnancy. It is unknown if it is excreted in breast milk.
Cimetidine Counseling:  I discussed with the patient the risks of Cimetidine including but not limited to gynecomastia, headache, diarrhea, nausea, drowsiness, arrhythmias, pancreatitis, skin rashes, psychosis, bone marrow suppression and kidney toxicity.
Cyclophosphamide Counseling:  I discussed with the patient the risks of cyclophosphamide including but not limited to hair loss, hormonal abnormalities, decreased fertility, abdominal pain, diarrhea, nausea and vomiting, bone marrow suppression and infection. The patient understands that monitoring is required while taking this medication.
Zyclara Counseling:  I discussed with the patient the risks of imiquimod including but not limited to erythema, scaling, itching, weeping, crusting, and pain.  Patient understands that the inflammatory response to imiquimod is variable from person to person and was educated regarded proper titration schedule.  If flu-like symptoms develop, patient knows to discontinue the medication and contact us.
SSKI Counseling:  I discussed with the patient the risks of SSKI including but not limited to thyroid abnormalities, metallic taste, GI upset, fever, headache, acne, arthralgias, paraesthesias, lymphadenopathy, easy bleeding, arrhythmias, and allergic reaction.
Rituxan Pregnancy And Lactation Text: This medication is Pregnancy Category C and it isn't know if it is safe during pregnancy. It is unknown if this medication is excreted in breast milk but similar antibodies are known to be excreted.
Olanzapine Pregnancy And Lactation Text: This medication is pregnancy category C.   There are no adequate and well controlled trials with olanzapine in pregnant females.  Olanzapine should be used during pregnancy only if the potential benefit justifies the potential risk to the fetus.   In a study in lactating healthy women, olanzapine was excreted in breast milk.  It is recommended that women taking olanzapine should not breast feed.
Stelara Counseling:  I discussed with the patient the risks of ustekinumab including but not limited to immunosuppression, malignancy, posterior leukoencephalopathy syndrome, and serious infections.  The patient understands that monitoring is required including a PPD at baseline and must alert us or the primary physician if symptoms of infection or other concerning signs are noted.
Ivermectin Pregnancy And Lactation Text: This medication is Pregnancy Category C and it isn't known if it is safe during pregnancy. It is also excreted in breast milk.
Acitretin Counseling:  I discussed with the patient the risks of acitretin including but not limited to hair loss, dry lips/skin/eyes, liver damage, hyperlipidemia, depression/suicidal ideation, photosensitivity.  Serious rare side effects can include but are not limited to pancreatitis, pseudotumor cerebri, bony changes, clot formation/stroke/heart attack.  Patient understands that alcohol is contraindicated since it can result in liver toxicity and significantly prolong the elimination of the drug by many years.
Cibinqo Pregnancy And Lactation Text: It is unknown if this medication will adversely affect pregnancy or breast feeding.  You should not take this medication if you are currently pregnant or planning a pregnancy or while breastfeeding.
Low Dose Naltrexone Counseling- I discussed with the patient the potential risks and side effects of low dose naltrexone including but not limited to: more vivid dreams, headaches, nausea, vomiting, abdominal pain, fatigue, dizziness, and anxiety.
Mirvaso Counseling: Mirvaso is a topical medication which can decrease superficial blood flow where applied. Side effects are uncommon and include stinging, redness and allergic reactions.
Rhofade Counseling: Rhofade is a topical medication which can decrease superficial blood flow where applied. Side effects are uncommon and include stinging, redness and allergic reactions.
Enbrel Counseling:  I discussed with the patient the risks of etanercept including but not limited to myelosuppression, immunosuppression, autoimmune hepatitis, demyelinating diseases, lymphoma, and infections.  The patient understands that monitoring is required including a PPD at baseline and must alert us or the primary physician if symptoms of infection or other concerning signs are noted.
Erivedge Pregnancy And Lactation Text: This medication is Pregnancy Category X and is absolutely contraindicated during pregnancy. It is unknown if it is excreted in breast milk.
Zyclara Pregnancy And Lactation Text: This medication is Pregnancy Category C. It is unknown if this medication is excreted in breast milk.
Sski Pregnancy And Lactation Text: This medication is Pregnancy Category D and isn't considered safe during pregnancy. It is excreted in breast milk.
Griseofulvin Counseling:  I discussed with the patient the risks of griseofulvin including but not limited to photosensitivity, cytopenia, liver damage, nausea/vomiting and severe allergy.  The patient understands that this medication is best absorbed when taken with a fatty meal (e.g., ice cream or french fries).
Birth Control Pills Counseling: Birth Control Pill Counseling: I discussed with the patient the potential side effects of OCPs including but not limited to increased risk of stroke, heart attack, thrombophlebitis, deep venous thrombosis, hepatic adenomas, breast changes, GI upset, headaches, and depression.  The patient verbalized understanding of the proper use and possible adverse effects of OCPs. All of the patient's questions and concerns were addressed.
Cyclophosphamide Pregnancy And Lactation Text: This medication is Pregnancy Category D and it isn't considered safe during pregnancy. This medication is excreted in breast milk.
Doxycycline Counseling:  Patient counseled regarding possible photosensitivity and increased risk for sunburn.  Patient instructed to avoid sunlight, if possible.  When exposed to sunlight, patients should wear protective clothing, sunglasses, and sunscreen.  The patient was instructed to call the office immediately if the following severe adverse effects occur:  hearing changes, easy bruising/bleeding, severe headache, or vision changes.  The patient verbalized understanding of the proper use and possible adverse effects of doxycycline.  All of the patient's questions and concerns were addressed.
Low Dose Naltrexone Pregnancy And Lactation Text: Naltrexone is pregnancy category C.  There have been no adequate and well-controlled studies in pregnant women.  It should be used in pregnancy only if the potential benefit justifies the potential risk to the fetus.   Limited data indicates that naltrexone is minimally excreted into breastmilk.
Siliq Counseling:  I discussed with the patient the risks of Siliq including but not limited to new or worsening depression, suicidal thoughts and behavior, immunosuppression, malignancy, posterior leukoencephalopathy syndrome, and serious infections.  The patient understands that monitoring is required including a PPD at baseline and must alert us or the primary physician if symptoms of infection or other concerning signs are noted. There is also a special program designed to monitor depression which is required with Siliq.
Topical Ketoconazole Counseling: Patient counseled that this medication may cause skin irritation or allergic reactions.  In the event of skin irritation, the patient was advised to reduce the amount of the drug applied or use it less frequently.   The patient verbalized understanding of the proper use and possible adverse effects of ketoconazole.  All of the patient's questions and concerns were addressed.
Olumiant Counseling: I discussed with the patient the risks of Olumiant therapy including but not limited to upper respiratory tract infections, shingles, cold sores, and nausea. Live vaccines should be avoided.  This medication has been linked to serious infections; higher rate of mortality; malignancy and lymphoproliferative disorders; major adverse cardiovascular events; thrombosis; gastrointestinal perforations; neutropenia; lymphopenia; anemia; liver enzyme elevations; and lipid elevations.
Rhofade Pregnancy And Lactation Text: This medication has not been assigned a Pregnancy Risk Category. It is unknown if the medication is excreted in breast milk.
Enbrel Pregnancy And Lactation Text: This medication is Pregnancy Category B and is considered safe during pregnancy. It is unknown if this medication is excreted in breast milk.
Benzoyl Peroxide Counseling: Patient counseled that medicine may cause skin irritation and bleach clothing.  In the event of skin irritation, the patient was advised to reduce the amount of the drug applied or use it less frequently.   The patient verbalized understanding of the proper use and possible adverse effects of benzoyl peroxide.  All of the patient's questions and concerns were addressed.
Acitretin Pregnancy And Lactation Text: This medication is Pregnancy Category X and should not be given to women who are pregnant or may become pregnant in the future. This medication is excreted in breast milk.
Oral Minoxidil Counseling- I discussed with the patient the risks of oral minoxidil including but not limited to shortness of breath, swelling of the feet or ankles, dizziness, lightheadedness, unwanted hair growth and allergic reaction.  The patient verbalized understanding of the proper use and possible adverse effects of oral minoxidil.  All of the patient's questions and concerns were addressed.
Libtayo Counseling- I discussed with the patient the risks of Libtayo including but not limited to nausea, vomiting, diarrhea, and bone or muscle pain.  The patient verbalized understanding of the proper use and possible adverse effects of Libtayo.  All of the patient's questions and concerns were addressed.
Dapsone Counseling: I discussed with the patient the risks of dapsone including but not limited to hemolytic anemia, agranulocytosis, rashes, methemoglobinemia, kidney failure, peripheral neuropathy, headaches, GI upset, and liver toxicity.  Patients who start dapsone require monitoring including baseline LFTs and weekly CBCs for the first month, then every month thereafter.  The patient verbalized understanding of the proper use and possible adverse effects of dapsone.  All of the patient's questions and concerns were addressed.
Rifampin Counseling: I discussed with the patient the risks of rifampin including but not limited to liver damage, kidney damage, red-orange body fluids, nausea/vomiting and severe allergy.
Adbry Counseling: I discussed with the patient the risks of tralokinumab including but not limited to eye infection and irritation, cold sores, injection site reactions, worsening of asthma, allergic reactions and increased risk of parasitic infection.  Live vaccines should be avoided while taking tralokinumab. The patient understands that monitoring is required and they must alert us or the primary physician if symptoms of infection or other concerning signs are noted.
Eucrisa Counseling: Patient may experience a mild burning sensation during topical application. Eucrisa is not approved in children less than 3 months of age.
Birth Control Pills Pregnancy And Lactation Text: This medication should be avoided if pregnant and for the first 30 days post-partum.
Doxepin Counseling:  Patient advised that the medication is sedating and not to drive a car after taking this medication. Patient informed of potential adverse effects including but not limited to dry mouth, urinary retention, and blurry vision.  The patient verbalized understanding of the proper use and possible adverse effects of doxepin.  All of the patient's questions and concerns were addressed.
Olumiant Pregnancy And Lactation Text: Based on animal studies, Olumiant may cause embryo-fetal harm when administered to pregnant women.  The medication should not be used in pregnancy.  Breastfeeding is not recommended during treatment.
Thalidomide Counseling: I discussed with the patient the risks of thalidomide including but not limited to birth defects, anxiety, weakness, chest pain, dizziness, cough and severe allergy.
Griseofulvin Pregnancy And Lactation Text: This medication is Pregnancy Category X and is known to cause serious birth defects. It is unknown if this medication is excreted in breast milk but breast feeding should be avoided.
Doxycycline Pregnancy And Lactation Text: This medication is Pregnancy Category D and not consider safe during pregnancy. It is also excreted in breast milk but is considered safe for shorter treatment courses.
Cyclosporine Counseling:  I discussed with the patient the risks of cyclosporine including but not limited to hypertension, gingival hyperplasia,myelosuppression, immunosuppression, liver damage, kidney damage, neurotoxicity, lymphoma, and serious infections. The patient understands that monitoring is required including baseline blood pressure, CBC, CMP, lipid panel and uric acid, and then 1-2 times monthly CMP and blood pressure.
Carac Pregnancy And Lactation Text: This medication is Pregnancy Category X and contraindicated in pregnancy and in women who may become pregnant. It is unknown if this medication is excreted in breast milk.
Benzoyl Peroxide Pregnancy And Lactation Text: This medication is Pregnancy Category C. It is unknown if benzoyl peroxide is excreted in breast milk.
Humira Counseling:  I discussed with the patient the risks of adalimumab including but not limited to myelosuppression, immunosuppression, autoimmune hepatitis, demyelinating diseases, lymphoma, and serious infections.  The patient understands that monitoring is required including a PPD at baseline and must alert us or the primary physician if symptoms of infection or other concerning signs are noted.
Solaraze Counseling:  I discussed with the patient the risks of Solaraze including but not limited to erythema, scaling, itching, weeping, crusting, and pain.
Bexarotene Counseling:  I discussed with the patient the risks of bexarotene including but not limited to hair loss, dry lips/skin/eyes, liver abnormalities, hyperlipidemia, pancreatitis, depression/suicidal ideation, photosensitivity, drug rash/allergic reactions, hypothyroidism, anemia, leukopenia, infection, cataracts, and teratogenicity.  Patient understands that they will need regular blood tests to check lipid profile, liver function tests, white blood cell count, thyroid function tests and pregnancy test if applicable.
Taltz Counseling: I discussed with the patient the risks of ixekizumab including but not limited to immunosuppression, serious infections, worsening of inflammatory bowel disease and drug reactions.  The patient understands that monitoring is required including a PPD at baseline and must alert us or the primary physician if symptoms of infection or other concerning signs are noted.
Opioid Counseling: I discussed with the patient the potential side effects of opioids including but not limited to addiction, altered mental status, and depression. I stressed avoiding alcohol, benzodiazepines, muscle relaxants and sleep aids unless specifically okayed by a physician. The patient verbalized understanding of the proper use and possible adverse effects of opioids. All of the patient's questions and concerns were addressed. They were instructed to flush the remaining pills down the toilet if they did not need them for pain.
Oral Minoxidil Pregnancy And Lactation Text: This medication should only be used when clearly needed if you are pregnant, attempting to become pregnant or breast feeding.
Spironolactone Counseling: Patient advised regarding risks of diarrhea, abdominal pain, hyperkalemia, birth defects (for female patients), liver toxicity and renal toxicity. The patient may need blood work to monitor liver and kidney function and potassium levels while on therapy. The patient verbalized understanding of the proper use and possible adverse effects of spironolactone.  All of the patient's questions and concerns were addressed.
Dapsone Pregnancy And Lactation Text: This medication is Pregnancy Category C and is not considered safe during pregnancy or breast feeding.
Libtayo Pregnancy And Lactation Text: This medication is contraindicated in pregnancy and when breast feeding.
Rifampin Pregnancy And Lactation Text: This medication is Pregnancy Category C and it isn't know if it is safe during pregnancy. It is also excreted in breast milk and should not be used if you are breast feeding.
Doxepin Pregnancy And Lactation Text: This medication is Pregnancy Category C and it isn't known if it is safe during pregnancy. It is also excreted in breast milk and breast feeding isn't recommended.
Cyclosporine Pregnancy And Lactation Text: This medication is Pregnancy Category C and it isn't know if it is safe during pregnancy. This medication is excreted in breast milk.
Erythromycin Counseling:  I discussed with the patient the risks of erythromycin including but not limited to GI upset, allergic reaction, drug rash, diarrhea, increase in liver enzymes, and yeast infections.
Adbry Pregnancy And Lactation Text: It is unknown if this medication will adversely affect pregnancy or breast feeding.
Itraconazole Counseling:  I discussed with the patient the risks of itraconazole including but not limited to liver damage, nausea/vomiting, neuropathy, and severe allergy.  The patient understands that this medication is best absorbed when taken with acidic beverages such as non-diet cola or ginger ale.  The patient understands that monitoring is required including baseline LFTs and repeat LFTs at intervals.  The patient understands that they are to contact us or the primary physician if concerning signs are noted.
Topical Sulfur Applications Counseling: Topical Sulfur Counseling: Patient counseled that this medication may cause skin irritation or allergic reactions.  In the event of skin irritation, the patient was advised to reduce the amount of the drug applied or use it less frequently.   The patient verbalized understanding of the proper use and possible adverse effects of topical sulfur application.  All of the patient's questions and concerns were addressed.
Otezla Counseling: The side effects of Otezla were discussed with the patient, including but not limited to worsening or new depression, weight loss, diarrhea, nausea, upper respiratory tract infection, and headache. Patient instructed to call the office should any adverse effect occur.  The patient verbalized understanding of the proper use and possible adverse effects of Otezla.  All the patient's questions and concerns were addressed.
Opioid Pregnancy And Lactation Text: These medications can lead to premature delivery and should be avoided during pregnancy. These medications are also present in breast milk in small amounts.
Rinvoq Counseling: I discussed with the patient the risks of Rinvoq therapy including but not limited to upper respiratory tract infections, shingles, cold sores, bronchitis, nausea, cough, fever, acne, and headache. Live vaccines should be avoided.  This medication has been linked to serious infections; higher rate of mortality; malignancy and lymphoproliferative disorders; major adverse cardiovascular events; thrombosis; thrombocytopenia, anemia, and neutropenia; lipid elevations; liver enzyme elevations; and gastrointestinal perforations.
Calcipotriene Counseling:  I discussed with the patient the risks of calcipotriene including but not limited to erythema, scaling, itching, and irritation.
Gabapentin Counseling: I discussed with the patient the risks of gabapentin including but not limited to dizziness, somnolence, fatigue and ataxia.
Odomzo Counseling- I discussed with the patient the risks of Odomzo including but not limited to nausea, vomiting, diarrhea, constipation, weight loss, changes in the sense of taste, decreased appetite, muscle spasms, and hair loss.  The patient verbalized understanding of the proper use and possible adverse effects of Odomzo.  All of the patient's questions and concerns were addressed.
Carac Counseling:  I discussed with the patient the risks of Carac including but not limited to erythema, scaling, itching, weeping, crusting, and pain.
Sarecycline Counseling: Patient advised regarding possible photosensitivity and discoloration of the teeth, skin, lips, tongue and gums.  Patient instructed to avoid sunlight, if possible.  When exposed to sunlight, patients should wear protective clothing, sunglasses, and sunscreen.  The patient was instructed to call the office immediately if the following severe adverse effects occur:  hearing changes, easy bruising/bleeding, severe headache, or vision changes.  The patient verbalized understanding of the proper use and possible adverse effects of sarecycline.  All of the patient's questions and concerns were addressed.
Solaraze Pregnancy And Lactation Text: This medication is Pregnancy Category B and is considered safe. There is some data to suggest avoiding during the third trimester. It is unknown if this medication is excreted in breast milk.
Bexarotene Pregnancy And Lactation Text: This medication is Pregnancy Category X and should not be given to women who are pregnant or may become pregnant. This medication should not be used if you are breast feeding.
Cimzia Counseling:  I discussed with the patient the risks of Cimzia including but not limited to immunosuppression, allergic reactions and infections.  The patient understands that monitoring is required including a PPD at baseline and must alert us or the primary physician if symptoms of infection or other concerning signs are noted.
Spironolactone Pregnancy And Lactation Text: This medication can cause feminization of the male fetus and should be avoided during pregnancy. The active metabolite is also found in breast milk.
Hydroquinone Counseling:  Patient advised that medication may result in skin irritation, lightening (hypopigmentation), dryness, and burning.  In the event of skin irritation, the patient was advised to reduce the amount of the drug applied or use it less frequently.  Rarely, spots that are treated with hydroquinone can become darker (pseudoochronosis).  Should this occur, patient instructed to stop medication and call the office. The patient verbalized understanding of the proper use and possible adverse effects of hydroquinone.  All of the patient's questions and concerns were addressed.
Opzelura Counseling:  I discussed with the patient the risks of Opzelura including but not limited to nasopharngitis, bronchitis, ear infection, eosinophila, hives, diarrhea, folliculitis, tonsillitis, and rhinorrhea.  Taken orally, this medication has been linked to serious infections; higher rate of mortality; malignancy and lymphoproliferative disorders; major adverse cardiovascular events; thrombosis; thrombocytopenia, anemia, and neutropenia; and lipid elevations.
Erythromycin Pregnancy And Lactation Text: This medication is Pregnancy Category B and is considered safe during pregnancy. It is also excreted in breast milk.
Topical Sulfur Applications Pregnancy And Lactation Text: This medication is considered safe during pregnancy and breast feeding secondary to limited systemic absorption.
Hydroxyzine Counseling: Patient advised that the medication is sedating and not to drive a car after taking this medication.  Patient informed of potential adverse effects including but not limited to dry mouth, urinary retention, and blurry vision.  The patient verbalized understanding of the proper use and possible adverse effects of hydroxyzine.  All of the patient's questions and concerns were addressed.
Methotrexate Counseling:  Patient counseled regarding adverse effects of methotrexate including but not limited to nausea, vomiting, abnormalities in liver function tests. Patients may develop mouth sores, rash, diarrhea, and abnormalities in blood counts. The patient understands that monitoring is required including LFT's and blood counts.  There is a rare possibility of scarring of the liver and lung problems that can occur when taking methotrexate. Persistent nausea, loss of appetite, pale stools, dark urine, cough, and shortness of breath should be reported immediately. Patient advised to discontinue methotrexate treatment at least three months before attempting to become pregnant.  I discussed the need for folate supplements while taking methotrexate.  These supplements can decrease side effects during methotrexate treatment. The patient verbalized understanding of the proper use and possible adverse effects of methotrexate.  All of the patient's questions and concerns were addressed.
Rinvoq Pregnancy And Lactation Text: Based on animal studies, Rinvoq may cause embryo-fetal harm when administered to pregnant women.  The medication should not be used in pregnancy.  Breastfeeding is not recommended during treatment and for 6 days after the last dose.
Tranexamic Acid Counseling:  Patient advised of the small risk of bleeding problems with tranexamic acid. They were also instructed to call if they developed any nausea, vomiting or diarrhea. All of the patient's questions and concerns were addressed.
Otezla Pregnancy And Lactation Text: This medication is Pregnancy Category C and it isn't known if it is safe during pregnancy. It is unknown if it is excreted in breast milk.
Calcipotriene Pregnancy And Lactation Text: This medication has not been proven safe during pregnancy. It is unknown if this medication is excreted in breast milk.
Isotretinoin Counseling: Patient should get monthly blood tests, not donate blood, not drive at night if vision affected, not share medication, and not undergo elective surgery for 6 months after tx completed. Side effects reviewed, pt to contact office should one occur.
Tremfya Counseling: I discussed with the patient the risks of guselkumab including but not limited to immunosuppression, serious infections, and drug reactions.  The patient understands that monitoring is required including a PPD at baseline and must alert us or the primary physician if symptoms of infection or other concerning signs are noted.
Bactrim Counseling:  I discussed with the patient the risks of sulfa antibiotics including but not limited to GI upset, allergic reaction, drug rash, diarrhea, dizziness, photosensitivity, and yeast infections.  Rarely, more serious reactions can occur including but not limited to aplastic anemia, agranulocytosis, methemoglobinemia, blood dyscrasias, liver or kidney failure, lung infiltrates or desquamative/blistering drug rashes.
Topical Retinoid counseling:  Patient advised to apply a pea-sized amount only at bedtime and wait 30 minutes after washing their face before applying.  If too drying, patient may add a non-comedogenic moisturizer. The patient verbalized understanding of the proper use and possible adverse effects of retinoids.  All of the patient's questions and concerns were addressed.
Cimzia Pregnancy And Lactation Text: This medication crosses the placenta but can be considered safe in certain situations. Cimzia may be excreted in breast milk.
Arava Counseling:  Patient counseled regarding adverse effects of Arava including but not limited to nausea, vomiting, abnormalities in liver function tests. Patients may develop mouth sores, rash, diarrhea, and abnormalities in blood counts. The patient understands that monitoring is required including LFTs and blood counts.  There is a rare possibility of scarring of the liver and lung problems that can occur when taking methotrexate. Persistent nausea, loss of appetite, pale stools, dark urine, cough, and shortness of breath should be reported immediately. Patient advised to discontinue Arava treatment and consult with a physician prior to attempting conception. The patient will have to undergo a treatment to eliminate Arava from the body prior to conception.
Azithromycin Counseling:  I discussed with the patient the risks of azithromycin including but not limited to GI upset, allergic reaction, drug rash, diarrhea, and yeast infections.
Opzelura Pregnancy And Lactation Text: There is insufficient data to evaluate drug-associated risk for major birth defects, miscarriage, or other adverse maternal or fetal outcomes.  There is a pregnancy registry that monitors pregnancy outcomes in pregnant persons exposed to the medication during pregnancy.  It is unknown if this medication is excreted in breast milk.  Do not breastfeed during treatment and for about 4 weeks after the last dose.
Ilumya Counseling: I discussed with the patient the risks of tildrakizumab including but not limited to immunosuppression, malignancy, posterior leukoencephalopathy syndrome, and serious infections.  The patient understands that monitoring is required including a PPD at baseline and must alert us or the primary physician if symptoms of infection or other concerning signs are noted.
Niacinamide Counseling: I recommended taking niacin or niacinamide, also know as vitamin B3, twice daily. Recent evidence suggests that taking vitamin B3 (500 mg twice daily) can reduce the risk of actinic keratoses and non-melanoma skin cancers. Side effects of vitamin B3 include flushing and headache.
Tranexamic Acid Pregnancy And Lactation Text: It is unknown if this medication is safe during pregnancy or breast feeding.
Ketoconazole Counseling:   Patient counseled regarding improving absorption with orange juice.  Adverse effects include but are not limited to breast enlargement, headache, diarrhea, nausea, upset stomach, liver function test abnormalities, taste disturbance, and stomach pain.  There is a rare possibility of liver failure that can occur when taking ketoconazole. The patient understands that monitoring of LFTs may be required, especially at baseline. The patient verbalized understanding of the proper use and possible adverse effects of ketoconazole.  All of the patient's questions and concerns were addressed.
Methotrexate Pregnancy And Lactation Text: This medication is Pregnancy Category X and is known to cause fetal harm. This medication is excreted in breast milk.
Metronidazole Counseling:  I discussed with the patient the risks of metronidazole including but not limited to seizures, nausea/vomiting, a metallic taste in the mouth, nausea/vomiting and severe allergy.
5-Fu Counseling: 5-Fluorouracil Counseling:  I discussed with the patient the risks of 5-fluorouracil including but not limited to erythema, scaling, itching, weeping, crusting, and pain.
Isotretinoin Pregnancy And Lactation Text: This medication is Pregnancy Category X and is considered extremely dangerous during pregnancy. It is unknown if it is excreted in breast milk.
Azathioprine Counseling:  I discussed with the patient the risks of azathioprine including but not limited to myelosuppression, immunosuppression, hepatotoxicity, lymphoma, and infections.  The patient understands that monitoring is required including baseline LFTs, Creatinine, possible TPMP genotyping and weekly CBCs for the first month and then every 2 weeks thereafter.  The patient verbalized understanding of the proper use and possible adverse effects of azathioprine.  All of the patient's questions and concerns were addressed.
Wartpeel Counseling:  I discussed with the patient the risks of Wartpeel including but not limited to erythema, scaling, itching, weeping, crusting, and pain.
Sotyktu Counseling:  I discussed the most common side effects of Sotyktu including: common cold, sore throat, sinus infections, cold sores, canker sores, folliculitis, and acne.  I also discussed more serious side effects of Sotyktu including but not limited to: serious allergic reactions; increased risk for infections such as TB; cancers such as lymphomas; rhabdomyolysis and elevated CPK; and elevated triglycerides and liver enzymes. 
Hydroxyzine Pregnancy And Lactation Text: This medication is not safe during pregnancy and should not be taken. It is also excreted in breast milk and breast feeding isn't recommended.
Niacinamide Pregnancy And Lactation Text: These medications are considered safe during pregnancy.
Azithromycin Pregnancy And Lactation Text: This medication is considered safe during pregnancy and is also secreted in breast milk.
Simponi Counseling:  I discussed with the patient the risks of golimumab including but not limited to myelosuppression, immunosuppression, autoimmune hepatitis, demyelinating diseases, lymphoma, and serious infections.  The patient understands that monitoring is required including a PPD at baseline and must alert us or the primary physician if symptoms of infection or other concerning signs are noted.
Bactrim Pregnancy And Lactation Text: This medication is Pregnancy Category D and is known to cause fetal risk.  It is also excreted in breast milk.
Oxybutynin Counseling:  I discussed with the patient the risks of oxybutynin including but not limited to skin rash, drowsiness, dry mouth, difficulty urinating, and blurred vision.
Glycopyrrolate Counseling:  I discussed with the patient the risks of glycopyrrolate including but not limited to skin rash, drowsiness, dry mouth, difficulty urinating, and blurred vision.
Ketoconazole Pregnancy And Lactation Text: This medication is Pregnancy Category C and it isn't know if it is safe during pregnancy. It is also excreted in breast milk and breast feeding isn't recommended.
Prednisone Counseling:  I discussed with the patient the risks of prolonged use of prednisone including but not limited to weight gain, insomnia, osteoporosis, mood changes, diabetes, susceptibility to infection, glaucoma and high blood pressure.  In cases where prednisone use is prolonged, patients should be monitored with blood pressure checks, serum glucose levels and an eye exam.  Additionally, the patient may need to be placed on GI prophylaxis, PCP prophylaxis, and calcium and vitamin D supplementation and/or a bisphosphonate.  The patient verbalized understanding of the proper use and the possible adverse effects of prednisone.  All of the patient's questions and concerns were addressed.
Tetracycline Counseling: Patient counseled regarding possible photosensitivity and increased risk for sunburn.  Patient instructed to avoid sunlight, if possible.  When exposed to sunlight, patients should wear protective clothing, sunglasses, and sunscreen.  The patient was instructed to call the office immediately if the following severe adverse effects occur:  hearing changes, easy bruising/bleeding, severe headache, or vision changes.  The patient verbalized understanding of the proper use and possible adverse effects of tetracycline.  All of the patient's questions and concerns were addressed. Patient understands to avoid pregnancy while on therapy due to potential birth defects.
Picato Counseling:  I discussed with the patient the risks of Picato including but not limited to erythema, scaling, itching, weeping, crusting, and pain.
Valtrex Counseling: I discussed with the patient the risks of valacyclovir including but not limited to kidney damage, nausea, vomiting and severe allergy.  The patient understands that if the infection seems to be worsening or is not improving, they are to call.
Cosentyx Counseling:  I discussed with the patient the risks of Cosentyx including but not limited to worsening of Crohn's disease, immunosuppression, allergic reactions and infections.  The patient understands that monitoring is required including a PPD at baseline and must alert us or the primary physician if symptoms of infection or other concerning signs are noted.
Metronidazole Pregnancy And Lactation Text: This medication is Pregnancy Category B and considered safe during pregnancy.  It is also excreted in breast milk.
High Dose Vitamin A Counseling: Side effects reviewed, pt to contact office should one occur.
Imiquimod Counseling:  I discussed with the patient the risks of imiquimod including but not limited to erythema, scaling, itching, weeping, crusting, and pain.  Patient understands that the inflammatory response to imiquimod is variable from person to person and was educated regarded proper titration schedule.  If flu-like symptoms develop, patient knows to discontinue the medication and contact us.
Sotyktu Pregnancy And Lactation Text: There is insufficient data to evaluate whether or not Sotyktu is safe to use during pregnancy.   It is not known if Sotyktu passes into breast milk and whether or not it is safe to use when breastfeeding.  
Dutasteride Male Counseling: Dustasteride Counseling:  I discussed with the patient the risks of use of dutasteride including but not limited to decreased libido, decreased ejaculate volume, and gynecomastia. Women who can become pregnant should not handle medication.  All of the patient's questions and concerns were addressed.
Cephalexin Counseling: I counseled the patient regarding use of cephalexin as an antibiotic for prophylactic and/or therapeutic purposes. Cephalexin (commonly prescribed under brand name Keflex) is a cephalosporin antibiotic which is active against numerous classes of bacteria, including most skin bacteria. Side effects may include nausea, diarrhea, gastrointestinal upset, rash, hives, yeast infections, and in rare cases, hepatitis, kidney disease, seizures, fever, confusion, neurologic symptoms, and others. Patients with severe allergies to penicillin medications are cautioned that there is about a 10% incidence of cross-reactivity with cephalosporins. When possible, patients with penicillin allergies should use alternatives to cephalosporins for antibiotic therapy.
Infliximab Counseling:  I discussed with the patient the risks of infliximab including but not limited to myelosuppression, immunosuppression, autoimmune hepatitis, demyelinating diseases, lymphoma, and serious infections.  The patient understands that monitoring is required including a PPD at baseline and must alert us or the primary physician if symptoms of infection or other concerning signs are noted.
Glycopyrrolate Pregnancy And Lactation Text: This medication is Pregnancy Category B and is considered safe during pregnancy. It is unknown if it is excreted breast milk.
Tazorac Counseling:  Patient advised that medication is irritating and drying.  Patient may need to apply sparingly and wash off after an hour before eventually leaving it on overnight.  The patient verbalized understanding of the proper use and possible adverse effects of tazorac.  All of the patient's questions and concerns were addressed.
Xolair Counseling:  Patient informed of potential adverse effects including but not limited to fever, muscle aches, rash and allergic reactions.  The patient verbalized understanding of the proper use and possible adverse effects of Xolair.  All of the patient's questions and concerns were addressed.
Detail Level: Zone
Nsaids Counseling: NSAID Counseling: I discussed with the patient that NSAIDs should be taken with food. Prolonged use of NSAIDs can result in the development of stomach ulcers.  Patient advised to stop taking NSAIDs if abdominal pain occurs.  The patient verbalized understanding of the proper use and possible adverse effects of NSAIDs.  All of the patient's questions and concerns were addressed.
Albendazole Counseling:  I discussed with the patient the risks of albendazole including but not limited to cytopenia, kidney damage, nausea/vomiting and severe allergy.  The patient understands that this medication is being used in an off-label manner.
Terbinafine Counseling: Patient counseling regarding adverse effects of terbinafine including but not limited to headache, diarrhea, rash, upset stomach, liver function test abnormalities, itching, taste/smell disturbance, nausea, abdominal pain, and flatulence.  There is a rare possibility of liver failure that can occur when taking terbinafine.  The patient understands that a baseline LFT and kidney function test may be required. The patient verbalized understanding of the proper use and possible adverse effects of terbinafine.  All of the patient's questions and concerns were addressed.
Winlevi Counseling:  I discussed with the patient the risks of topical clascoterone including but not limited to erythema, scaling, itching, and stinging. Patient voiced their understanding.
Minocycline Counseling: Patient advised regarding possible photosensitivity and discoloration of the teeth, skin, lips, tongue and gums.  Patient instructed to avoid sunlight, if possible.  When exposed to sunlight, patients should wear protective clothing, sunglasses, and sunscreen.  The patient was instructed to call the office immediately if the following severe adverse effects occur:  hearing changes, easy bruising/bleeding, severe headache, or vision changes.  The patient verbalized understanding of the proper use and possible adverse effects of minocycline.  All of the patient's questions and concerns were addressed.
Drysol Counseling:  I discussed with the patient the risks of drysol/aluminum chloride including but not limited to skin rash, itching, irritation, burning.
Clofazimine Counseling:  I discussed with the patient the risks of clofazimine including but not limited to skin and eye pigmentation, liver damage, nausea/vomiting, gastrointestinal bleeding and allergy.
Dutasteride Pregnancy And Lactation Text: This medication is absolutely contraindicated in women, especially during pregnancy and breast feeding. Feminization of male fetuses is possible if taking while pregnant.
Propranolol Counseling:  I discussed with the patient the risks of propranolol including but not limited to low heart rate, low blood pressure, low blood sugar, restlessness and increased cold sensitivity. They should call the office if they experience any of these side effects.
Xolair Pregnancy And Lactation Text: This medication is Pregnancy Category B and is considered safe during pregnancy. This medication is excreted in breast milk.
Valtrex Pregnancy And Lactation Text: this medication is Pregnancy Category B and is considered safe during pregnancy. This medication is not directly found in breast milk but it's metabolite acyclovir is present.
Cephalexin Pregnancy And Lactation Text: This medication is Pregnancy Category B and considered safe during pregnancy.  It is also excreted in breast milk but can be used safely for shorter doses.
Xeljanz Counseling: I discussed with the patient the risks of Xeljanz therapy including increased risk of infection, liver issues, headache, diarrhea, or cold symptoms. Live vaccines should be avoided. They were instructed to call if they have any problems.
Cellcept Counseling:  I discussed with the patient the risks of mycophenolate mofetil including but not limited to infection/immunosuppression, GI upset, hypokalemia, hypercholesterolemia, bone marrow suppression, lymphoproliferative disorders, malignancy, GI ulceration/bleed/perforation, colitis, interstitial lung disease, kidney failure, progressive multifocal leukoencephalopathy, and birth defects.  The patient understands that monitoring is required including a baseline creatinine and regular CBC testing. In addition, patient must alert us immediately if symptoms of infection or other concerning signs are noted.
Hydroxychloroquine Counseling:  I discussed with the patient that a baseline ophthalmologic exam is needed at the start of therapy and every year thereafter while on therapy. A CBC may also be warranted for monitoring.  The side effects of this medication were discussed with the patient, including but not limited to agranulocytosis, aplastic anemia, seizures, rashes, retinopathy, and liver toxicity. Patient instructed to call the office should any adverse effect occur.  The patient verbalized understanding of the proper use and possible adverse effects of Plaquenil.  All the patient's questions and concerns were addressed.
Protopic Counseling: Patient may experience a mild burning sensation during topical application. Protopic is not approved in children less than 2 years of age. There have been case reports of hematologic and skin malignancies in patients using topical calcineurin inhibitors although causality is questionable.
Tazorac Pregnancy And Lactation Text: This medication is not safe during pregnancy. It is unknown if this medication is excreted in breast milk.
High Dose Vitamin A Pregnancy And Lactation Text: High dose vitamin A therapy is contraindicated during pregnancy and breast feeding.
Skyrizi Counseling: I discussed with the patient the risks of risankizumab-rzaa including but not limited to immunosuppression, and serious infections.  The patient understands that monitoring is required including a PPD at baseline and must alert us or the primary physician if symptoms of infection or other concerning signs are noted.

## 2023-12-18 ENCOUNTER — RX ONLY (OUTPATIENT)
Age: 73
Setting detail: RX ONLY
End: 2023-12-18

## 2023-12-18 RX ORDER — GUSELKUMAB 100 MG/ML
INJECTION SUBCUTANEOUS
Qty: 2 | Refills: 0 | Status: ERX

## 2023-12-18 RX ORDER — GUSELKUMAB 100 MG/ML
INJECTION SUBCUTANEOUS
Qty: 1 | Refills: 6 | Status: ERX | COMMUNITY
Start: 2023-12-18

## 2023-12-28 ENCOUNTER — RX ONLY (OUTPATIENT)
Age: 73
Setting detail: RX ONLY
End: 2023-12-28

## 2023-12-28 RX ORDER — SECUKINUMAB 300 MG/2ML
INJECTION SUBCUTANEOUS
Qty: 1 | Refills: 6 | Status: ERX | COMMUNITY
Start: 2023-12-28

## 2023-12-28 RX ORDER — SECUKINUMAB 300 MG/2ML
INJECTION SUBCUTANEOUS
Qty: 5 | Refills: 0 | Status: ERX

## 2024-02-27 ENCOUNTER — RX ONLY (OUTPATIENT)
Age: 74
Setting detail: RX ONLY
End: 2024-02-27

## 2024-02-27 RX ORDER — SECUKINUMAB 300 MG/2ML
INJECTION SUBCUTANEOUS
Qty: 1 | Refills: 6 | Status: ERX

## 2024-04-25 ENCOUNTER — NURSE ONLY (OUTPATIENT)
Dept: PRIMARY CARE CLINIC | Facility: CLINIC | Age: 74
End: 2024-04-25

## 2024-04-25 DIAGNOSIS — Z13.29 SCREENING FOR THYROID DISORDER: ICD-10-CM

## 2024-04-25 DIAGNOSIS — Z13.21 SCREENING FOR ENDOCRINE, NUTRITIONAL, METABOLIC AND IMMUNITY DISORDER: ICD-10-CM

## 2024-04-25 DIAGNOSIS — Z13.29 SCREENING FOR ENDOCRINE, METABOLIC AND IMMUNITY DISORDER: ICD-10-CM

## 2024-04-25 DIAGNOSIS — R79.9 ABNORMAL BLOOD CHEMISTRY: ICD-10-CM

## 2024-04-25 DIAGNOSIS — R53.82 CHRONIC FATIGUE: ICD-10-CM

## 2024-04-25 DIAGNOSIS — Z12.5 SCREENING FOR PROSTATE CANCER: ICD-10-CM

## 2024-04-25 DIAGNOSIS — Z13.21 ENCOUNTER FOR VITAMIN DEFICIENCY SCREENING: ICD-10-CM

## 2024-04-25 DIAGNOSIS — Z13.220 SCREENING FOR LIPID DISORDERS: ICD-10-CM

## 2024-04-25 DIAGNOSIS — Z13.228 SCREENING FOR ENDOCRINE, NUTRITIONAL, METABOLIC AND IMMUNITY DISORDER: ICD-10-CM

## 2024-04-25 DIAGNOSIS — Z13.0 SCREENING FOR DEFICIENCY ANEMIA: ICD-10-CM

## 2024-04-25 DIAGNOSIS — R53.83 OTHER FATIGUE: ICD-10-CM

## 2024-04-25 DIAGNOSIS — R79.89 OTHER SPECIFIED ABNORMAL FINDINGS OF BLOOD CHEMISTRY: ICD-10-CM

## 2024-04-25 DIAGNOSIS — Z13.228 SCREENING FOR ENDOCRINE, METABOLIC AND IMMUNITY DISORDER: ICD-10-CM

## 2024-04-25 DIAGNOSIS — Z13.0 SCREENING FOR ENDOCRINE, METABOLIC AND IMMUNITY DISORDER: ICD-10-CM

## 2024-04-25 DIAGNOSIS — Z13.0 SCREENING FOR ENDOCRINE, NUTRITIONAL, METABOLIC AND IMMUNITY DISORDER: ICD-10-CM

## 2024-04-25 DIAGNOSIS — Z13.228 SCREENING FOR METABOLIC DISORDER: ICD-10-CM

## 2024-04-25 DIAGNOSIS — Z13.1 SCREENING FOR DIABETES MELLITUS: Primary | ICD-10-CM

## 2024-04-25 DIAGNOSIS — Z13.29 SCREENING FOR ENDOCRINE, NUTRITIONAL, METABOLIC AND IMMUNITY DISORDER: ICD-10-CM

## 2024-04-25 DIAGNOSIS — E55.9 VITAMIN D DEFICIENCY: ICD-10-CM

## 2024-09-12 ENCOUNTER — TELEPHONE (OUTPATIENT)
Dept: PRIMARY CARE CLINIC | Facility: CLINIC | Age: 74
End: 2024-09-12

## 2025-08-26 ENCOUNTER — HOSPITAL ENCOUNTER (EMERGENCY)
Age: 75
Discharge: HOME | End: 2025-08-26
Attending: STUDENT IN AN ORGANIZED HEALTH CARE EDUCATION/TRAINING PROGRAM
Payer: MEDICARE

## 2025-08-26 VITALS
WEIGHT: 227 LBS | SYSTOLIC BLOOD PRESSURE: 176 MMHG | OXYGEN SATURATION: 100 % | HEART RATE: 72 BPM | RESPIRATION RATE: 16 BRPM | HEIGHT: 75 IN | DIASTOLIC BLOOD PRESSURE: 92 MMHG | BODY MASS INDEX: 28.23 KG/M2 | TEMPERATURE: 97.7 F

## 2025-08-26 DIAGNOSIS — G89.29 CHRONIC FOOT PAIN, UNSPECIFIED LATERALITY: Primary | ICD-10-CM

## 2025-08-26 DIAGNOSIS — M79.673 CHRONIC FOOT PAIN, UNSPECIFIED LATERALITY: Primary | ICD-10-CM

## 2025-08-26 PROCEDURE — 6370000000 HC RX 637 (ALT 250 FOR IP): Performed by: STUDENT IN AN ORGANIZED HEALTH CARE EDUCATION/TRAINING PROGRAM

## 2025-08-26 PROCEDURE — 99284 EMERGENCY DEPT VISIT MOD MDM: CPT

## 2025-08-26 PROCEDURE — 96372 THER/PROPH/DIAG INJ SC/IM: CPT

## 2025-08-26 PROCEDURE — 6360000002 HC RX W HCPCS: Performed by: STUDENT IN AN ORGANIZED HEALTH CARE EDUCATION/TRAINING PROGRAM

## 2025-08-26 RX ORDER — PREGABALIN 200 MG/1
200 CAPSULE ORAL 3 TIMES DAILY
Qty: 90 CAPSULE | Refills: 0 | Status: SHIPPED | OUTPATIENT
Start: 2025-08-26 | End: 2025-09-25

## 2025-08-26 RX ORDER — KETOROLAC TROMETHAMINE 15 MG/ML
15 INJECTION, SOLUTION INTRAMUSCULAR; INTRAVENOUS ONCE
Status: COMPLETED | OUTPATIENT
Start: 2025-08-26 | End: 2025-08-26

## 2025-08-26 RX ORDER — ACETAMINOPHEN 500 MG
1000 TABLET ORAL
Status: COMPLETED | OUTPATIENT
Start: 2025-08-26 | End: 2025-08-26

## 2025-08-26 RX ORDER — PREGABALIN 100 MG/1
200 CAPSULE ORAL
Status: COMPLETED | OUTPATIENT
Start: 2025-08-26 | End: 2025-08-26

## 2025-08-26 RX ADMIN — KETOROLAC TROMETHAMINE 15 MG: 15 INJECTION, SOLUTION INTRAMUSCULAR; INTRAVENOUS at 03:31

## 2025-08-26 RX ADMIN — ACETAMINOPHEN 1000 MG: 500 TABLET, FILM COATED ORAL at 03:31

## 2025-08-26 RX ADMIN — PREGABALIN 200 MG: 100 CAPSULE ORAL at 03:31

## 2025-08-26 ASSESSMENT — PAIN DESCRIPTION - DESCRIPTORS: DESCRIPTORS: ACHING

## 2025-08-26 ASSESSMENT — PAIN DESCRIPTION - LOCATION: LOCATION: LEG

## 2025-08-26 ASSESSMENT — PAIN SCALES - GENERAL: PAINLEVEL_OUTOF10: 10

## 2025-08-26 ASSESSMENT — LIFESTYLE VARIABLES
HOW MANY STANDARD DRINKS CONTAINING ALCOHOL DO YOU HAVE ON A TYPICAL DAY: PATIENT DOES NOT DRINK
HOW OFTEN DO YOU HAVE A DRINK CONTAINING ALCOHOL: NEVER

## 2025-08-26 ASSESSMENT — PAIN DESCRIPTION - ORIENTATION: ORIENTATION: RIGHT;LEFT

## 2025-08-27 ENCOUNTER — TELEPHONE (OUTPATIENT)
Age: 75
End: 2025-08-27

## (undated) DEVICE — ABSORBENT, WATERPROOF, BACTERIA PROOF FILM DRESSING: Brand: OPSITE POST OP 9.5X8.5CM CTN 20

## (undated) DEVICE — REM POLYHESIVE ADULT PATIENT RETURN ELECTRODE: Brand: VALLEYLAB

## (undated) DEVICE — RADIFOCUS OPTITORQUE ANGIOGRAPHIC CATHETER: Brand: OPTITORQUE

## (undated) DEVICE — 48" PROBE COVER W/GEL, ULTRASOUND, STERILE: Brand: SITE-RITE

## (undated) DEVICE — SUTURE MCRYL SZ 4-0 L27IN ABSRB UD L19MM PS-2 1/2 CIR PRIM Y426H

## (undated) DEVICE — GAUZE,SPONGE,8"X4",12PLY,XRAY,STRL,LF: Brand: MEDLINE

## (undated) DEVICE — TRAY CATH 16F URIN MTR LTX -- CONVERT TO ITEM 363111

## (undated) DEVICE — GLIDESHEATH SLENDER STAINLESS STEEL KIT: Brand: GLIDESHEATH SLENDER

## (undated) DEVICE — APPLICATOR BNDG 1MM ADH PREMIERPRO EXOFIN

## (undated) DEVICE — SUTURE PERMAHAND SZ 2-0 L18IN NONABSORBABLE BLK L26MM SH C012D

## (undated) DEVICE — SUT SLK 4-0 18IN TIE MP BLK --

## (undated) DEVICE — INTENDED TO BE USED TO OCCLUDE, RETRACT AND IDENTIFY ARTERIES, VEINS, TENDONS AND NERVES IN SURGICAL PROCEDURES: Brand: STERION®  VESSEL LOOP

## (undated) DEVICE — Device

## (undated) DEVICE — COVER,MAYO STAND,STERILE: Brand: MEDLINE

## (undated) DEVICE — SUTURE VCRL SZ 3-0 L27IN ABSRB UD L26MM SH 1/2 CIR J416H

## (undated) DEVICE — 2000CC GUARDIAN II: Brand: GUARDIAN

## (undated) DEVICE — STERILE HOOK LOCK LATEX FREE ELASTIC BANDAGE 6INX5YD: Brand: HOOK LOCK™

## (undated) DEVICE — DRAPE,U/SHT,SPLIT,FILM,60X84,STERILE: Brand: MEDLINE

## (undated) DEVICE — PAD THRM L UNIV NONSLIP HYDRGEL RECT PROVIDE OPTIMAL ENERGY

## (undated) DEVICE — LARGE (ORANGE) FOR GRAFTS UP TO 8 MM, 12" / 30.5 CM (1/PKG): Brand: SCANLAN® VASCULAR TUNNELER SHEATHS AND BULLET TIPS

## (undated) DEVICE — MAJOR VASCULAR: Brand: MEDLINE INDUSTRIES, INC.

## (undated) DEVICE — TAPE UMB 1/8X30IN MP COT WHT --

## (undated) DEVICE — CATHETER DIAG AD 5FR L125CM COR NYL MP AMPLATZ 2 W/ 2 SIDE

## (undated) DEVICE — CATHETER ANGIO 5FR L100CM GRY S STL NYL JR4 3 SEG BRAID L

## (undated) DEVICE — DRAPE IRRIG FLD WRM W44XL44IN W/ AORN STD PRTBL INTRATEMP

## (undated) DEVICE — SUTURE PERMAHAND SZ 2-0 L12X18IN NONABSORBABLE BLK SILK A185H

## (undated) DEVICE — DISH PTRI STRL --

## (undated) DEVICE — SUTURE PROL SZ 6-0 L24IN NONABSORBABLE BLU BV-1 L9.3MM 3/8 M8805

## (undated) DEVICE — TUBING, SUCTION, 1/4" X 10', STRAIGHT: Brand: MEDLINE

## (undated) DEVICE — SYR LR LCK 1ML GRAD NSAF 30ML --

## (undated) DEVICE — GUIDEWIRE 035IN 210CM PTFE COAT FIX COR J TIP 15MM FIRM BODY

## (undated) DEVICE — SPONGE LAP 18X18IN STRL -- 5/PK

## (undated) DEVICE — INTENDED FOR TISSUE SEPARATION, AND OTHER PROCEDURES THAT REQUIRE A SHARP SURGICAL BLADE TO PUNCTURE OR CUT.: Brand: BARD-PARKER SAFETY BLADES SIZE 15, STERILE

## (undated) DEVICE — BUTTON SWITCH PENCIL BLADE ELECTRODE, HOLSTER: Brand: EDGE

## (undated) DEVICE — DRAPE TWL SURG 16X26IN BLU ORB04] ALLCARE INC]

## (undated) DEVICE — GOWN,REINF,POLY,ECL,PP SLV,XL: Brand: MEDLINE

## (undated) DEVICE — GLOVE SURG SZ 7.5 L11.73IN FNGR THK9.8MIL STRW LTX POLYMER

## (undated) DEVICE — INTENDED FOR TISSUE SEPARATION, AND OTHER PROCEDURES THAT REQUIRE A SHARP SURGICAL BLADE TO PUNCTURE OR CUT.: Brand: BARD-PARKER SAFETY BLADES SIZE 11, STERILE

## (undated) DEVICE — GEL US 20GM NONIRRITATING OVERWRAPPED FILE PCH TRNSMIT

## (undated) DEVICE — CATHETER DIAG AD 5FR L110CM 145DEG COR GRY HYDRPHLC NYL

## (undated) DEVICE — NEEDLE HYPO 25GA L1.5IN BLU POLYPR HUB S STL REG BVL STR

## (undated) DEVICE — UNIVERSAL DRAPES: Brand: MEDLINE INDUSTRIES, INC.

## (undated) DEVICE — SOLUTION IV 1000ML 0.9% SOD CHL

## (undated) DEVICE — APPLIER CLP L9.375IN APER 2.1MM CLS L3.8MM 20 SM TI CLP

## (undated) DEVICE — SUTURE VCRL SZ 2-0 L36IN ABSRB UD L36MM CT-1 1/2 CIR J945H

## (undated) DEVICE — SUTURE PERMAHAND SZ 3-0 L18IN NONABSORBABLE BLK SILK BRAID A184H

## (undated) DEVICE — SYR 10ML LUER LOK 1/5ML GRAD --

## (undated) DEVICE — BANDAGE,GAUZE,BULKEE II,4.5"X4.1YD,STRL: Brand: MEDLINE

## (undated) DEVICE — 1840 FOAM BLOCK NEEDLE COUNTER: Brand: DEVON

## (undated) DEVICE — COVER,LIGHT HANDLE,FLX,2/PK: Brand: MEDLINE INDUSTRIES, INC.

## (undated) DEVICE — STOCKINETTE,IMPERVIOUS,12X48,STERILE: Brand: MEDLINE

## (undated) DEVICE — (D)PREP SKN CHLRAPRP APPL 26ML -- CONVERT TO ITEM 371833

## (undated) DEVICE — BASIC SINGLE BASIN-LF: Brand: MEDLINE INDUSTRIES, INC.

## (undated) DEVICE — BLADE ASSEMB CLP HAIR FINE --

## (undated) DEVICE — BLADE OPHTH MINIATURE CORNEAL STR DEL SHRP BEAV

## (undated) DEVICE — SUTURE PROL SZ 5-0 L24IN NONABSORBABLE BLU L13MM C-1 3/8 M8725

## (undated) DEVICE — GOWN,REINFORCED,POLY,AURORA,XXLARGE,STR: Brand: MEDLINE

## (undated) DEVICE — 3M™ IOBAN™ 2 ANTIMICROBIAL INCISE DRAPE 6650EZ: Brand: IOBAN™ 2

## (undated) DEVICE — SKIN MARKER,REGULAR TIP WITH RULER AND LABELS: Brand: DEVON

## (undated) DEVICE — AGENT HEMSTAT W4XL4IN OXIDIZED REGENERATED CELOS ABSRB SFT

## (undated) DEVICE — X-RAY SPONGES,12 PLY: Brand: DERMACEA

## (undated) DEVICE — APPLIER LIG CLP M L11IN TI STR RNG HNDL FOR 20 CLP DISP

## (undated) DEVICE — BNDG,ELSTC,MATRIX,STRL,4"X5YD,LF,HOOK&LP: Brand: MEDLINE

## (undated) DEVICE — SOL IRR SOD CL 0.9% 1000ML BTL --

## (undated) DEVICE — BAND RADIAL COMPR ARTERY 24CM -- REG BX/10